# Patient Record
Sex: FEMALE | Race: WHITE | Employment: OTHER | URBAN - METROPOLITAN AREA
[De-identification: names, ages, dates, MRNs, and addresses within clinical notes are randomized per-mention and may not be internally consistent; named-entity substitution may affect disease eponyms.]

---

## 2019-03-12 PROBLEM — I35.8 AORTIC VALVE SCLEROSIS: Status: ACTIVE | Noted: 2019-03-12

## 2019-06-25 PROBLEM — I25.10 CAD (CORONARY ARTERY DISEASE): Status: ACTIVE | Noted: 2019-06-25

## 2019-06-25 PROBLEM — I10 HYPERTENSION: Status: ACTIVE | Noted: 2019-06-25

## 2020-01-23 ENCOUNTER — HOSPITAL ENCOUNTER (OUTPATIENT)
Dept: LAB | Age: 85
Discharge: HOME | End: 2020-01-23
Payer: MEDICARE

## 2020-01-23 DIAGNOSIS — I10 ESSENTIAL (PRIMARY) HYPERTENSION: ICD-10-CM

## 2020-01-23 DIAGNOSIS — D64.9 ANEMIA, UNSPECIFIED TYPE: ICD-10-CM

## 2020-01-23 DIAGNOSIS — E55.9 VITAMIN D DEFICIENCY, UNSPECIFIED: ICD-10-CM

## 2020-01-23 DIAGNOSIS — R68.89 OTHER GENERAL SYMPTOMS AND SIGNS: ICD-10-CM

## 2020-01-23 PROBLEM — D63.1 ANEMIA DUE TO CHRONIC KIDNEY DISEASE TREATED WITH ERYTHROPOIETIN: Status: ACTIVE | Noted: 2020-01-23

## 2020-01-23 PROBLEM — N18.9 ANEMIA DUE TO CHRONIC KIDNEY DISEASE TREATED WITH ERYTHROPOIETIN: Status: ACTIVE | Noted: 2020-01-23

## 2020-01-23 LAB
ALBUMIN SERPL-MCNC: 3.7 G/DL (ref 3.2–4.6)
ALBUMIN/GLOB SERPL: 0.9 {RATIO} (ref 1.2–3.5)
ALP SERPL-CCNC: 84 U/L (ref 50–136)
ALT SERPL-CCNC: 17 U/L (ref 12–65)
ANION GAP SERPL CALC-SCNC: 8 MMOL/L (ref 7–16)
AST SERPL-CCNC: 16 U/L (ref 15–37)
BASOPHILS # BLD: 0 K/UL (ref 0–0.2)
BASOPHILS NFR BLD: 0 % (ref 0–2)
BILIRUB SERPL-MCNC: 0.3 MG/DL (ref 0.2–1.1)
BUN SERPL-MCNC: 33 MG/DL (ref 8–23)
CALCIUM SERPL-MCNC: 9.1 MG/DL (ref 8.3–10.4)
CHLORIDE SERPL-SCNC: 106 MMOL/L (ref 98–107)
CO2 SERPL-SCNC: 23 MMOL/L (ref 21–32)
CREAT SERPL-MCNC: 1.33 MG/DL (ref 0.6–1)
CRP SERPL HS-MCNC: 1.9 MG/L
DAT POLY-SP REAG RBC QL: NORMAL
DIFFERENTIAL METHOD BLD: ABNORMAL
EOSINOPHIL # BLD: 0.2 K/UL (ref 0–0.8)
EOSINOPHIL NFR BLD: 3 % (ref 0.5–7.8)
ERYTHROCYTE [DISTWIDTH] IN BLOOD BY AUTOMATED COUNT: 12.9 % (ref 11.9–14.6)
ERYTHROCYTE [SEDIMENTATION RATE] IN BLOOD: 46 MM/HR (ref 0–30)
FERRITIN SERPL-MCNC: 241 NG/ML (ref 8–388)
GLOBULIN SER CALC-MCNC: 4 G/DL (ref 2.3–3.5)
GLUCOSE SERPL-MCNC: 144 MG/DL (ref 65–100)
HCT VFR BLD AUTO: 32.9 % (ref 35.8–46.3)
HGB BLD-MCNC: 10.5 G/DL (ref 11.7–15.4)
HGB RETIC QN AUTO: 35 PG (ref 29–35)
IMM GRANULOCYTES # BLD AUTO: 0 K/UL (ref 0–0.5)
IMM GRANULOCYTES NFR BLD AUTO: 0 % (ref 0–5)
IMM RETICS NFR: 6.4 % (ref 3–15.9)
LYMPHOCYTES # BLD: 3 K/UL (ref 0.5–4.6)
LYMPHOCYTES NFR BLD: 38 % (ref 13–44)
MCH RBC QN AUTO: 31.3 PG (ref 26.1–32.9)
MCHC RBC AUTO-ENTMCNC: 31.9 G/DL (ref 31.4–35)
MCV RBC AUTO: 97.9 FL (ref 79.6–97.8)
MONOCYTES # BLD: 0.7 K/UL (ref 0.1–1.3)
MONOCYTES NFR BLD: 8 % (ref 4–12)
NEUTS SEG # BLD: 4 K/UL (ref 1.7–8.2)
NEUTS SEG NFR BLD: 51 % (ref 43–78)
NRBC # BLD: 0 K/UL (ref 0–0.2)
PLATELET # BLD AUTO: 181 K/UL (ref 150–450)
PMV BLD AUTO: 10 FL (ref 9.4–12.3)
POTASSIUM SERPL-SCNC: 4.5 MMOL/L (ref 3.5–5.1)
PROT SERPL-MCNC: 7.7 G/DL (ref 6.3–8.2)
RBC # BLD AUTO: 3.36 M/UL (ref 4.05–5.25)
RETICS # AUTO: 0.04 M/UL (ref 0.03–0.1)
RETICS/RBC NFR AUTO: 1.1 % (ref 0.3–2)
SODIUM SERPL-SCNC: 137 MMOL/L (ref 136–145)
VIT B12 SERPL-MCNC: 669 PG/ML (ref 193–986)
WBC # BLD AUTO: 7.9 K/UL (ref 4.3–11.1)

## 2020-01-23 PROCEDURE — 84165 PROTEIN E-PHORESIS SERUM: CPT

## 2020-01-23 PROCEDURE — 80053 COMPREHEN METABOLIC PANEL: CPT

## 2020-01-23 PROCEDURE — 86141 C-REACTIVE PROTEIN HS: CPT

## 2020-01-23 PROCEDURE — 86334 IMMUNOFIX E-PHORESIS SERUM: CPT

## 2020-01-23 PROCEDURE — 82607 VITAMIN B-12: CPT

## 2020-01-23 PROCEDURE — 82668 ASSAY OF ERYTHROPOIETIN: CPT

## 2020-01-23 PROCEDURE — 85652 RBC SED RATE AUTOMATED: CPT

## 2020-01-23 PROCEDURE — 36415 COLL VENOUS BLD VENIPUNCTURE: CPT

## 2020-01-23 PROCEDURE — 85025 COMPLETE CBC W/AUTO DIFF WBC: CPT

## 2020-01-23 PROCEDURE — 86880 COOMBS TEST DIRECT: CPT

## 2020-01-23 PROCEDURE — 82306 VITAMIN D 25 HYDROXY: CPT

## 2020-01-23 PROCEDURE — 82728 ASSAY OF FERRITIN: CPT

## 2020-01-23 PROCEDURE — 85046 RETICYTE/HGB CONCENTRATE: CPT

## 2020-01-24 LAB
25(OH)D3+25(OH)D2 SERPL-MCNC: 61.6 NG/ML (ref 30–100)
ALBUMIN SERPL ELPH-MCNC: 4.11 G/DL (ref 3.2–5.6)
ALBUMIN/GLOB SERPL: 1.2 {RATIO}
ALPHA1 GLOB SERPL ELPH-MCNC: 0.3 G/DL (ref 0.1–0.4)
ALPHA2 GLOB SERPL ELPH-MCNC: 1.11 G/DL (ref 0.4–1.2)
B-GLOBULIN SERPL QL ELPH: 1.06 G/DL (ref 0.6–1.3)
EPO SERPL-ACNC: 14.1 MIU/ML (ref 2.6–18.5)
GAMMA GLOB MFR SERPL ELPH: 1.12 G/DL (ref 0.5–1.6)
IGA SERPL-MCNC: 240 MG/DL (ref 85–499)
IGG SERPL-MCNC: 981 MG/DL (ref 610–1616)
IGM SERPL-MCNC: 157 MG/DL (ref 35–242)
M PROTEIN SERPL ELPH-MCNC: NORMAL G/DL
PROT PATTERN SERPL ELPH-IMP: NORMAL
PROT PATTERN SPEC IFE-IMP: NORMAL
PROT SERPL-MCNC: 7.7 G/DL (ref 6.3–8.2)

## 2020-02-12 ENCOUNTER — HOSPITAL ENCOUNTER (OUTPATIENT)
Dept: LAB | Age: 85
Discharge: HOME | End: 2020-02-12
Payer: MEDICARE

## 2020-02-12 ENCOUNTER — HOSPITAL ENCOUNTER (OUTPATIENT)
Dept: INFUSION THERAPY | Age: 85
Discharge: HOME | End: 2020-02-12

## 2020-02-12 DIAGNOSIS — D64.9 ANEMIA, UNSPECIFIED TYPE: ICD-10-CM

## 2020-02-12 LAB
ALBUMIN SERPL-MCNC: 3.7 G/DL (ref 3.2–4.6)
ALBUMIN/GLOB SERPL: 0.9 {RATIO} (ref 1.2–3.5)
ALP SERPL-CCNC: 70 U/L (ref 50–136)
ALT SERPL-CCNC: 14 U/L (ref 12–65)
ANION GAP SERPL CALC-SCNC: 5 MMOL/L (ref 7–16)
AST SERPL-CCNC: 15 U/L (ref 15–37)
BASOPHILS # BLD: 0 K/UL (ref 0–0.2)
BASOPHILS NFR BLD: 0 % (ref 0–2)
BILIRUB SERPL-MCNC: 0.3 MG/DL (ref 0.2–1.1)
BUN SERPL-MCNC: 27 MG/DL (ref 8–23)
CALCIUM SERPL-MCNC: 9.2 MG/DL (ref 8.3–10.4)
CHLORIDE SERPL-SCNC: 100 MMOL/L (ref 98–107)
CO2 SERPL-SCNC: 27 MMOL/L (ref 21–32)
CREAT SERPL-MCNC: 1.24 MG/DL (ref 0.6–1)
DIFFERENTIAL METHOD BLD: ABNORMAL
EOSINOPHIL # BLD: 0.1 K/UL (ref 0–0.8)
EOSINOPHIL NFR BLD: 1 % (ref 0.5–7.8)
ERYTHROCYTE [DISTWIDTH] IN BLOOD BY AUTOMATED COUNT: 12.5 % (ref 11.9–14.6)
GLOBULIN SER CALC-MCNC: 3.9 G/DL (ref 2.3–3.5)
GLUCOSE SERPL-MCNC: 111 MG/DL (ref 65–100)
HCT VFR BLD AUTO: 32.8 % (ref 35.8–46.3)
HGB BLD-MCNC: 10.8 G/DL (ref 11.7–15.4)
IMM GRANULOCYTES # BLD AUTO: 0 K/UL (ref 0–0.5)
IMM GRANULOCYTES NFR BLD AUTO: 0 % (ref 0–5)
LYMPHOCYTES # BLD: 2.5 K/UL (ref 0.5–4.6)
LYMPHOCYTES NFR BLD: 33 % (ref 13–44)
MCH RBC QN AUTO: 31.5 PG (ref 26.1–32.9)
MCHC RBC AUTO-ENTMCNC: 32.9 G/DL (ref 31.4–35)
MCV RBC AUTO: 95.6 FL (ref 79.6–97.8)
MONOCYTES # BLD: 0.7 K/UL (ref 0.1–1.3)
MONOCYTES NFR BLD: 9 % (ref 4–12)
NEUTS SEG # BLD: 4.3 K/UL (ref 1.7–8.2)
NEUTS SEG NFR BLD: 56 % (ref 43–78)
NRBC # BLD: 0 K/UL (ref 0–0.2)
PLATELET # BLD AUTO: 168 K/UL (ref 150–450)
PMV BLD AUTO: 9.7 FL (ref 9.4–12.3)
POTASSIUM SERPL-SCNC: 5 MMOL/L (ref 3.5–5.1)
PROT SERPL-MCNC: 7.6 G/DL (ref 6.3–8.2)
RBC # BLD AUTO: 3.43 M/UL (ref 4.05–5.25)
SODIUM SERPL-SCNC: 132 MMOL/L (ref 136–145)
WBC # BLD AUTO: 7.7 K/UL (ref 4.3–11.1)

## 2020-02-12 PROCEDURE — 80053 COMPREHEN METABOLIC PANEL: CPT

## 2020-02-12 PROCEDURE — 36415 COLL VENOUS BLD VENIPUNCTURE: CPT

## 2020-02-12 PROCEDURE — 85025 COMPLETE CBC W/AUTO DIFF WBC: CPT

## 2020-08-12 ENCOUNTER — HOSPITAL ENCOUNTER (OUTPATIENT)
Dept: INFUSION THERAPY | Age: 85
Discharge: HOME | End: 2020-08-12

## 2020-08-12 ENCOUNTER — HOSPITAL ENCOUNTER (OUTPATIENT)
Dept: LAB | Age: 85
Discharge: HOME | End: 2020-08-12
Payer: MEDICARE

## 2020-08-12 DIAGNOSIS — D63.1 ANEMIA DUE TO CHRONIC KIDNEY DISEASE TREATED WITH ERYTHROPOIETIN: ICD-10-CM

## 2020-08-12 DIAGNOSIS — N18.9 ANEMIA DUE TO CHRONIC KIDNEY DISEASE TREATED WITH ERYTHROPOIETIN: ICD-10-CM

## 2020-08-12 LAB
ALBUMIN SERPL-MCNC: 4.1 G/DL (ref 3.2–4.6)
ALBUMIN/GLOB SERPL: 1.1 {RATIO} (ref 1.2–3.5)
ALP SERPL-CCNC: 88 U/L (ref 50–136)
ALT SERPL-CCNC: 20 U/L (ref 12–65)
ANION GAP SERPL CALC-SCNC: 7 MMOL/L (ref 7–16)
AST SERPL-CCNC: 21 U/L (ref 15–37)
BASOPHILS # BLD: 0.1 K/UL (ref 0–0.2)
BASOPHILS NFR BLD: 1 % (ref 0–2)
BILIRUB SERPL-MCNC: 0.3 MG/DL (ref 0.2–1.1)
BUN SERPL-MCNC: 21 MG/DL (ref 8–23)
CALCIUM SERPL-MCNC: 9.1 MG/DL (ref 8.3–10.4)
CHLORIDE SERPL-SCNC: 105 MMOL/L (ref 98–107)
CO2 SERPL-SCNC: 26 MMOL/L (ref 21–32)
CREAT SERPL-MCNC: 1.2 MG/DL (ref 0.6–1)
DIFFERENTIAL METHOD BLD: ABNORMAL
EOSINOPHIL # BLD: 0.1 K/UL (ref 0–0.8)
EOSINOPHIL NFR BLD: 1 % (ref 0.5–7.8)
ERYTHROCYTE [DISTWIDTH] IN BLOOD BY AUTOMATED COUNT: 12.9 % (ref 11.9–14.6)
GLOBULIN SER CALC-MCNC: 3.8 G/DL (ref 2.3–3.5)
GLUCOSE SERPL-MCNC: 100 MG/DL (ref 65–100)
HCT VFR BLD AUTO: 35.2 % (ref 35.8–46.3)
HGB BLD-MCNC: 11.6 G/DL (ref 11.7–15.4)
IMM GRANULOCYTES # BLD AUTO: 0 K/UL (ref 0–0.5)
IMM GRANULOCYTES NFR BLD AUTO: 0 % (ref 0–5)
LYMPHOCYTES # BLD: 3.5 K/UL (ref 0.5–4.6)
LYMPHOCYTES NFR BLD: 36 % (ref 13–44)
MCH RBC QN AUTO: 30.7 PG (ref 26.1–32.9)
MCHC RBC AUTO-ENTMCNC: 33 G/DL (ref 31.4–35)
MCV RBC AUTO: 93.1 FL (ref 79.6–97.8)
MONOCYTES # BLD: 0.8 K/UL (ref 0.1–1.3)
MONOCYTES NFR BLD: 9 % (ref 4–12)
NEUTS SEG # BLD: 5.1 K/UL (ref 1.7–8.2)
NEUTS SEG NFR BLD: 53 % (ref 43–78)
NRBC # BLD: 0 K/UL (ref 0–0.2)
PLATELET # BLD AUTO: 155 K/UL (ref 150–450)
PMV BLD AUTO: 9.6 FL (ref 9.4–12.3)
POTASSIUM SERPL-SCNC: 4.3 MMOL/L (ref 3.5–5.1)
PROT SERPL-MCNC: 7.9 G/DL (ref 6.3–8.2)
RBC # BLD AUTO: 3.78 M/UL (ref 4.05–5.25)
SODIUM SERPL-SCNC: 138 MMOL/L (ref 136–145)
WBC # BLD AUTO: 9.6 K/UL (ref 4.3–11.1)

## 2020-08-12 PROCEDURE — 85025 COMPLETE CBC W/AUTO DIFF WBC: CPT

## 2020-08-12 PROCEDURE — 36415 COLL VENOUS BLD VENIPUNCTURE: CPT

## 2020-08-12 PROCEDURE — 80053 COMPREHEN METABOLIC PANEL: CPT

## 2021-03-10 ENCOUNTER — HOSPITAL ENCOUNTER (OUTPATIENT)
Dept: INFUSION THERAPY | Age: 86
Discharge: HOME | End: 2021-03-10

## 2021-03-10 ENCOUNTER — HOSPITAL ENCOUNTER (OUTPATIENT)
Dept: LAB | Age: 86
Discharge: HOME | End: 2021-03-10
Payer: MEDICARE

## 2021-03-10 DIAGNOSIS — D63.1 ANEMIA DUE TO CHRONIC KIDNEY DISEASE TREATED WITH ERYTHROPOIETIN: ICD-10-CM

## 2021-03-10 DIAGNOSIS — N18.9 ANEMIA DUE TO CHRONIC KIDNEY DISEASE TREATED WITH ERYTHROPOIETIN: ICD-10-CM

## 2021-03-10 LAB
ALBUMIN SERPL-MCNC: 3.8 G/DL (ref 3.2–4.6)
ALBUMIN/GLOB SERPL: 1 {RATIO} (ref 1.2–3.5)
ALP SERPL-CCNC: 114 U/L (ref 50–136)
ALT SERPL-CCNC: 21 U/L (ref 12–65)
ANION GAP SERPL CALC-SCNC: 2 MMOL/L (ref 7–16)
AST SERPL-CCNC: 19 U/L (ref 15–37)
BASOPHILS # BLD: 0 K/UL (ref 0–0.2)
BASOPHILS NFR BLD: 0 % (ref 0–2)
BILIRUB SERPL-MCNC: 0.3 MG/DL (ref 0.2–1.1)
BUN SERPL-MCNC: 23 MG/DL (ref 8–23)
CALCIUM SERPL-MCNC: 9.1 MG/DL (ref 8.3–10.4)
CHLORIDE SERPL-SCNC: 102 MMOL/L (ref 98–107)
CO2 SERPL-SCNC: 32 MMOL/L (ref 21–32)
CREAT SERPL-MCNC: 1.1 MG/DL (ref 0.6–1)
DIFFERENTIAL METHOD BLD: ABNORMAL
EOSINOPHIL # BLD: 0.1 K/UL (ref 0–0.8)
EOSINOPHIL NFR BLD: 2 % (ref 0.5–7.8)
ERYTHROCYTE [DISTWIDTH] IN BLOOD BY AUTOMATED COUNT: 13.5 % (ref 11.9–14.6)
GLOBULIN SER CALC-MCNC: 4 G/DL (ref 2.3–3.5)
GLUCOSE SERPL-MCNC: 91 MG/DL (ref 65–100)
HCT VFR BLD AUTO: 33.9 % (ref 35.8–46.3)
HGB BLD-MCNC: 10.7 G/DL (ref 11.7–15.4)
IMM GRANULOCYTES # BLD AUTO: 0 K/UL (ref 0–0.5)
IMM GRANULOCYTES NFR BLD AUTO: 0 % (ref 0–5)
LYMPHOCYTES # BLD: 2.4 K/UL (ref 0.5–4.6)
LYMPHOCYTES NFR BLD: 33 % (ref 13–44)
MCH RBC QN AUTO: 30.3 PG (ref 26.1–32.9)
MCHC RBC AUTO-ENTMCNC: 31.6 G/DL (ref 31.4–35)
MCV RBC AUTO: 96 FL (ref 79.6–97.8)
MONOCYTES # BLD: 0.9 K/UL (ref 0.1–1.3)
MONOCYTES NFR BLD: 12 % (ref 4–12)
NEUTS SEG # BLD: 3.7 K/UL (ref 1.7–8.2)
NEUTS SEG NFR BLD: 53 % (ref 43–78)
NRBC # BLD: 0 K/UL (ref 0–0.2)
PLATELET # BLD AUTO: 152 K/UL (ref 150–450)
PMV BLD AUTO: 9.5 FL (ref 9.4–12.3)
POTASSIUM SERPL-SCNC: 4.5 MMOL/L (ref 3.5–5.1)
PROT SERPL-MCNC: 7.8 G/DL (ref 6.3–8.2)
RBC # BLD AUTO: 3.53 M/UL (ref 4.05–5.25)
SODIUM SERPL-SCNC: 136 MMOL/L (ref 136–145)
WBC # BLD AUTO: 7.1 K/UL (ref 4.3–11.1)

## 2021-03-10 PROCEDURE — 85025 COMPLETE CBC W/AUTO DIFF WBC: CPT

## 2021-03-10 PROCEDURE — 36415 COLL VENOUS BLD VENIPUNCTURE: CPT

## 2021-03-10 PROCEDURE — 80053 COMPREHEN METABOLIC PANEL: CPT

## 2022-03-18 PROBLEM — I35.8 AORTIC VALVE SCLEROSIS: Status: ACTIVE | Noted: 2019-03-12

## 2022-03-19 PROBLEM — N18.9 ANEMIA DUE TO CHRONIC KIDNEY DISEASE TREATED WITH ERYTHROPOIETIN: Status: ACTIVE | Noted: 2020-01-23

## 2022-03-19 PROBLEM — I10 HYPERTENSION: Status: ACTIVE | Noted: 2019-06-25

## 2022-03-19 PROBLEM — D63.1 ANEMIA DUE TO CHRONIC KIDNEY DISEASE TREATED WITH ERYTHROPOIETIN: Status: ACTIVE | Noted: 2020-01-23

## 2022-03-19 PROBLEM — I25.10 CAD (CORONARY ARTERY DISEASE): Status: ACTIVE | Noted: 2019-06-25

## 2022-04-14 ENCOUNTER — HOSPITAL ENCOUNTER (OUTPATIENT)
Dept: PHYSICAL THERAPY | Age: 87
Discharge: HOME | End: 2022-04-14
Payer: MEDICARE

## 2022-04-14 NOTE — PROGRESS NOTES
Wily Walker  : 1933  [place]  Secondary: Orlando Jolley 75 at [place] [id number] [facility], [address] [place] [address],[address], [facility] U. 91.  [phone]   [phone]          OUTPATIENT DAILY NOTE    NAME/AGE/GENDER: Wily Walker is a 80 y.o. female. DATE: 2022    Patient cancelled (less than 24 hours ago) her initial evaluation appointment today due to illness. Will plan to follow up on next scheduled visit.     Lito Jeffrey, PT    Future Appointments   Date Time Provider Maki Topete   2022  9:30 AM Cheryl Hurt PT [facility]   4/15/2022 12:30 PM [address] [place] [address] OUTREACH INSURANCE [address]   4/15/2022  1:00 PM Krystin Eugene MD [facility]   2022  1:30 PM Fahad Webber MD [address] [place] [address]

## 2022-04-15 ENCOUNTER — HOSPITAL ENCOUNTER (OUTPATIENT)
Dept: LAB | Age: 87
Discharge: HOME | End: 2022-04-15
Payer: MEDICARE

## 2022-04-15 DIAGNOSIS — N18.9 ANEMIA DUE TO CHRONIC KIDNEY DISEASE, UNSPECIFIED CKD STAGE: ICD-10-CM

## 2022-04-15 DIAGNOSIS — D63.1 ANEMIA DUE TO CHRONIC KIDNEY DISEASE, UNSPECIFIED CKD STAGE: ICD-10-CM

## 2022-04-15 LAB
ALBUMIN SERPL-MCNC: 3.8 G/DL (ref 3.2–4.6)
ALBUMIN/GLOB SERPL: 1 {RATIO} (ref 1.2–3.5)
ALP SERPL-CCNC: 81 U/L (ref 50–136)
ALT SERPL-CCNC: 17 U/L (ref 12–65)
ANION GAP SERPL CALC-SCNC: 4 MMOL/L (ref 7–16)
AST SERPL-CCNC: 18 U/L (ref 15–37)
BASOPHILS # BLD: 0 K/UL (ref 0–0.2)
BASOPHILS NFR BLD: 1 % (ref 0–2)
BILIRUB SERPL-MCNC: 0.3 MG/DL (ref 0.2–1.1)
BUN SERPL-MCNC: 22 MG/DL (ref 8–23)
CALCIUM SERPL-MCNC: 9.1 MG/DL (ref 8.3–10.4)
CHLORIDE SERPL-SCNC: 104 MMOL/L (ref 98–107)
CO2 SERPL-SCNC: 30 MMOL/L (ref 21–32)
CREAT SERPL-MCNC: 1.1 MG/DL (ref 0.6–1)
DIFFERENTIAL METHOD BLD: ABNORMAL
EOSINOPHIL # BLD: 0.1 K/UL (ref 0–0.8)
EOSINOPHIL NFR BLD: 2 % (ref 0.5–7.8)
ERYTHROCYTE [DISTWIDTH] IN BLOOD BY AUTOMATED COUNT: 13.1 % (ref 11.9–14.6)
GLOBULIN SER CALC-MCNC: 3.7 G/DL (ref 2.3–3.5)
GLUCOSE SERPL-MCNC: 120 MG/DL (ref 65–100)
HCT VFR BLD AUTO: 35.9 % (ref 35.8–46.3)
HGB BLD-MCNC: 11.3 G/DL (ref 11.7–15.4)
IMM GRANULOCYTES # BLD AUTO: 0 K/UL (ref 0–0.5)
IMM GRANULOCYTES NFR BLD AUTO: 0 % (ref 0–5)
LYMPHOCYTES # BLD: 2.8 K/UL (ref 0.5–4.6)
LYMPHOCYTES NFR BLD: 34 % (ref 13–44)
MCH RBC QN AUTO: 29.8 PG (ref 26.1–32.9)
MCHC RBC AUTO-ENTMCNC: 31.5 G/DL (ref 31.4–35)
MCV RBC AUTO: 94.7 FL (ref 79.6–97.8)
MONOCYTES # BLD: 0.8 K/UL (ref 0.1–1.3)
MONOCYTES NFR BLD: 10 % (ref 4–12)
NEUTS SEG # BLD: 4.4 K/UL (ref 1.7–8.2)
NEUTS SEG NFR BLD: 54 % (ref 43–78)
NRBC # BLD: 0 K/UL (ref 0–0.2)
PLATELET # BLD AUTO: 160 K/UL (ref 150–450)
PMV BLD AUTO: 9.7 FL (ref 9.4–12.3)
POTASSIUM SERPL-SCNC: 3.8 MMOL/L (ref 3.5–5.1)
PROT SERPL-MCNC: 7.5 G/DL (ref 6.3–8.2)
RBC # BLD AUTO: 3.79 M/UL (ref 4.05–5.2)
SODIUM SERPL-SCNC: 138 MMOL/L (ref 136–145)
WBC # BLD AUTO: 8.1 K/UL (ref 4.3–11.1)

## 2022-04-15 PROCEDURE — 36415 COLL VENOUS BLD VENIPUNCTURE: CPT

## 2022-04-15 PROCEDURE — 85025 COMPLETE CBC W/AUTO DIFF WBC: CPT

## 2022-04-15 PROCEDURE — 80053 COMPREHEN METABOLIC PANEL: CPT

## 2022-04-25 ENCOUNTER — HOSPITAL ENCOUNTER (OUTPATIENT)
Dept: PHYSICAL THERAPY | Age: 87
Discharge: HOME | End: 2022-04-25
Payer: MEDICARE

## 2022-04-25 PROCEDURE — 97110 THERAPEUTIC EXERCISES: CPT

## 2022-04-25 PROCEDURE — 97162 PT EVAL MOD COMPLEX 30 MIN: CPT

## 2022-04-25 NOTE — PROGRESS NOTES
Ellie Martines  : 1933  [place]  Secondary: Orlando Lazo at [facility]  [facility] [facility], [address] [place] [address],[address], [facility] U. 91.  [phone]   [phone]     OUTPATIENT PHYSICAL THERAPY: Daily Treatment Note 2022  Visit Count:  1    ICD-10: Treatment Diagnosis: Difficulty in walking, not elsewhere classified (R26.2)  Precautions/Allergies:   Pcn [penicillins]   TREATMENT PLAN:  Effective Dates: 2022 TO 2022 (90 days). Frequency/Duration: 2 times a week for 90 Day(s)    Pre-treatment Symptoms/Complaints:  Imbalance, weakness, and difficulty walking  Pain: Initial: Pain Intensity 1: 0  Post Session:  0/10   Medications Last Reviewed:  2022  Updated Objective Findings:  See evaluation note from today  TREATMENT:     THERAPEUTIC EXERCISE: (25 minutes):  Exercises per grid below to improve mobility and strength. Required minimal verbal cues to promote proper body alignment. Progressed repetitions as indicated. Date:  2022 Date:   Date:     Activity/Exercise Parameters Parameters Parameters   Nustep  Level 2 x 10 minutes     Standing hip flexion 10 reps bilateral     Standing hip abduction 10 reps bilateral     Standing hamstring curls 10 reps bilateral     Seated LAQS 10 reps bilateral                     [employer] Portal  Treatment/Session Summary:    · Response to Treatment:  tolerated well. · Communication/Consultation:  None today  · Equipment provided today:  None today  · Recommendations/Intent for next treatment session: Next visit will focus on balance, strengthening, and gait.     Total Treatment Billable Duration:  25 minutes+ evaluation  PT Patient Time In/Time Out  Time In: 1515  Time Out: [address], PT    Future Appointments   Date Time Provider Maki Topete   2022  1:30 PM [facility]   2023 12:30 PM [facility] OUTREACH INSURANCE [facility]IG [facility]   4/14/2023  1:00 PM Jameson Dsouza MD [facility]     Visit # Therapist initials Date Arrived NS/ Cx < 24 hr >24 hr Cx Visit Comments   1 PV 4/25/2022 X   Initial Assessment and Treatment                                                                                                                                                              Abbreviations: NS = No Show; CX = cancelled

## 2022-04-25 NOTE — THERAPY EVALUATION
Janett Montoya  : 1933  [place]  [facility]: [facility], [address] [place] [address],[address], [address] [facility]  [phone]   [phone]        OUTPATIENT PHYSICAL THERAPY:Initial Assessment 2022   ICD-10: Treatment Diagnosis: Difficulty in walking, not elsewhere classified (R26.2)  Precautions/Allergies:   Pcn [penicillins]   TREATMENT PLAN:  Effective Dates: 2022 TO 2022 (90 days). Frequency/Duration: 2 times a week for 90 Day(s) MEDICAL/REFERRING DIAGNOSIS:  Weakness [R53.1]   DATE OF ONSET: Over the past year   REFERRING PHYSICIAN: Shaheen Grace MD Orders: Evaluate and treat   Return MD Appointment: unknown      INITIAL ASSESSMENT:  Ms. Lorne Sterling presents with increased weakness, imbalance, and difficulty walking. Patient is at higher fall risk based on history of falls and score received on Casey Balance Scale. Patient would benefit from skilled physical therapy to address problems and goals. Thank you for this referral.      PROBLEM LIST (Impacting functional limitations):  1. Decreased Strength  2. Decreased Ambulation Ability/Technique  3. Decreased Balance  4. Decreased [place] with Home Exercise Program INTERVENTIONS PLANNED: (Treatment may consist of any combination of the following)  1. Balance Exercise  2. Gait Training  3. Home Exercise Program (HEP)  4. Therapeutic Exercise/Strengthening     GOALS: (Goals have been discussed and agreed upon with patient.)  Short-Term Functional Goals: Time Frame: 30 days   1. Patient will be independent with home exercise to improve balance and strength. 2. Patient will score less than or equal to 15 seconds on Timed Up and Go Test indicating improved gait speed. Discharge Goals: Time Frame: 90 days   1. Patient will score less than or equal to 12 seconds on Timed Up and Go Test indicating improved gait speed.    2. Patient will score greater than or equal to 46/56 on Casey Balance Scale indicating improved balance and decreased risk of falls with daily activities. 3. Patient will report improved ease getting up into her daughter's truck. OUTCOME MEASURE:   Tool Used: Casey Balance Scale  Score:  Initial: 37/56 Most Recent: X/56 (Date: -- )   Interpretation of Score: Each section is scored on a 0-4 scale, 0 representing the patients inability to perform the task and 4 representing independence. The scores of each section are added together for a total score of 56. The higher the patients score, the more independent the patient is. Any score below 45 indicates increased risk for falls. Tool Used: Timed Up and Go (TUG)  Score:  Initial: 18.5 seconds Most Recent: X seconds (Date: -- )   Interpretation of Score: The test measures, in seconds, the time taken by an individual to stand up from a standard arm chair (seat height 46 cm [18 in], arm height 65 cm [25.6 in]), walk a distance of 3 meters (118 in, approx 10 ft), turn, walk back to the chair and sit down. If the individual takes longer than 14 seconds to complete TUG, this indicates risk for falls. MEDICAL NECESSITY:   · Patient is expected to demonstrate progress in strength and balance to improve safety during activites of daily living. REASON FOR SERVICES/OTHER COMMENTS:  · Patient continues to require skilled intervention due to higher fall risk with daily activities. Total Duration:  PT Patient Time In/Time Out  Time In: 1515  Time Out: 1600    Rehabilitation Potential For Stated Goals: Excellent  Regarding Blaze Sanz Theodore's therapy, I certify that the treatment plan above will be carried out by a therapist or under their direction.   Thank you for this referral,  Rylee Li, PT     Referring Physician Signature: Tara Cody PA-C _______________________________ Date _____________      PAIN/SUBJECTIVE:   Initial: Pain Intensity 1: 0  Post Session: 0/10   HISTORY:   History of Injury/Illness (Reason for Referral):  Patient reports getting progressively weaker over the past year. Patient has had one fall over the past couple of months. Patient reports tripping and falling over a rug. Patient ambulates with cane. Patient rates current pain level as 0/10 and current dizziness as 0/10. Patient has trouble lifting her leg up to get into her daughter's truck. Patient would like to get her legs stronger. Past Medical History/Comorbidities:   Ms. Raquel Dickens  has a past medical history of Breast cancer ([address] CAD (coronary artery disease), GERD (gastroesophageal reflux disease), Hiatal hernia, High triglycerides, History of heart artery stent, History of kidney problems, and Hypertension. Ms. Raquel Dickens  has a past surgical history that includes hx coronary stent placement (2005); hx hysterectomy; hx vein stripping; hx mastectomy (Left, 1990); hx coronary artery bypass graft; hx rotator cuff repair (Right); and hx hysterectomy. Social History/Living Environment:     Lives with daughter in one story home. Patient has five steps with a railing to enter. Prior Level of Function/Work/Activity:  Independent. Retired. Dominant Side:         RIGHT  Personal Factors:          Sex:  female        Age:  80 y.o. Ambulatory/Rehab Services H2 Model Falls Risk Assessment   Risk Factors:       (2)  Any administered antiepileptics/anticonvulsants       (5)  History of Recent Falls [w/in 3 months] Ability to Rise from Chair:       (1)  Pushes up, successful in one attempt   Falls Prevention Plan:       Exercise/Equipment Adaptation (specify):  Fortunato will be supervised in the clinic at all times due to higher fall risk   Total: (5 or greater = High Risk): 8   ©2010 [facility] . [facility] Patent #[id number].  Federal Law prohibits the replication, distribution or use without written permission from [facility] [employer]   Current Medications:       Current Outpatient Medications:     amLODIPine (NORVASC) 10 mg tablet, Take  by mouth daily. , Disp: , Rfl:     CALCIUM PO, Take  by mouth., Disp: , Rfl:     meclizine (ANTIVERT) 25 mg tablet, Take  by mouth three (3) times daily as needed for Dizziness. , Disp: , Rfl:     OTHER, Prevagen, Disp: , Rfl:     nitroglycerin (Nitrostat) 0.4 mg SL tablet, 1 Tablet by SubLINGual route every five (5) minutes as needed for Chest Pain. Up to 3 doses, THEN PROCEED TO ER., Disp: 25 Tablet, Rfl: 5    isosorbide mononitrate ER (IMDUR) 60 mg CR tablet, Take 1 Tablet by mouth every morning., Disp: 90 Tablet, Rfl: 3    esomeprazole (NEXIUM) 40 mg capsule, TAKE 1 CAPSULE BY MOUTH DAILY, Disp: 90 Cap, Rfl: 3    simvastatin (ZOCOR) 40 mg tablet, Take 1 Tab by mouth nightly. (Patient taking differently: Take 20 mg by mouth nightly.), Disp: 90 Tab, Rfl: 3    metoprolol succinate (TOPROL-XL) 25 mg XL tablet, Take 1 Tab by mouth daily. , Disp: 90 Tab, Rfl: 3    multivitamin (ONE A DAY) tablet, Take 1 Tab by mouth daily. , Disp: , Rfl:     fish oil-omega-3 fatty acids 340-1,000 mg capsule, Take 1 Cap by mouth daily. , Disp: , Rfl:     clonazePAM (KLONOPIN) 1 mg tablet, Take 0.5 mg by mouth. 1 tablet qhs, Disp: , Rfl:     cholecalciferol (VITAMIN D3) 1,000 unit cap, Take  by mouth daily. , Disp: , Rfl:     b complex vitamins tablet, Take 1 Tab by mouth daily. , Disp: , Rfl:     aspirin delayed-release 81 mg tablet, Take  by mouth daily. , Disp: , Rfl:     ascorbic acid, vitamin C, (VITAMIN C) 500 mg tablet, Take  by mouth., Disp: , Rfl:    Date Last Reviewed:  4/25/2022   Number of Personal Factors/Comorbidities that affect the Plan of Care: 1-2: MODERATE COMPLEXITY   EXAMINATION:   Observation/Orthostatic Postural Assessment: Forward head/rounded shoulders posture. Functional Mobility:         Gait/Ambulation:  Patient ambulates with a single point cane demonstrating decreased gait speed.     Strength: Hip flexion 4-/5, hip abduction 4/5, hip adduction 4/5, quadriceps 4+/5, hamstrings 4-/5, ankle dorsiflexion 4-/5, ankle plantarflexion 4/5. Sensation:         Within normal limits. Postural Control & Balance:  · Casey Balance Scale:  37/56.   (A score less than 45/56 indicates high risk of falls)        Body Structures Involved:  1. Nerves  2. Muscles Body Functions Affected:  1. Neuromusculoskeletal Activities and Participation Affected:  1. General Tasks and Demands  2. Mobility  3.  Community, Social and [place]   Number of elements (examined above) that affect the Plan of Care: 4+: HIGH COMPLEXITY   CLINICAL PRESENTATION:   Presentation: Evolving clinical presentation with changing clinical characteristics: MODERATE COMPLEXITY   CLINICAL DECISION MAKING:   Use of outcome tool(s) and clinical judgement create a POC that gives a: Questionable prediction of patient's progress: MODERATE COMPLEXITY

## 2022-05-05 ENCOUNTER — HOSPITAL ENCOUNTER (OUTPATIENT)
Dept: PHYSICAL THERAPY | Age: 87
Discharge: HOME | End: 2022-05-05
Payer: MEDICARE

## 2022-05-05 PROCEDURE — 97110 THERAPEUTIC EXERCISES: CPT

## 2022-05-05 NOTE — PROGRESS NOTES
Mahad Barlow  : 1933  [place]  Secondary: Orlando Jolley 75 at [place] [address] [facility], [address] [place] [address],[address], [facility] U. 91.  [phone]   [phone]     OUTPATIENT PHYSICAL THERAPY: Daily Treatment Note 2022  Visit Count:  2    ICD-10: Treatment Diagnosis: Difficulty in walking, not elsewhere classified (R26.2)  Precautions/Allergies:   Pcn [penicillins]   TREATMENT PLAN:  Effective Dates: 2022 TO 2022 (90 days). Frequency/Duration: 2 times a week for 90 Day(s)    Pre-treatment Symptoms/Complaints:  Imbalance, weakness, and difficulty walking. I had one fall since I saw you last.  \"I fell trying to get to the bathroom in the middle of the night. It is better, but I am still a little sore\". Pain: Initial: Pain Intensity 1: 4  Pain Location 1: Hip  Pain Orientation 1: Right  Post Session:  4/10   Medications Last Reviewed:  2022  Updated Objective Findings:  None Today  TREATMENT:     THERAPEUTIC EXERCISE: (45 minutes):  Exercises per grid below to improve mobility and strength. Required minimal verbal cues to promote proper body alignment. Progressed repetitions as indicated. Date:  2022 Date:  2022 Date:     Activity/Exercise Parameters Parameters Parameters   Nustep  Level 2 x 10 minutes Level 3 x 10 minutes    Standing hip flexion 10 reps bilateral 15 reps bilateral    Standing hip abduction 10 reps bilateral 15 reps bilateral    Standing hamstring curls 10 reps bilateral 15 reps bilateral    Seated LAQS 10 reps bilateral 15 reps bilateral    Step ups  6 inch  2x10 reps    Stepping over half foam  Forward 10 reps each  Lateral 10 reps each    Blue foam  Eyes open and eyes closed                    [employer] Portal  Treatment/Session Summary:    · Response to Treatment:  Patient tolerated additional exercises and additional repetitons without complaints.   · Communication/Consultation: None today  · Equipment provided today:  None today  · Recommendations/Intent for next treatment session: Next visit will focus on balance, strengthening, and gait.     Total Treatment Billable Duration:  45 minutes  PT Patient Time In/Time Out  Time In: 1300  Time Out: [facility], PT    Future Appointments   Date Time Provider Maki Topete   5/10/2022  1:00 PM Jesus Alberto Ulrich PT [facility] SFE   5/12/2022  1:00 PM Iggy Noel, [facility] SFE   5/17/2022  1:00 PM Iggy Noel, PT [facility] SFE   5/19/2022  1:45 PM Iggy Noel, PT S[facility]ORPT SFE   8/26/2022  1:30 PM Meek Shaw MD [facility]   4/14/2023 12:30 PM [address] [place] [address]   4/14/2023  1:00 PM Dimitrios Jay MD [facility]     Visit # Therapist initials Date Arrived NS/ Cx < 24 hr >24 hr Cx Visit Comments   1 PV 4/25/2022 X   Initial Assessment and Treatment   2 PV 5/5/2022 X                                                                                                                                                        Abbreviations: NS = No Show; CX = cancelled

## 2022-05-10 ENCOUNTER — HOSPITAL ENCOUNTER (OUTPATIENT)
Dept: PHYSICAL THERAPY | Age: 87
Discharge: HOME | End: 2022-05-10
Payer: MEDICARE

## 2022-05-10 PROCEDURE — 97110 THERAPEUTIC EXERCISES: CPT

## 2022-05-10 NOTE — PROGRESS NOTES
Yulissa Chamberlain  : 1933  [place]  Secondary: Orlando Jolley 75 at [address] [address] [facility], [address] [place] [address],[address], [facility]  [phone]   [phone]     OUTPATIENT PHYSICAL THERAPY: Daily Treatment Note 5/10/2022  Visit Count:  3    ICD-10: Treatment Diagnosis: Difficulty in walking, not elsewhere classified (R26.2)  Precautions/Allergies:   Pcn [penicillins]   TREATMENT PLAN:  Effective Dates: 2022 TO 2022 (90 days). Frequency/Duration: 2 times a week for 90 Day(s)    Pre-treatment Symptoms/Complaints:  Patient reports she was sore after last treatment, but she feels good today. Pain: Initial: Pain Intensity 1: 3  Pain Location 1: Hip  Pain Orientation 1: Right  Post Session:  3/10   Medications Last Reviewed:  5/10/2022  Updated Objective Findings:  None Today  TREATMENT:     THERAPEUTIC EXERCISE: (45 minutes):  Exercises per grid below to improve mobility and strength. Required minimal verbal cues to promote proper body alignment. Progressed repetitions as indicated.    Date:  2022 Date:  2022 Date:  5/10/2022   Activity/Exercise Parameters Parameters Parameters   Nustep  Level 2 x 10 minutes Level 3 x 10 minutes Level 3 x 10 minutes   Standing hip flexion 10 reps bilateral 15 reps bilateral 15 reps bilateral   Standing hip abduction 10 reps bilateral 15 reps bilateral 15 reps bilateral   Standing hamstring curls 10 reps bilateral 15 reps bilateral 15 reps bilateral   Seated LAQS 10 reps bilateral 15 reps bilateral 15 reps bilateral   Step ups 6 inch  2x10 reps 6 inch  2x10 reps 6 inch  2x10 reps   Stepping over half foam Forward 10 reps each  Lateral 10 reps each Forward 10 reps each  Lateral 10 reps each Forward 10 reps each  Lateral 10 reps each   Blue foam  Eyes open and eyes closed Eyes open and eyes closed   Standing hip extension   15 reps bilateral             MedBridge Portal  Treatment/Session Summary:    · Response to Treatment:  Patient tolerated treatment well. · Communication/Consultation:  None today  · Equipment provided today:  None today  · Recommendations/Intent for next treatment session: Next visit will focus on balance, strengthening, and gait.     Total Treatment Billable Duration:  45 minutes  PT Patient Time In/Time Out  Time In: 1072  Time Out: [address], PT    Future Appointments   Date Time Provider Maki Topete   5/10/2022  1:00 PM Jesus Alberto Ulrich PT [facility]   5/12/2022  1:00 PM Iggy Noel, PT [facility] SFE   5/17/2022  1:00 PM Iggy Noel, PT [facility] SFE   5/19/2022  1:45 PM Iggy Noel, PT [facility] SFE   4/14/2023 12:30 PM [address] [place] [address] [facility] [address]     Visit # Therapist initials Date Arrived NS/ Cx < 24 hr >24 hr Cx Visit Comments   1 PV 4/25/2022 X   Initial Assessment and Treatment   2 PV 5/5/2022 X      3 PV 5/10/2022 X                                                                                                                                               Abbreviations: NS = No Show; CX = cancelled

## 2022-05-12 ENCOUNTER — HOSPITAL ENCOUNTER (OUTPATIENT)
Dept: PHYSICAL THERAPY | Age: 87
Discharge: HOME | End: 2022-05-12
Payer: MEDICARE

## 2022-05-12 NOTE — PROGRESS NOTES
Pierre Dailey  : 1933  [place]  Secondary: Orlando Jolley 75 at [address], [address] [place] [address],[address], [facility] [facility]  [phone]   [phone]          OUTPATIENT DAILY NOTE    NAME/AGE/GENDER: Pierre Dailey is a 80 y.o. female. DATE: 2022    Patient cancelled (less than 24 hours ago) her appointment for today due to unknown reasons. Will plan to follow up on next scheduled visit.     Estephania Clemons, PT    Future Appointments   Date Time Provider Maki Topete   2022  1:00 PM Alonso Myrick, PT [facility] SFE   2022  1:45 PM Ricci Noel, PT [facility] SFE   2023 12:30 PM [facility] OUTREACH INSURANCE [facility] [address] [facility]

## 2022-05-17 ENCOUNTER — HOSPITAL ENCOUNTER (OUTPATIENT)
Dept: PHYSICAL THERAPY | Age: 87
Discharge: HOME | End: 2022-05-17
Payer: MEDICARE

## 2022-05-17 PROCEDURE — 97110 THERAPEUTIC EXERCISES: CPT

## 2022-05-17 NOTE — PROGRESS NOTES
Rhett Hicks  : 1933  [place]  Secondary: Orlando Lazo at [place] [address] [facility], [address] [place] [address],[address], [place] [address].  [phone]   [phone]     OUTPATIENT PHYSICAL THERAPY: Daily Treatment Note 2022  Visit Count:  4    ICD-10: Treatment Diagnosis: Difficulty in walking, not elsewhere classified (R26.2)  Precautions/Allergies:   Pcn [penicillins]   TREATMENT PLAN:  Effective Dates: 2022 TO 2022 (90 days). Frequency/Duration: 2 times a week for 90 Day(s)    Pre-treatment Symptoms/Complaints:  Patient reports she feels more off balance today. Pain: Initial: Pain Intensity 1: 1  Pain Location 1: Hip  Pain Orientation 1: Right  Post Session:  1/10   Medications Last Reviewed:  2022  Updated Objective Findings:  None Today  TREATMENT:     THERAPEUTIC EXERCISE: (45 minutes):  Exercises per grid below to improve mobility and strength. Required minimal verbal cues to promote proper body alignment. Progressed repetitions as indicated.    Date:  2022 Date:  2022 Date:  5/10/2022   Activity/Exercise Parameters Parameters Parameters   Nustep  Level 3 x 11 minutes Level 3 x 10 minutes Level 3 x 10 minutes   Standing hip flexion 15 reps bilateral 15 reps bilateral 15 reps bilateral   Standing hip abduction 15 reps bilateral 15 reps bilateral 15 reps bilateral   Standing hamstring curls 15 reps bilateral 15 reps bilateral 15 reps bilateral   Seated LAQS 15 reps bilateral 15 reps bilateral 15 reps bilateral   Step ups 6 inch  2x10 reps 6 inch  2x10 reps 6 inch  2x10 reps   Stepping over half foam Forward 10 reps each  Lateral 10 reps each Forward 10 reps each  Lateral 10 reps each Forward 10 reps each  Lateral 10 reps each   Blue foam Eyes open with head turns and eyes closed Eyes open and eyes closed Eyes open and eyes closed   Standing hip extension 15 reps bilateral  15 reps bilateral [employer] Portal  Treatment/Session Summary:    · Response to Treatment:  Patient tolerated additional time on Nustep without complaints. Patient progressing well with therapy. · Communication/Consultation:  None today  · Equipment provided today:  None today  · Recommendations/Intent for next treatment session: Next visit will focus on balance, strengthening, and gait.     Total Treatment Billable Duration:  45 minutes  PT Patient Time In/Time Out  Time In: 1300  Time Out: [facility], PT    Future Appointments   Date Time Provider Maki Topete   5/19/2022  1:45 PM Nasima Whitfield PT [facility]   4/14/2023 12:30 PM [facility]     Visit # Therapist initials Date Arrived NS/ Cx < 24 hr >24 hr Cx Visit Comments   1 PV 4/25/2022 X   Initial Assessment and Treatment   2 PV 5/5/2022 X      3 PV 5/10/2022 X      4 PV 5/17/2022 X                                                                                                                                      Abbreviations: NS = No Show; CX = cancelled

## 2022-05-19 ENCOUNTER — HOSPITAL ENCOUNTER (OUTPATIENT)
Dept: PHYSICAL THERAPY | Age: 87
Discharge: HOME | End: 2022-05-19
Payer: MEDICARE

## 2022-05-19 NOTE — PROGRESS NOTES
Wily Walker  : 1933  [place]  Secondary: Orlando Jolley 75 at [place] [address],[address], [facility] U 91.  [phone]   [phone]          OUTPATIENT DAILY NOTE    NAME/AGE/GENDER: Wily Walker is a 80 y.o. female. DATE: 2022    Patient cancelled (less than 24 hours ago) her appointment for today due to illness. Will plan to follow up on next scheduled visit.     Alie Krause, PT    Future Appointments   Date Time Provider Maki Topete   2023 12:30 PM [facility] OUTREACH INSURANCE [facility]IG [address] [facility]

## 2022-06-06 ENCOUNTER — HOSPITAL ENCOUNTER (OUTPATIENT)
Dept: PHYSICAL THERAPY | Age: 87
Setting detail: RECURRING SERIES
End: 2022-06-06
Payer: MEDICARE

## 2022-06-08 ENCOUNTER — HOSPITAL ENCOUNTER (OUTPATIENT)
Dept: PHYSICAL THERAPY | Age: 87
Setting detail: RECURRING SERIES
End: 2022-06-08
Payer: MEDICARE

## 2022-06-08 NOTE — PROGRESS NOTES
Antonina Felder  : 1933  Primary: Medicare Part A And B  Secondary: [address] at [facility]  [address] [place] [address], Sandro Boyer [address], [facility] [address]  [phone]   [phone]        Antonina Felder cancelled her appointment for today due to lack of transportation. Will follow-up next visit.   Thank you,  Rod Haque, PT    Future Appointments   Date Time Provider Caleb Mcneal   2022  3:15 PM Chelsi Bauer, PT [facility] SFE   2022  1:45 PM Trell Barksdale, PT [facility]   2022  1:00 PM Yoon Lopez, PT [facility] SFE   2022  1:45 PM Trell Shamir, PT [facility]   2022  3:15 PM Trell Barksdale, PT [facility]   2022  1:00 PM Trell Josebert, PT [facility]   2022  1:00 PM Yoon Mini, PT [facility] SFE   2022  1:30 PM Sirisha Greenfield MD [facility] AMB   2023 12:30 PM Elias 88 [address] [facility]   2023  1:00 PM Jonnie Mendoza MD [facility] AMB

## 2022-06-14 ENCOUNTER — HOSPITAL ENCOUNTER (OUTPATIENT)
Dept: PHYSICAL THERAPY | Age: 87
Setting detail: RECURRING SERIES
Discharge: HOME | End: 2022-06-17
Payer: MEDICARE

## 2022-06-14 PROCEDURE — 97110 THERAPEUTIC EXERCISES: CPT

## 2022-06-14 ASSESSMENT — PAIN DESCRIPTION - ORIENTATION: ORIENTATION: RIGHT;LEFT;UPPER

## 2022-06-14 ASSESSMENT — PAIN SCALES - GENERAL: PAINLEVEL_OUTOF10: 5

## 2022-06-14 ASSESSMENT — PAIN DESCRIPTION - LOCATION: LOCATION: NECK;SHOULDER

## 2022-06-14 NOTE — PROGRESS NOTES
Brenton Barreto  : 1933  Primary: Medicare Part A And B  Secondary: [address] @ [address] [address]  Phone: [phone]  Fax: [phone] Plan Frequency: 1-2 times a week for 90 days    Plan of Care/Certification Expiration Date: 22      PT Visit Info: Total # of Visits to Date: 5      OUTPATIENT PHYSICAL THERAPY:OP NOTE TYPE: Progress Report 2022               Episode  Appt Desk         Treatment Diagnosis: Treatment Diagnosis: Difficulty in walking, not elsewhere classified (R26.2)  * No diagnoses found *  Medical/Referring Diagnosis:  No admission diagnoses are documented for this encounter. Referring Physician:  Angelica Erickson MD Orders:  PT Eval and Treat   Return MD Appt:    Date of Onset:  Chronic  Allergies:  Penicillins  Restrictions/Precautions:    No data recordedNo data recorded   Medications Last Reviewed:  2022     SUBJECTIVE   History of Injury/Illness (Reason for Referral):  Patient reports getting progressively weaker over the past year. Patient has had one fall over the past couple of months. Patient reports tripping and falling over a rug. Patient ambulates with cane. Patient rates current pain level as 0/10 and current dizziness as 0/10. Patient has trouble lifting her leg up to get into her daughter's truck. Patient would like to get her legs stronger. Initial: Right,Left,Upper Neck,Shoulder 5/10 Post Session: Christo De Paz 5/10  Past Medical History/Comorbidities:   Ms. Zhane Aguirre  has a past medical history of Breast cancer ([facility] [address] CAD (coronary artery disease), GERD (gastroesophageal reflux disease), Hiatal hernia, High triglycerides, History of heart artery stent, History of kidney problems, and Hypertension. Ms. Zhane Aguirre  has a past surgical history that includes Coronary angioplasty with stent (); Vein Surgery;  Hysterectomy; Coronary artery bypass graft; Rotator cuff repair (Right); Hysterectomy; and Mastectomy (Left, 1990). Social History/Living Environment:   Lives With: Daughter  Type of Home: House  Home Layout: One level     Prior Level of Function/Work/Activity:   No data [address] recorded   Learning:   Does the patient/guardian have any barriers to learning?: No barriers  Will there be a co-learner?: No  What is the preferred language of the patient/guardian?: English  Is an  required?: No  How does the patient/guardian prefer to learn new concepts?: Listening; Reading; Demonstration     Fall Risk Scale: Total Score: 55  Call Fall Risk: High (45 and higher)     Dominant Side:  right handed    Personal Factors:        Sex:  female        Age:  80 y.o. OBJECTIVE   Observation/Orthostatic Postural Assessment: Forward head/rounded shoulders posture. Functional Mobility:         Gait/Ambulation:  Patient ambulates with a single point cane demonstrating decreased gait speed. Strength:          Hip flexion 4-/5, hip abduction 4/5, hip adduction 4/5, quadriceps 4+/5, hamstrings 4-/5, ankle dorsiflexion 4-/5, ankle plantarflexion 4/5. Sensation:         Within normal limits. Postural Control & Balance:  · Conde Balance Scale:  37/56.   (A score less than 45/56 indicates high risk of falls)    ASSESSMENT   Initial Assessment:  Ms. Dannie Cleveland presents with increased weakness, imbalance, and difficulty walking. Patient is at higher fall risk based on history of falls and score received on Conde Balance Scale. Patient would benefit from skilled physical therapy to address problems and goals. Thank you for this referral.      Progress note:  Patient has attended five scheduled physical therapy appointments from 4/25/2022 to 6/14/2022. Patient had to cancel several appointments due to lack of transportation.   Patient would benefit from continuing skilled physical therapy to address problems and goals. Thank you for this referral.      Problem List: (Impacting functional limitations):    1. Decreased Strength  2. Decreased Ambulation Ability/Technique  3. Decreased Balance  4. Decreased [place] with Home Exercise Program  Therapy Prognosis:   Therapy Prognosis: Excellent     Assessment Complexity:   Medium Complexity  PLAN   Effective Dates:  4/25/2022 TO Plan of Care/Certification Expiration Date: 07/24/22     Frequency/Duration: Plan Frequency: 1-2 times a week for 90 days     Interventions Planned (Treatment may consist of any combination of the following):    1. Balance Exercise  2. Gait Training  3. Home Exercise Program (HEP)  4. Therapeutic Exercise/Strengthening  Goals: (Goals have been discussed and agreed upon with patient.)  Short-Term Functional Goals: Time Frame: 30 days  1. Patient will be independent with home exercise to improve balance and strength. Goal met. 2.  Patient will score less than or equal to 15 seconds on Timed Up and Go Test indicating improved gait speed. Goal met. Discharge Goals: Time Frame: 90 days  1. Patient will score less than or equal to 12 seconds on Timed Up and Go Test indicating improved gait speed. Goal ongoing. 2.  Patient will score greater than or equal to 46/56 on Conde Balance Scale indicating improved balance and decreased risk of falls with daily activities. Goal ongoing  3. Patient will report improved ease getting up into her daughter's truck. Goal ongoing. Outcome Measure: Tool Used: Conde Balance Scale  Score:  Initial: 37/56 Most Recent: X/56 (Date: -- )   Interpretation of Score: Each section is scored on a 0-4 scale, 0 representing the patients inability to perform the task and 4 representing independence. The scores of each section are added together for a total score of 56. The higher the patients score, the more independent the patient is. Any score below 45 indicates increased risk for falls.     Tool Used: Timed Up and Go (TUG)  Score:  Initial: 18.5 seconds Most Recent: 14.4 seconds (Date: 6- )   Interpretation of Score: The test measures, in seconds, the time taken by an individual to stand up from a standard arm chair (seat height 46 cm [18 in], arm height 65 cm [25.6 in]), walk a distance of 3 meters (118 in, approx 10 ft), turn, walk back to the chair and sit down. If the individual takes longer than 14 seconds to complete TUG, this indicates risk for falls. MEDICAL NECESSITY:   · Patient is expected to demonstrate progress in strength and balance to improve safety during activites of daily living. REASON FOR SERVICES/OTHER COMMENTS:  · Patient continues to require skilled intervention due to higher fall risk with daily activities. Total Duration:  Time In: 1345  Time Out: 1430    Regarding Kip Anupamajonathon Roma's therapy, I certify that the treatment plan above will be carried out by a therapist or under their direction. Thank you for this referral,  Meghana Turner, PT     Referring Physician Signature: Jennifer Jean PA-C No Signature is Required for this note.         Post Session Pain  Charge Capture  PT Visit Info  POC Link  Treatment Note Link  MD Guidelines  MyChart
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
## 2022-06-15 ENCOUNTER — ANESTHESIA EVENT (OUTPATIENT)
Dept: SURGERY | Age: 87
DRG: 522 | End: 2022-06-15
Payer: MEDICARE

## 2022-06-15 ENCOUNTER — APPOINTMENT (OUTPATIENT)
Dept: GENERAL RADIOLOGY | Age: 87
DRG: 522 | End: 2022-06-15
Payer: MEDICARE

## 2022-06-15 ENCOUNTER — HOSPITAL ENCOUNTER (INPATIENT)
Age: 87
LOS: 6 days | Discharge: NURSING, REHAB OR LONG-TERM CARE | DRG: 522 | End: 2022-06-21
Attending: EMERGENCY MEDICINE
Payer: MEDICARE

## 2022-06-15 DIAGNOSIS — S72.002A CLOSED FRACTURE OF NECK OF LEFT FEMUR, INITIAL ENCOUNTER (HCC): Primary | ICD-10-CM

## 2022-06-15 DIAGNOSIS — F41.9 ANXIETY: ICD-10-CM

## 2022-06-15 DIAGNOSIS — S72.002A CLOSED DISPLACED FRACTURE OF LEFT FEMORAL NECK (HCC): ICD-10-CM

## 2022-06-15 PROBLEM — I25.10 CAD (CORONARY ARTERY DISEASE): Status: ACTIVE | Noted: 2019-06-25

## 2022-06-15 PROBLEM — I10 HYPERTENSION: Status: ACTIVE | Noted: 2019-06-25

## 2022-06-15 LAB
ABO + RH BLD: NORMAL
ALBUMIN SERPL-MCNC: 3.8 G/DL (ref 3.2–4.6)
ALBUMIN/GLOB SERPL: 1 {RATIO} (ref 1.2–3.5)
ALP SERPL-CCNC: 83 U/L (ref 50–136)
ALT SERPL-CCNC: 18 U/L (ref 12–65)
ANION GAP SERPL CALC-SCNC: 9 MMOL/L (ref 7–16)
AST SERPL-CCNC: 20 U/L (ref 15–37)
BASOPHILS # BLD: 0 K/UL (ref 0–0.2)
BASOPHILS NFR BLD: 0 % (ref 0–2)
BILIRUB SERPL-MCNC: 0.3 MG/DL (ref 0.2–1.1)
BLOOD GROUP ANTIBODIES SERPL: NORMAL
BUN SERPL-MCNC: 19 MG/DL (ref 8–23)
CALCIUM SERPL-MCNC: 9.6 MG/DL (ref 8.3–10.4)
CHLORIDE SERPL-SCNC: 104 MMOL/L (ref 98–107)
CO2 SERPL-SCNC: 26 MMOL/L (ref 21–32)
CREAT SERPL-MCNC: 1 MG/DL (ref 0.6–1)
DIFFERENTIAL METHOD BLD: ABNORMAL
EKG ATRIAL RATE: 85 BPM
EKG DIAGNOSIS: NORMAL
EKG P AXIS: -26 DEGREES
EKG P-R INTERVAL: 205 MS
EKG Q-T INTERVAL: 392 MS
EKG QRS DURATION: 100 MS
EKG QTC CALCULATION (BAZETT): 467 MS
EKG R AXIS: -25 DEGREES
EKG T AXIS: 59 DEGREES
EKG VENTRICULAR RATE: 85 BPM
EOSINOPHIL # BLD: 0 K/UL (ref 0–0.8)
EOSINOPHIL NFR BLD: 0 % (ref 0.5–7.8)
ERYTHROCYTE [DISTWIDTH] IN BLOOD BY AUTOMATED COUNT: 12.8 % (ref 11.9–14.6)
GLOBULIN SER CALC-MCNC: 3.9 G/DL (ref 2.3–3.5)
GLUCOSE SERPL-MCNC: 119 MG/DL (ref 65–100)
HCT VFR BLD AUTO: 34.9 % (ref 35.8–46.3)
HGB BLD-MCNC: 11.5 G/DL (ref 11.7–15.4)
IMM GRANULOCYTES # BLD AUTO: 0.1 K/UL (ref 0–0.5)
IMM GRANULOCYTES NFR BLD AUTO: 1 % (ref 0–5)
INR PPP: 1
LYMPHOCYTES # BLD: 1.6 K/UL (ref 0.5–4.6)
LYMPHOCYTES NFR BLD: 16 % (ref 13–44)
MAGNESIUM SERPL-MCNC: 2.1 MG/DL (ref 1.8–2.4)
MCH RBC QN AUTO: 30.4 PG (ref 26.1–32.9)
MCHC RBC AUTO-ENTMCNC: 33 G/DL (ref 31.4–35)
MCV RBC AUTO: 92.3 FL (ref 79.6–97.8)
MONOCYTES # BLD: 0.6 K/UL (ref 0.1–1.3)
MONOCYTES NFR BLD: 6 % (ref 4–12)
NEUTS SEG # BLD: 7.8 K/UL (ref 1.7–8.2)
NEUTS SEG NFR BLD: 77 % (ref 43–78)
NRBC # BLD: 0 K/UL (ref 0–0.2)
PLATELET # BLD AUTO: 160 K/UL (ref 150–450)
PMV BLD AUTO: 10.1 FL (ref 9.4–12.3)
POTASSIUM SERPL-SCNC: 4.2 MMOL/L (ref 3.5–5.1)
PROT SERPL-MCNC: 7.7 G/DL (ref 6.3–8.2)
PROTHROMBIN TIME: 13.1 SEC (ref 12.6–14.5)
RBC # BLD AUTO: 3.78 M/UL (ref 4.05–5.2)
SODIUM SERPL-SCNC: 139 MMOL/L (ref 136–145)
SPECIMEN EXP DATE BLD: NORMAL
WBC # BLD AUTO: 10.1 K/UL (ref 4.3–11.1)

## 2022-06-15 PROCEDURE — 85025 COMPLETE CBC W/AUTO DIFF WBC: CPT

## 2022-06-15 PROCEDURE — 51798 US URINE CAPACITY MEASURE: CPT

## 2022-06-15 PROCEDURE — 96376 TX/PRO/DX INJ SAME DRUG ADON: CPT

## 2022-06-15 PROCEDURE — 2580000003 HC RX 258: Performed by: FAMILY MEDICINE

## 2022-06-15 PROCEDURE — 83735 ASSAY OF MAGNESIUM: CPT

## 2022-06-15 PROCEDURE — 71045 X-RAY EXAM CHEST 1 VIEW: CPT

## 2022-06-15 PROCEDURE — 86901 BLOOD TYPING SEROLOGIC RH(D): CPT

## 2022-06-15 PROCEDURE — 73552 X-RAY EXAM OF FEMUR 2/>: CPT

## 2022-06-15 PROCEDURE — 6370000000 HC RX 637 (ALT 250 FOR IP): Performed by: FAMILY MEDICINE

## 2022-06-15 PROCEDURE — 51702 INSERT TEMP BLADDER CATH: CPT

## 2022-06-15 PROCEDURE — 6360000002 HC RX W HCPCS: Performed by: FAMILY MEDICINE

## 2022-06-15 PROCEDURE — 96374 THER/PROPH/DIAG INJ IV PUSH: CPT

## 2022-06-15 PROCEDURE — 6360000002 HC RX W HCPCS: Performed by: EMERGENCY MEDICINE

## 2022-06-15 PROCEDURE — 93005 ELECTROCARDIOGRAM TRACING: CPT | Performed by: EMERGENCY MEDICINE

## 2022-06-15 PROCEDURE — 2500000003 HC RX 250 WO HCPCS: Performed by: FAMILY MEDICINE

## 2022-06-15 PROCEDURE — 73502 X-RAY EXAM HIP UNI 2-3 VIEWS: CPT

## 2022-06-15 PROCEDURE — 85610 PROTHROMBIN TIME: CPT

## 2022-06-15 PROCEDURE — 99285 EMERGENCY DEPT VISIT HI MDM: CPT

## 2022-06-15 PROCEDURE — 80053 COMPREHEN METABOLIC PANEL: CPT

## 2022-06-15 PROCEDURE — 1100000000 HC RM PRIVATE

## 2022-06-15 RX ORDER — ACETAMINOPHEN 325 MG/1
650 TABLET ORAL EVERY 6 HOURS PRN
Status: DISCONTINUED | OUTPATIENT
Start: 2022-06-15 | End: 2022-06-21 | Stop reason: HOSPADM

## 2022-06-15 RX ORDER — CLONAZEPAM 0.5 MG/1
0.5 TABLET ORAL NIGHTLY
Status: DISCONTINUED | OUTPATIENT
Start: 2022-06-15 | End: 2022-06-21 | Stop reason: HOSPADM

## 2022-06-15 RX ORDER — POLYETHYLENE GLYCOL 3350 17 G/17G
17 POWDER, FOR SOLUTION ORAL DAILY PRN
Status: DISCONTINUED | OUTPATIENT
Start: 2022-06-15 | End: 2022-06-21 | Stop reason: HOSPADM

## 2022-06-15 RX ORDER — PANTOPRAZOLE SODIUM 40 MG/1
40 TABLET, DELAYED RELEASE ORAL
Status: DISCONTINUED | OUTPATIENT
Start: 2022-06-16 | End: 2022-06-21 | Stop reason: HOSPADM

## 2022-06-15 RX ORDER — SODIUM CHLORIDE 9 MG/ML
INJECTION, SOLUTION INTRAVENOUS PRN
Status: DISCONTINUED | OUTPATIENT
Start: 2022-06-15 | End: 2022-06-16 | Stop reason: SDUPTHER

## 2022-06-15 RX ORDER — OXYCODONE HYDROCHLORIDE 5 MG/1
5 TABLET ORAL EVERY 4 HOURS PRN
Status: DISCONTINUED | OUTPATIENT
Start: 2022-06-15 | End: 2022-06-21 | Stop reason: HOSPADM

## 2022-06-15 RX ORDER — ASCORBIC ACID 500 MG
500 TABLET ORAL DAILY
Status: DISCONTINUED | OUTPATIENT
Start: 2022-06-15 | End: 2022-06-21 | Stop reason: HOSPADM

## 2022-06-15 RX ORDER — METOPROLOL SUCCINATE 50 MG/1
25 TABLET, EXTENDED RELEASE ORAL DAILY
Status: DISCONTINUED | OUTPATIENT
Start: 2022-06-15 | End: 2022-06-21 | Stop reason: HOSPADM

## 2022-06-15 RX ORDER — ISOSORBIDE MONONITRATE 60 MG/1
60 TABLET, EXTENDED RELEASE ORAL DAILY
Status: DISCONTINUED | OUTPATIENT
Start: 2022-06-15 | End: 2022-06-21 | Stop reason: HOSPADM

## 2022-06-15 RX ORDER — ONDANSETRON 4 MG/1
4 TABLET, ORALLY DISINTEGRATING ORAL EVERY 8 HOURS PRN
Status: DISCONTINUED | OUTPATIENT
Start: 2022-06-15 | End: 2022-06-16 | Stop reason: SDUPTHER

## 2022-06-15 RX ORDER — ATORVASTATIN CALCIUM 10 MG/1
10 TABLET, FILM COATED ORAL DAILY
Status: DISCONTINUED | OUTPATIENT
Start: 2022-06-15 | End: 2022-06-16

## 2022-06-15 RX ORDER — HYDROMORPHONE HYDROCHLORIDE 1 MG/ML
1 INJECTION, SOLUTION INTRAMUSCULAR; INTRAVENOUS; SUBCUTANEOUS EVERY 4 HOURS PRN
Status: DISCONTINUED | OUTPATIENT
Start: 2022-06-15 | End: 2022-06-17

## 2022-06-15 RX ORDER — SODIUM CHLORIDE 0.9 % (FLUSH) 0.9 %
5-40 SYRINGE (ML) INJECTION PRN
Status: DISCONTINUED | OUTPATIENT
Start: 2022-06-15 | End: 2022-06-16 | Stop reason: SDUPTHER

## 2022-06-15 RX ORDER — AMLODIPINE BESYLATE 10 MG/1
10 TABLET ORAL DAILY
Status: DISCONTINUED | OUTPATIENT
Start: 2022-06-15 | End: 2022-06-21 | Stop reason: HOSPADM

## 2022-06-15 RX ORDER — SODIUM CHLORIDE 0.9 % (FLUSH) 0.9 %
5-40 SYRINGE (ML) INJECTION EVERY 12 HOURS SCHEDULED
Status: DISCONTINUED | OUTPATIENT
Start: 2022-06-15 | End: 2022-06-16 | Stop reason: SDUPTHER

## 2022-06-15 RX ORDER — ACETAMINOPHEN 650 MG/1
650 SUPPOSITORY RECTAL EVERY 6 HOURS PRN
Status: DISCONTINUED | OUTPATIENT
Start: 2022-06-15 | End: 2022-06-21 | Stop reason: HOSPADM

## 2022-06-15 RX ORDER — ONDANSETRON 2 MG/ML
4 INJECTION INTRAMUSCULAR; INTRAVENOUS EVERY 6 HOURS PRN
Status: DISCONTINUED | OUTPATIENT
Start: 2022-06-15 | End: 2022-06-16 | Stop reason: SDUPTHER

## 2022-06-15 RX ADMIN — ACETAMINOPHEN 650 MG: 325 TABLET ORAL at 18:17

## 2022-06-15 RX ADMIN — FENTANYL CITRATE 25 MCG: 50 INJECTION, SOLUTION INTRAMUSCULAR; INTRAVENOUS at 11:49

## 2022-06-15 RX ADMIN — Medication 5 UNITS: at 15:29

## 2022-06-15 RX ADMIN — OXYCODONE HYDROCHLORIDE AND ACETAMINOPHEN 500 MG: 500 TABLET ORAL at 15:28

## 2022-06-15 RX ADMIN — HYDROMORPHONE HYDROCHLORIDE 1 MG: 1 INJECTION, SOLUTION INTRAMUSCULAR; INTRAVENOUS; SUBCUTANEOUS at 12:57

## 2022-06-15 RX ADMIN — FENTANYL CITRATE 50 MCG: 50 INJECTION, SOLUTION INTRAMUSCULAR; INTRAVENOUS at 12:27

## 2022-06-15 RX ADMIN — SODIUM CHLORIDE, PRESERVATIVE FREE 10 ML: 5 INJECTION INTRAVENOUS at 21:59

## 2022-06-15 RX ADMIN — OXYCODONE 5 MG: 5 TABLET ORAL at 15:31

## 2022-06-15 RX ADMIN — ATORVASTATIN CALCIUM 10 MG: 10 TABLET, FILM COATED ORAL at 15:28

## 2022-06-15 RX ADMIN — CLONAZEPAM 0.5 MG: 0.5 TABLET ORAL at 21:58

## 2022-06-15 ASSESSMENT — ENCOUNTER SYMPTOMS: RESPIRATORY NEGATIVE: 1

## 2022-06-15 ASSESSMENT — PAIN DESCRIPTION - ORIENTATION
ORIENTATION: LEFT

## 2022-06-15 ASSESSMENT — PAIN DESCRIPTION - LOCATION
LOCATION: HIP
LOCATION: LEG
LOCATION: HIP
LOCATION: HIP;LEG
LOCATION: HIP;LEG
LOCATION: HIP
LOCATION: LEG

## 2022-06-15 ASSESSMENT — PAIN SCALES - GENERAL
PAINLEVEL_OUTOF10: 4
PAINLEVEL_OUTOF10: 8
PAINLEVEL_OUTOF10: 5
PAINLEVEL_OUTOF10: 2
PAINLEVEL_OUTOF10: 5
PAINLEVEL_OUTOF10: 0
PAINLEVEL_OUTOF10: 6
PAINLEVEL_OUTOF10: 6

## 2022-06-15 ASSESSMENT — PAIN DESCRIPTION - PAIN TYPE
TYPE: ACUTE PAIN
TYPE: ACUTE PAIN

## 2022-06-15 ASSESSMENT — PAIN DESCRIPTION - FREQUENCY
FREQUENCY: INTERMITTENT
FREQUENCY: INTERMITTENT

## 2022-06-15 ASSESSMENT — PAIN DESCRIPTION - ONSET
ONSET: GRADUAL
ONSET: GRADUAL

## 2022-06-15 ASSESSMENT — PAIN DESCRIPTION - DESCRIPTORS
DESCRIPTORS: STABBING;THROBBING
DESCRIPTORS: STABBING;THROBBING
DESCRIPTORS: THROBBING
DESCRIPTORS: ACHING
DESCRIPTORS: THROBBING
DESCRIPTORS: ACHING
DESCRIPTORS: THROBBING

## 2022-06-15 ASSESSMENT — PAIN - FUNCTIONAL ASSESSMENT
PAIN_FUNCTIONAL_ASSESSMENT: PREVENTS OR INTERFERES SOME ACTIVE ACTIVITIES AND ADLS
PAIN_FUNCTIONAL_ASSESSMENT: 0-10
PAIN_FUNCTIONAL_ASSESSMENT: ACTIVITIES ARE NOT PREVENTED

## 2022-06-15 NOTE — PROGRESS NOTES
[facility]        Consult Received:       Closed left hip femoral neck fracture. Patient discussed with Dr. Sandra Abraham. Will tentatively plan for surgery for tomorrow. Hospitalist to admit. NPO after midnight. Hold blood thinners.

## 2022-06-15 NOTE — PROGRESS NOTES
received request that PT and Family requested to see a [address] [place] [address]. PT was in bed with youngest Daughter and Grandchild at bedside. PT was tearful. PT was holding prayer book and a rosary. PT is [employer]. PT updated [address] [place] [address] on PT's fall, hospitalization, and upcoming surgery. PT  expressed her desire to take the Eucharist prior to her surgery. [place] is from [place] and moved to [place] five years ago to care for Daughter during Daughter's illness. PT watches mass as often as PT can, but PT has not attended mass and not received the Eucharist for several years. PT expressed that PT did not want to undergo surgery if possible without first receiving Eucharist.  made arrangements with [employer] to serve PT the Eucharist when possible noting that PT may be NPO due to surgery. [address] [place] [address] returned to PT and explained this. PT and Family understood this and thanked [address] [place] [address]. [address] [place] [address] offered comforting spiritual presence, active listening, and therapeutic communication. PT expressed importance of Fanny and Family. PT became Roman Catholic when PT  her . PT had five children.  offered prayer using traditional Roman Catholic prayers.  offered additional support. Rev. Julissa Vazquez M.Div.

## 2022-06-15 NOTE — ACP (ADVANCE CARE PLANNING)
Advance Care Planning   Healthcare Decision Maker:    Primary decision maker james Ernst [place] [phone]        Click here to complete Healthcare Decision Makers including selection of the Healthcare Decision Maker Relationship (ie \"Primary\"). Today we documented Decision Maker(s) consistent with Legal Next of Kin hierarchy.

## 2022-06-15 NOTE — ED TRIAGE NOTES
Pt arrives via EMS from doctors office. Pt had a fall to left hip. Denies hitting head. no LOC. Pt a/o x 4. EMS VS: hr 104 96% o2 bp 162/79  . Shortening to left leg.

## 2022-06-15 NOTE — ACP (ADVANCE CARE PLANNING)
[facility] Hospitalist Service  At the heart of better care     Advance Care Planning   Admit Date:  6/15/2022 11:00 AM   Name:  Rebecca Mendoza   Age:  80 y.o. Sex:  female  :  1933   MRN:  [id number]   Room:  [place] [address] is able to make her own decisions:   Yes    Other members present in the meeting:   Daughter    Patient / surrogate decision-maker directed:  FULL    Patient or surrogate consented to discussion of the current conditions, workup, management plans, prognosis, and understand the risk for further deterioration. Time spent: 17 minutes in direct discussion (face to face and/or over phone).       Signed:  Guerita Stephen DO

## 2022-06-15 NOTE — PROGRESS NOTES
TRANSFER - OUT REPORT:    Verbal report given to [facility] RN on Johana Newby  being transferred to room 704 for routine progression of patient care       Report consisted of patient's Situation, Background, Assessment and   Recommendations(SBAR). Information from the following report(s) ED SBAR was reviewed with the receiving nurse. Lines:   Peripheral IV 06/15/22 Left Antecubital (Active)        Opportunity for questions and clarification was provided.       Patient transported with:  [employer]

## 2022-06-15 NOTE — ED PROVIDER NOTES
[employer] Emergency Department Provider Note                   PCP:                King Campbell MD               Age: 80 y.o. Sex: female     No diagnosis found. DISPOSITION         New Prescriptions    No medications on file       Orders Placed This Encounter   Procedures    XR HIP 2-3 VW Michael Josey LEFT    EKG 12 Lead        Joan Connolly MD, MD 11:32 AM      MDM  Number of Diagnoses or Management Options  Closed fracture of neck of left femur, initial encounter [facility])  Diagnosis management comments: Both orthopedics and hospitalist are consulted for patient with femoral neck fracture on the left. Patient will be given pain control. Patient is maintained n.p.o. in case patient is available for surgical intervention today. Amount and/or Complexity of Data Reviewed  Clinical lab tests: ordered and reviewed  Tests in the radiology section of CPT®: reviewed and ordered  Decide to obtain previous medical records or to obtain history from someone other than the patient: yes  Discuss the patient with other providers: yes (Hospitalist and orthopedics)  Independent visualization of images, tracings, or specimens: yes    Risk of Complications, Morbidity, and/or Mortality  Presenting problems: high  Diagnostic procedures: high  Management options: high Tray Alarcon is a 80 y.o. female who presents to the Emergency Department with chief complaint of    Chief Complaint   Patient presents with    Fall      At her doctor's office for what she describes as a routine visit when she had a nonsyncopal fall not quite certain what provoked it landing onto her left hip and since that time has had significant left hip pain and with that shortening and rotation. Did not strike her head or lose consciousness.   Denies any other injury to her awareness and continues with denial of other injury with skeletal review at the time of exam.  Significant health issues include coronary artery bypass grafting and cardiac stents. The history is provided by the patient. Fall  The accident occurred 1 to 2 hours ago. The fall occurred while standing. She landed on a hard floor. The point of impact was the left hip. The pain is present in the left hip. Pertinent negatives include no fever. All other systems reviewed and are negative. Review of Systems   Constitutional: Negative for chills, diaphoresis and fever. HENT: Negative. Respiratory: Negative. Cardiovascular: Negative. Musculoskeletal: Positive for arthralgias. Left hip with shortening and external rotation   Psychiatric/Behavioral: Negative for confusion and decreased concentration. All other systems reviewed and are negative. Past Medical History:   Diagnosis Date    Breast cancer ([facility] [address]     left s/p chemo and mastectomy 1990    CAD (coronary artery disease)     GERD (gastroesophageal reflux disease)     Hiatal hernia     High triglycerides     History of heart artery stent     x 2    History of kidney problems     Hypertension         Past Surgical History:   Procedure Laterality Date    CORONARY ANGIOPLASTY WITH STENT PLACEMENT  2005    x 2    CORONARY ARTERY BYPASS GRAFT      HYSTERECTOMY      HYSTERECTOMY      MASTECTOMY Left 1990    ROTATOR CUFF REPAIR Right     VEIN SURGERY          Family History   Problem Relation Age of Onset    Hypertension Mother     Stroke Mother     Coronary Art Dis Father            Social Connections:     Frequency of Communication with Friends and Family: Not on file    Frequency of Social Gatherings with Friends and Family: Not on file    Attends Muslim Services: Not on file    Active Member of Clubs or Organizations: Not on file    Attends Club or Organization Meetings: Not on file    Marital Status: Not on file        Allergies   Allergen Reactions    Penicillins Hives        Vitals signs and nursing note reviewed.    Patient Vitals for the past 4 hrs:   Temp Pulse Resp BP SpO2   06/15/22 1108 98.2 °F (36.8 °C) 88 16 (!) 146/66 96 %          Physical Exam  Vitals and nursing note reviewed. Constitutional:       Comments: Pleasant cooperative alert/capable   HENT:      Head: Atraumatic. Right Ear: External ear normal.      Left Ear: External ear normal.      Nose: Nose normal.   Eyes:      General: No scleral icterus. Cardiovascular:      Rate and Rhythm: Normal rate. Pulmonary:      Effort: Pulmonary effort is normal.   Abdominal:      General: Abdomen is flat. Palpations: Abdomen is soft. Musculoskeletal:         General: Tenderness and signs of injury present. Cervical back: Normal range of motion and neck supple. Comments: Skeletal review with shortening and rotation to the left lower extremity consistent with probable hip fracture  Remainder of skeletal review has no acute changes. No changes to palpation of the cervical thoracic lumbar spine. Pelvis is stable. Upper extremities without injury. Right lower extremity is without injury. Does have shortening and rotation of the left lower extremity as mentioned   Skin:     General: Skin is warm. Neurological:      Mental Status: She is alert. Mental status is at baseline. Sensory: No sensory deficit. Motor: No weakness. Psychiatric:         Mood and Affect: Mood normal.         Behavior: Behavior normal.          Procedures    ED EKG Interpretation  EKG was interpreted in the absence of a cardiologist.    Rate: Rate: Normal  EKG Interpretation: EKG Interpretation: sinus rhythm  ST Segments: Normal ST segments - NO STEMI    Labs Reviewed - No data to display     XR HIP 2-3 VW W PELVIS LEFT    (Results Pending)                            Voice dictation software was used during the making of this note. This software is not perfect and grammatical and other typographical errors may be present. This note has not been completely proofread for errors.      Malik Cummings Ashley Pierce MD  06/15/22 5800

## 2022-06-16 ENCOUNTER — APPOINTMENT (OUTPATIENT)
Dept: GENERAL RADIOLOGY | Age: 87
DRG: 522 | End: 2022-06-16
Payer: MEDICARE

## 2022-06-16 ENCOUNTER — ANESTHESIA (OUTPATIENT)
Dept: SURGERY | Age: 87
DRG: 522 | End: 2022-06-16
Payer: MEDICARE

## 2022-06-16 ENCOUNTER — APPOINTMENT (OUTPATIENT)
Dept: PHYSICAL THERAPY | Age: 87
End: 2022-06-16
Payer: MEDICARE

## 2022-06-16 PROBLEM — I35.8 AORTIC VALVE SCLEROSIS: Status: ACTIVE | Noted: 2019-03-12

## 2022-06-16 PROBLEM — D63.1 ANEMIA DUE TO CHRONIC KIDNEY DISEASE TREATED WITH ERYTHROPOIETIN: Status: ACTIVE | Noted: 2020-01-23

## 2022-06-16 PROBLEM — N18.9 ANEMIA DUE TO CHRONIC KIDNEY DISEASE TREATED WITH ERYTHROPOIETIN: Status: ACTIVE | Noted: 2020-01-23

## 2022-06-16 LAB
ANION GAP SERPL CALC-SCNC: 8 MMOL/L (ref 7–16)
BASOPHILS # BLD: 0 K/UL (ref 0–0.2)
BASOPHILS NFR BLD: 0 % (ref 0–2)
BUN SERPL-MCNC: 26 MG/DL (ref 8–23)
CALCIUM SERPL-MCNC: 9 MG/DL (ref 8.3–10.4)
CHLORIDE SERPL-SCNC: 103 MMOL/L (ref 98–107)
CO2 SERPL-SCNC: 27 MMOL/L (ref 21–32)
CREAT SERPL-MCNC: 0.9 MG/DL (ref 0.6–1)
DIFFERENTIAL METHOD BLD: ABNORMAL
EOSINOPHIL # BLD: 0.1 K/UL (ref 0–0.8)
EOSINOPHIL NFR BLD: 1 % (ref 0.5–7.8)
ERYTHROCYTE [DISTWIDTH] IN BLOOD BY AUTOMATED COUNT: 12.9 % (ref 11.9–14.6)
GLUCOSE SERPL-MCNC: 94 MG/DL (ref 65–100)
HCT VFR BLD AUTO: 32 % (ref 35.8–46.3)
HGB BLD-MCNC: 10.3 G/DL (ref 11.7–15.4)
IMM GRANULOCYTES # BLD AUTO: 0 K/UL (ref 0–0.5)
IMM GRANULOCYTES NFR BLD AUTO: 0 % (ref 0–5)
LYMPHOCYTES # BLD: 2.4 K/UL (ref 0.5–4.6)
LYMPHOCYTES NFR BLD: 24 % (ref 13–44)
MCH RBC QN AUTO: 29.8 PG (ref 26.1–32.9)
MCHC RBC AUTO-ENTMCNC: 32.2 G/DL (ref 31.4–35)
MCV RBC AUTO: 92.5 FL (ref 79.6–97.8)
MM INDURATION, POC: 0 MM (ref 0–5)
MONOCYTES # BLD: 0.9 K/UL (ref 0.1–1.3)
MONOCYTES NFR BLD: 10 % (ref 4–12)
NEUTS SEG # BLD: 6.4 K/UL (ref 1.7–8.2)
NEUTS SEG NFR BLD: 65 % (ref 43–78)
NRBC # BLD: 0 K/UL (ref 0–0.2)
PLATELET # BLD AUTO: 162 K/UL (ref 150–450)
PMV BLD AUTO: 10.3 FL (ref 9.4–12.3)
POTASSIUM SERPL-SCNC: 4 MMOL/L (ref 3.5–5.1)
PPD, POC: NEGATIVE
RBC # BLD AUTO: 3.46 M/UL (ref 4.05–5.2)
SODIUM SERPL-SCNC: 138 MMOL/L (ref 136–145)
WBC # BLD AUTO: 9.9 K/UL (ref 4.3–11.1)

## 2022-06-16 PROCEDURE — 2580000003 HC RX 258: Performed by: ANESTHESIOLOGY

## 2022-06-16 PROCEDURE — 6370000000 HC RX 637 (ALT 250 FOR IP): Performed by: ORTHOPAEDIC SURGERY

## 2022-06-16 PROCEDURE — 0SRS0JA REPLACEMENT OF LEFT HIP JOINT, FEMORAL SURFACE WITH SYNTHETIC SUBSTITUTE, UNCEMENTED, OPEN APPROACH: ICD-10-PCS | Performed by: ORTHOPAEDIC SURGERY

## 2022-06-16 PROCEDURE — 6360000002 HC RX W HCPCS: Performed by: ORTHOPAEDIC SURGERY

## 2022-06-16 PROCEDURE — C1776 JOINT DEVICE (IMPLANTABLE): HCPCS | Performed by: ORTHOPAEDIC SURGERY

## 2022-06-16 PROCEDURE — 6370000000 HC RX 637 (ALT 250 FOR IP): Performed by: FAMILY MEDICINE

## 2022-06-16 PROCEDURE — 2500000003 HC RX 250 WO HCPCS: Performed by: NURSE ANESTHETIST, CERTIFIED REGISTERED

## 2022-06-16 PROCEDURE — 1100000000 HC RM PRIVATE

## 2022-06-16 PROCEDURE — 36415 COLL VENOUS BLD VENIPUNCTURE: CPT

## 2022-06-16 PROCEDURE — 6360000002 HC RX W HCPCS: Performed by: ANESTHESIOLOGY

## 2022-06-16 PROCEDURE — 2500000003 HC RX 250 WO HCPCS: Performed by: ORTHOPAEDIC SURGERY

## 2022-06-16 PROCEDURE — 6370000000 HC RX 637 (ALT 250 FOR IP): Performed by: ANESTHESIOLOGY

## 2022-06-16 PROCEDURE — 99232 SBSQ HOSP IP/OBS MODERATE 35: CPT | Performed by: ORTHOPAEDIC SURGERY

## 2022-06-16 PROCEDURE — 3700000000 HC ANESTHESIA ATTENDED CARE: Performed by: ORTHOPAEDIC SURGERY

## 2022-06-16 PROCEDURE — 85025 COMPLETE CBC W/AUTO DIFF WBC: CPT

## 2022-06-16 PROCEDURE — 2709999900 HC NON-CHARGEABLE SUPPLY: Performed by: ORTHOPAEDIC SURGERY

## 2022-06-16 PROCEDURE — 2580000003 HC RX 258: Performed by: ORTHOPAEDIC SURGERY

## 2022-06-16 PROCEDURE — 2580000003 HC RX 258: Performed by: NURSE ANESTHETIST, CERTIFIED REGISTERED

## 2022-06-16 PROCEDURE — 3600000014 HC SURGERY LEVEL 4 ADDTL 15MIN: Performed by: ORTHOPAEDIC SURGERY

## 2022-06-16 PROCEDURE — 7100000001 HC PACU RECOVERY - ADDTL 15 MIN: Performed by: ORTHOPAEDIC SURGERY

## 2022-06-16 PROCEDURE — 6360000002 HC RX W HCPCS: Performed by: NURSE ANESTHETIST, CERTIFIED REGISTERED

## 2022-06-16 PROCEDURE — 80048 BASIC METABOLIC PNL TOTAL CA: CPT

## 2022-06-16 PROCEDURE — 73502 X-RAY EXAM HIP UNI 2-3 VIEWS: CPT

## 2022-06-16 PROCEDURE — 3600000004 HC SURGERY LEVEL 4 BASE: Performed by: ORTHOPAEDIC SURGERY

## 2022-06-16 PROCEDURE — 3700000001 HC ADD 15 MINUTES (ANESTHESIA): Performed by: ORTHOPAEDIC SURGERY

## 2022-06-16 PROCEDURE — 2580000003 HC RX 258: Performed by: FAMILY MEDICINE

## 2022-06-16 PROCEDURE — 7100000000 HC PACU RECOVERY - FIRST 15 MIN: Performed by: ORTHOPAEDIC SURGERY

## 2022-06-16 DEVICE — BIPOLAR COMPONENT
Type: IMPLANTABLE DEVICE | Site: HIP | Status: FUNCTIONAL
Brand: UHR

## 2022-06-16 DEVICE — HIP STEM
Type: IMPLANTABLE DEVICE | Site: HIP | Status: FUNCTIONAL
Brand: OMNIFIT

## 2022-06-16 DEVICE — LOW FRICTION ION TREATMENT
Type: IMPLANTABLE DEVICE | Site: HIP | Status: FUNCTIONAL
Brand: C-TAPER HEAD

## 2022-06-16 DEVICE — COMPONENT TOT HIP CAPPED BPLR UPLR CEM STEM H4STRYKER] STRYKER CORP]: Type: IMPLANTABLE DEVICE | Site: HIP | Status: FUNCTIONAL

## 2022-06-16 RX ORDER — SODIUM CHLORIDE, SODIUM LACTATE, POTASSIUM CHLORIDE, CALCIUM CHLORIDE 600; 310; 30; 20 MG/100ML; MG/100ML; MG/100ML; MG/100ML
INJECTION, SOLUTION INTRAVENOUS CONTINUOUS PRN
Status: DISCONTINUED | OUTPATIENT
Start: 2022-06-16 | End: 2022-06-16 | Stop reason: SDUPTHER

## 2022-06-16 RX ORDER — LIDOCAINE HYDROCHLORIDE 20 MG/ML
INJECTION, SOLUTION EPIDURAL; INFILTRATION; INTRACAUDAL; PERINEURAL PRN
Status: DISCONTINUED | OUTPATIENT
Start: 2022-06-16 | End: 2022-06-16 | Stop reason: SDUPTHER

## 2022-06-16 RX ORDER — MIDAZOLAM HYDROCHLORIDE 2 MG/2ML
2 INJECTION, SOLUTION INTRAMUSCULAR; INTRAVENOUS
Status: DISCONTINUED | OUTPATIENT
Start: 2022-06-16 | End: 2022-06-16 | Stop reason: HOSPADM

## 2022-06-16 RX ORDER — SODIUM CHLORIDE 9 MG/ML
INJECTION, SOLUTION INTRAVENOUS PRN
Status: DISCONTINUED | OUTPATIENT
Start: 2022-06-16 | End: 2022-06-21 | Stop reason: HOSPADM

## 2022-06-16 RX ORDER — HYDROMORPHONE HYDROCHLORIDE 2 MG/ML
0.25 INJECTION, SOLUTION INTRAMUSCULAR; INTRAVENOUS; SUBCUTANEOUS EVERY 5 MIN PRN
Status: DISCONTINUED | OUTPATIENT
Start: 2022-06-16 | End: 2022-06-16 | Stop reason: HOSPADM

## 2022-06-16 RX ORDER — FENTANYL CITRATE 50 UG/ML
100 INJECTION, SOLUTION INTRAMUSCULAR; INTRAVENOUS
Status: DISCONTINUED | OUTPATIENT
Start: 2022-06-16 | End: 2022-06-16 | Stop reason: HOSPADM

## 2022-06-16 RX ORDER — HYDROMORPHONE HYDROCHLORIDE 2 MG/ML
0.5 INJECTION, SOLUTION INTRAMUSCULAR; INTRAVENOUS; SUBCUTANEOUS EVERY 5 MIN PRN
Status: DISCONTINUED | OUTPATIENT
Start: 2022-06-16 | End: 2022-06-16 | Stop reason: HOSPADM

## 2022-06-16 RX ORDER — VANCOMYCIN HYDROCHLORIDE 1 G/20ML
INJECTION, POWDER, LYOPHILIZED, FOR SOLUTION INTRAVENOUS PRN
Status: DISCONTINUED | OUTPATIENT
Start: 2022-06-16 | End: 2022-06-16 | Stop reason: ALTCHOICE

## 2022-06-16 RX ORDER — SODIUM CHLORIDE 0.9 % (FLUSH) 0.9 %
5-40 SYRINGE (ML) INJECTION PRN
Status: DISCONTINUED | OUTPATIENT
Start: 2022-06-16 | End: 2022-06-16 | Stop reason: HOSPADM

## 2022-06-16 RX ORDER — ONDANSETRON 2 MG/ML
4 INJECTION INTRAMUSCULAR; INTRAVENOUS EVERY 6 HOURS PRN
Status: DISCONTINUED | OUTPATIENT
Start: 2022-06-16 | End: 2022-06-21 | Stop reason: HOSPADM

## 2022-06-16 RX ORDER — ATORVASTATIN CALCIUM 10 MG/1
10 TABLET, FILM COATED ORAL NIGHTLY
Status: DISCONTINUED | OUTPATIENT
Start: 2022-06-16 | End: 2022-06-21 | Stop reason: HOSPADM

## 2022-06-16 RX ORDER — ONDANSETRON 2 MG/ML
4 INJECTION INTRAMUSCULAR; INTRAVENOUS
Status: DISCONTINUED | OUTPATIENT
Start: 2022-06-16 | End: 2022-06-16 | Stop reason: HOSPADM

## 2022-06-16 RX ORDER — DIPHENHYDRAMINE HYDROCHLORIDE 50 MG/ML
12.5 INJECTION INTRAMUSCULAR; INTRAVENOUS
Status: DISCONTINUED | OUTPATIENT
Start: 2022-06-16 | End: 2022-06-16 | Stop reason: HOSPADM

## 2022-06-16 RX ORDER — OXYCODONE HYDROCHLORIDE 5 MG/1
5 TABLET ORAL PRN
Status: COMPLETED | OUTPATIENT
Start: 2022-06-16 | End: 2022-06-16

## 2022-06-16 RX ORDER — SODIUM CHLORIDE 0.9 % (FLUSH) 0.9 %
5-40 SYRINGE (ML) INJECTION EVERY 12 HOURS SCHEDULED
Status: DISCONTINUED | OUTPATIENT
Start: 2022-06-16 | End: 2022-06-21 | Stop reason: HOSPADM

## 2022-06-16 RX ORDER — ONDANSETRON 4 MG/1
4 TABLET, ORALLY DISINTEGRATING ORAL EVERY 8 HOURS PRN
Status: DISCONTINUED | OUTPATIENT
Start: 2022-06-16 | End: 2022-06-21 | Stop reason: HOSPADM

## 2022-06-16 RX ORDER — SODIUM CHLORIDE, SODIUM LACTATE, POTASSIUM CHLORIDE, CALCIUM CHLORIDE 600; 310; 30; 20 MG/100ML; MG/100ML; MG/100ML; MG/100ML
INJECTION, SOLUTION INTRAVENOUS CONTINUOUS
Status: DISCONTINUED | OUTPATIENT
Start: 2022-06-16 | End: 2022-06-16 | Stop reason: HOSPADM

## 2022-06-16 RX ORDER — EPHEDRINE SULFATE/0.9% NACL/PF 50 MG/5 ML
SYRINGE (ML) INTRAVENOUS PRN
Status: DISCONTINUED | OUTPATIENT
Start: 2022-06-16 | End: 2022-06-16 | Stop reason: SDUPTHER

## 2022-06-16 RX ORDER — ACETAMINOPHEN 500 MG
1000 TABLET ORAL ONCE
Status: COMPLETED | OUTPATIENT
Start: 2022-06-16 | End: 2022-06-16

## 2022-06-16 RX ORDER — FENTANYL CITRATE 50 UG/ML
INJECTION, SOLUTION INTRAMUSCULAR; INTRAVENOUS PRN
Status: DISCONTINUED | OUTPATIENT
Start: 2022-06-16 | End: 2022-06-16 | Stop reason: SDUPTHER

## 2022-06-16 RX ORDER — OXYCODONE HYDROCHLORIDE 5 MG/1
10 TABLET ORAL PRN
Status: COMPLETED | OUTPATIENT
Start: 2022-06-16 | End: 2022-06-16

## 2022-06-16 RX ORDER — HYDROMORPHONE HYDROCHLORIDE 2 MG/ML
0.25 INJECTION, SOLUTION INTRAMUSCULAR; INTRAVENOUS; SUBCUTANEOUS EVERY 10 MIN PRN
Status: DISCONTINUED | OUTPATIENT
Start: 2022-06-16 | End: 2022-06-16 | Stop reason: HOSPADM

## 2022-06-16 RX ORDER — ONDANSETRON 2 MG/ML
INJECTION INTRAMUSCULAR; INTRAVENOUS PRN
Status: DISCONTINUED | OUTPATIENT
Start: 2022-06-16 | End: 2022-06-16 | Stop reason: SDUPTHER

## 2022-06-16 RX ORDER — SODIUM CHLORIDE 0.9 % (FLUSH) 0.9 %
5-40 SYRINGE (ML) INJECTION EVERY 12 HOURS SCHEDULED
Status: DISCONTINUED | OUTPATIENT
Start: 2022-06-16 | End: 2022-06-16 | Stop reason: HOSPADM

## 2022-06-16 RX ORDER — SODIUM CHLORIDE, SODIUM LACTATE, POTASSIUM CHLORIDE, CALCIUM CHLORIDE 600; 310; 30; 20 MG/100ML; MG/100ML; MG/100ML; MG/100ML
INJECTION, SOLUTION INTRAVENOUS CONTINUOUS
Status: DISCONTINUED | OUTPATIENT
Start: 2022-06-16 | End: 2022-06-16

## 2022-06-16 RX ORDER — SODIUM CHLORIDE 0.9 % (FLUSH) 0.9 %
5-40 SYRINGE (ML) INJECTION PRN
Status: DISCONTINUED | OUTPATIENT
Start: 2022-06-16 | End: 2022-06-21 | Stop reason: HOSPADM

## 2022-06-16 RX ORDER — PROPOFOL 10 MG/ML
INJECTION, EMULSION INTRAVENOUS PRN
Status: DISCONTINUED | OUTPATIENT
Start: 2022-06-16 | End: 2022-06-16 | Stop reason: SDUPTHER

## 2022-06-16 RX ADMIN — Medication 2 G: at 13:39

## 2022-06-16 RX ADMIN — ACETAMINOPHEN 1000 MG: 500 TABLET, FILM COATED ORAL at 11:32

## 2022-06-16 RX ADMIN — FENTANYL CITRATE 25 MCG: 50 INJECTION, SOLUTION INTRAMUSCULAR; INTRAVENOUS at 13:54

## 2022-06-16 RX ADMIN — LIDOCAINE HYDROCHLORIDE 100 MG: 20 INJECTION, SOLUTION EPIDURAL; INFILTRATION; INTRACAUDAL; PERINEURAL at 13:26

## 2022-06-16 RX ADMIN — ACETAMINOPHEN 650 MG: 325 TABLET ORAL at 23:02

## 2022-06-16 RX ADMIN — PANTOPRAZOLE SODIUM 40 MG: 40 TABLET, DELAYED RELEASE ORAL at 09:47

## 2022-06-16 RX ADMIN — SODIUM CHLORIDE, POTASSIUM CHLORIDE, SODIUM LACTATE AND CALCIUM CHLORIDE: 600; 310; 30; 20 INJECTION, SOLUTION INTRAVENOUS at 11:33

## 2022-06-16 RX ADMIN — PROPOFOL 100 MG: 10 INJECTION, EMULSION INTRAVENOUS at 13:26

## 2022-06-16 RX ADMIN — SODIUM CHLORIDE, SODIUM LACTATE, POTASSIUM CHLORIDE, AND CALCIUM CHLORIDE: 600; 310; 30; 20 INJECTION, SOLUTION INTRAVENOUS at 13:16

## 2022-06-16 RX ADMIN — ONDANSETRON 4 MG: 2 INJECTION INTRAMUSCULAR; INTRAVENOUS at 14:20

## 2022-06-16 RX ADMIN — OXYCODONE 5 MG: 5 TABLET ORAL at 15:06

## 2022-06-16 RX ADMIN — HYDROMORPHONE HYDROCHLORIDE 0.25 MG: 2 INJECTION, SOLUTION INTRAMUSCULAR; INTRAVENOUS; SUBCUTANEOUS at 12:02

## 2022-06-16 RX ADMIN — Medication 10 MG: at 14:22

## 2022-06-16 RX ADMIN — ISOSORBIDE MONONITRATE 60 MG: 60 TABLET, EXTENDED RELEASE ORAL at 09:48

## 2022-06-16 RX ADMIN — METOPROLOL SUCCINATE 25 MG: 50 TABLET, EXTENDED RELEASE ORAL at 09:47

## 2022-06-16 RX ADMIN — CEFAZOLIN SODIUM 2000 MG: 100 INJECTION, POWDER, LYOPHILIZED, FOR SOLUTION INTRAVENOUS at 23:03

## 2022-06-16 RX ADMIN — CLONAZEPAM 0.5 MG: 0.5 TABLET ORAL at 23:03

## 2022-06-16 RX ADMIN — OXYCODONE 5 MG: 5 TABLET ORAL at 00:15

## 2022-06-16 RX ADMIN — PHENYLEPHRINE HYDROCHLORIDE 100 MCG: 10 INJECTION INTRAVENOUS at 13:26

## 2022-06-16 RX ADMIN — SODIUM CHLORIDE, PRESERVATIVE FREE 10 ML: 5 INJECTION INTRAVENOUS at 23:04

## 2022-06-16 RX ADMIN — SODIUM CHLORIDE, PRESERVATIVE FREE 10 ML: 5 INJECTION INTRAVENOUS at 09:49

## 2022-06-16 RX ADMIN — FENTANYL CITRATE 25 MCG: 50 INJECTION, SOLUTION INTRAMUSCULAR; INTRAVENOUS at 13:20

## 2022-06-16 ASSESSMENT — PAIN DESCRIPTION - FREQUENCY
FREQUENCY: INTERMITTENT
FREQUENCY: CONTINUOUS

## 2022-06-16 ASSESSMENT — PAIN DESCRIPTION - DESCRIPTORS
DESCRIPTORS: ACHING;DISCOMFORT;DULL
DESCRIPTORS: ACHING
DESCRIPTORS: ACHING

## 2022-06-16 ASSESSMENT — PAIN - FUNCTIONAL ASSESSMENT
PAIN_FUNCTIONAL_ASSESSMENT: PREVENTS OR INTERFERES SOME ACTIVE ACTIVITIES AND ADLS
PAIN_FUNCTIONAL_ASSESSMENT: ACTIVITIES ARE NOT PREVENTED
PAIN_FUNCTIONAL_ASSESSMENT: 0-10

## 2022-06-16 ASSESSMENT — PAIN SCALES - GENERAL
PAINLEVEL_OUTOF10: 3
PAINLEVEL_OUTOF10: 3
PAINLEVEL_OUTOF10: 0
PAINLEVEL_OUTOF10: 4
PAINLEVEL_OUTOF10: 7
PAINLEVEL_OUTOF10: 5

## 2022-06-16 ASSESSMENT — PAIN DESCRIPTION - ORIENTATION
ORIENTATION: LEFT
ORIENTATION: ANTERIOR
ORIENTATION: LEFT

## 2022-06-16 ASSESSMENT — PAIN DESCRIPTION - ONSET
ONSET: ON-GOING
ONSET: PROGRESSIVE

## 2022-06-16 ASSESSMENT — PAIN DESCRIPTION - LOCATION
LOCATION: HEAD
LOCATION: HIP
LOCATION: HIP

## 2022-06-16 ASSESSMENT — PAIN DESCRIPTION - PAIN TYPE
TYPE: SURGICAL PAIN
TYPE: ACUTE PAIN

## 2022-06-16 NOTE — ANESTHESIA PROCEDURE NOTES
Airway  Urgency: elective      General Information and Staff    Patient location during procedure: OR  Anesthesiologist: Dilip Juarez MD  Resident/CRNA: RODRIGO Gastelum - CRNA  Performed: resident/CRNA     Indications and Patient Condition  Indications for airway management: anesthesia  Spontaneous ventilation: present  Preoxygenated: yes  Patient position: sniffing  Mask difficulty assessment: vent by bag mask    Final Airway Details  Final airway type: supraglottic airway      Successful airway: Size 4    Number of attempts at approach: 1

## 2022-06-16 NOTE — ANESTHESIA PRE PROCEDURE
Department of Anesthesiology  Preprocedure Note       Name:  Gina Vasquez   Age:  80 y.o.  :  1933                                          MRN:  [id number]         Date:  2022      Surgeon: Teresa Pal):  Rodolfo Horan MD    Procedure: Procedure(s):  LEFT HIP HEMIARTHROPLASTY/ CHOICE    Medications prior to admission:   Prior to Admission medications    Medication Sig Start Date End Date Taking? Authorizing Provider   CALCIUM PO Take by mouth    Ar Automatic Reconciliation   amLODIPine (NORVASC) 10 MG tablet Take by mouth daily    Ar Automatic Reconciliation   ascorbic acid (VITAMIN C) 500 MG tablet Take by mouth    Ar Automatic Reconciliation   aspirin 81 MG EC tablet Take by mouth daily    Ar Automatic Reconciliation   vitamin D 25 MCG (1000 UT) CAPS Take by mouth daily    Ar Automatic Reconciliation   clonazePAM (KLONOPIN) 1 MG tablet Take 0.5 mg by mouth.     Ar Automatic Reconciliation   esomeprazole (NEXIUM) 40 MG delayed release capsule TAKE 1 CAPSULE BY MOUTH DAILY 21   Ar Automatic Reconciliation   isosorbide mononitrate (IMDUR) 60 MG extended release tablet Take 60 mg by mouth 21   Ar Automatic Reconciliation   meclizine (ANTIVERT) 25 MG tablet Take by mouth 3 times daily as needed  Patient not taking: Reported on 6/15/2022    Ar Automatic Reconciliation   metoprolol succinate (TOPROL XL) 25 MG extended release tablet Take 25 mg by mouth daily 20   Ar Automatic Reconciliation   nitroGLYCERIN (NITROSTAT) 0.4 MG SL tablet Place 0.4 mg under the tongue 21   Ar Automatic Reconciliation   simvastatin (ZOCOR) 40 MG tablet Take 40 mg by mouth 20   Ar Automatic Reconciliation       Current medications:    Current Facility-Administered Medications   Medication Dose Route Frequency Provider Last Rate Last Admin    fentaNYL (SUBLIMAZE) injection 100 mcg  100 mcg IntraVENous Once PRN Alistair Akers MD        lactated ringers infusion   IntraVENous Continuous Kalpesh Rutherford Claire Chavarria  mL/hr at 06/16/22 1133 New Bag at 06/16/22 1133    sodium chloride flush 0.9 % injection 5-40 mL  5-40 mL IntraVENous 2 times per day Hazel Ortiz MD        sodium chloride flush 0.9 % injection 5-40 mL  5-40 mL IntraVENous PRN Hazel Ortiz MD        midazolam PF (VERSED) injection 2 mg  2 mg IntraVENous Once PRN Hazel Ortiz MD        ceFAZolin (ANCEF) 2000 mg in sterile water 20 mL IV syringe  2,000 mg IntraVENous On Call to 1100 Gerardo Arroyo MD        amLODIPine (NORVASC) tablet 10 mg  10 mg Oral Daily Abdi Ditto, DO        ascorbic acid (VITAMIN C) tablet 500 mg  500 mg Oral Daily Abdi Ditto, DO   500 mg at 06/15/22 1528    pantoprazole (PROTONIX) tablet 40 mg  40 mg Oral QAM AC Abdi Ditto, DO   40 mg at 06/16/22 0947    isosorbide mononitrate (IMDUR) extended release tablet 60 mg  60 mg Oral Daily Abdi Ditto, DO   60 mg at 06/16/22 0948    metoprolol succinate (TOPROL XL) extended release tablet 25 mg  25 mg Oral Daily Abdi Ditto, DO   25 mg at 06/16/22 0947    atorvastatin (LIPITOR) tablet 10 mg  10 mg Oral Daily Abdi Ditto, DO   10 mg at 06/15/22 1528    clonazePAM (KLONOPIN) tablet 0.5 mg  0.5 mg Oral Nightly Abdi Ditto, DO   0.5 mg at 06/15/22 2158    sodium chloride flush 0.9 % injection 5-40 mL  5-40 mL IntraVENous 2 times per day Abdi Ditto, DO   10 mL at 06/16/22 0949    sodium chloride flush 0.9 % injection 5-40 mL  5-40 mL IntraVENous PRN Abdi Ditto, DO        0.9 % sodium chloride infusion   IntraVENous PRN Abdi Ditto, DO        ondansetron (ZOFRAN-ODT) disintegrating tablet 4 mg  4 mg Oral Q8H PRN Abdi Ditto, DO        Or    ondansetron [facility]) injection 4 mg  4 mg IntraVENous Q6H PRN Abdi Ditto, DO        polyethylene glycol (GLYCOLAX) packet 17 g  17 g Oral Daily PRN Abdi Huerta DO        acetaminophen (TYLENOL) tablet 650 mg  650 mg Oral Q6H PRN Abdi Huerta, DO   650 mg at 06/15/22 1817    Or    acetaminophen (TYLENOL) suppository 650 mg  650 mg Rectal Q6H PRN Sindhu Holding, DO        HYDROmorphone HCl PF (DILAUDID) injection 1 mg  1 mg IntraVENous Q4H PRN [employer], DO   1 mg at 06/15/22 1257    oxyCODONE (ROXICODONE) immediate release tablet 5 mg  5 mg Oral Q4H PRN [employer], DO   5 mg at 22 0015    tuberculin injection 5 Units  5 Units IntraDERmal Once Sindhu [employer], DO   5 Units at 06/15/22 1529       Allergies: Allergies   Allergen Reactions    Penicillins Hives       Problem List:    Patient Active Problem List   Diagnosis Code    Aortic valve sclerosis I35.8    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 N18.9, D63.1    Hypertension I10    CAD (coronary artery disease) I25.10    Closed displaced fracture of left femoral neck ([facility] [address] S72.002A       Past Medical History:        Diagnosis Date    Breast cancer ([facility] [address]     left s/p chemo and mastectomy     CAD (coronary artery disease)     GERD (gastroesophageal reflux disease)     Hiatal hernia     High triglycerides     History of heart artery stent     x 2    History of kidney problems     Hypertension        Past Surgical History:        Procedure Laterality Date    CORONARY ANGIOPLASTY WITH STENT PLACEMENT  2005    x 2    CORONARY ARTERY BYPASS GRAFT      HYSTERECTOMY (CERVIX STATUS UNKNOWN)      HYSTERECTOMY (CERVIX STATUS UNKNOWN)      MASTECTOMY Left     ROTATOR CUFF REPAIR Right     VEIN SURGERY         Social History:    Social History     Tobacco Use    Smoking status: Former Smoker     Quit date: 1988     Years since quittin.4    Smokeless tobacco: Never Used   Substance Use Topics    Alcohol use:  No                                Counseling given: Not Answered      Vital Signs (Current):   Vitals:    22 0015 22 0319 22 0811 22 1110   BP:  (!) 151/65 139/61 (!) 155/73   Pulse:  74 73 74 Resp: 16 18 16 16   Temp:  99.1 °F (37.3 °C) 98.1 °F (36.7 °C) 98.8 °F (37.1 °C)   TempSrc:  Oral Oral Oral   SpO2:  91% 92% 94%   Weight:    143 lb (64.9 kg)   Height:    5' 1\" (1.549 m)                                              BP Readings from Last 3 Encounters:   06/16/22 (!) 155/73   04/15/22 (!) 145/68   02/22/22 (!) 138/56       NPO Status: Time of last liquid consumption: 2230                        Time of last solid consumption: 2230                        Date of last liquid consumption: 06/15/22                        Date of last solid food consumption: 06/15/22    BMI:   Wt Readings from Last 3 Encounters:   06/16/22 143 lb (64.9 kg)   04/15/22 147 lb 3.2 oz (66.8 kg)   02/22/22 149 lb 12.8 oz (67.9 kg)     Body mass index is 27.02 kg/m². CBC:   Lab Results   Component Value Date    WBC 9.9 06/16/2022    RBC 3.46 06/16/2022    HGB 10.3 06/16/2022    HCT 32.0 06/16/2022    MCV 92.5 06/16/2022    RDW 12.9 06/16/2022     06/16/2022       CMP:   Lab Results   Component Value Date     06/16/2022    K 4.0 06/16/2022     06/16/2022    CO2 27 06/16/2022    BUN 26 06/16/2022    CREATININE 0.90 06/16/2022    GFRAA >60 06/16/2022    AGRATIO 1.0 04/15/2022    LABGLOM >60 06/16/2022    GLUCOSE 94 06/16/2022    PROT 7.7 06/15/2022    CALCIUM 9.0 06/16/2022    BILITOT 0.3 06/15/2022    ALKPHOS 83 06/15/2022    ALKPHOS 81 04/15/2022    AST 20 06/15/2022    ALT 18 06/15/2022       POC Tests: No results for input(s): POCGLU, POCNA, POCK, POCCL, POCBUN, POCHEMO, POCHCT in the last 72 hours.     Coags:   Lab Results   Component Value Date    PROTIME 13.1 06/15/2022    INR 1.0 06/15/2022       HCG (If Applicable): No results found for: PREGTESTUR, PREGSERUM, HCG, HCGQUANT     ABGs: No results found for: PHART, PO2ART, YPO3EGI, MUE2WMI, BEART, R3XWYVAS     Type & Screen (If Applicable):  No results found for: LABABO, LABRH    Drug/Infectious Status (If Applicable):  No results found for: HIV, HEPCAB    COVID-19 Screening (If Applicable): No results found for: COVID19        Anesthesia Evaluation  Patient summary reviewed and Nursing notes reviewed  Airway: Mallampati: II  TM distance: >3 FB   Neck ROM: full  Mouth opening: > = 3 FB   Dental:    (+) upper dentures  Comment: Denture and partial out    Pulmonary:Negative Pulmonary ROS breath sounds clear to auscultation                             Cardiovascular:  Exercise tolerance: Fairly active, walks with a cane  (+) CAD:, CABG/stent:, murmur: Grade 1, Aortic,         Rhythm: regular  Rate: normal                    Neuro/Psych:   Negative Neuro/Psych ROS              GI/Hepatic/Renal:   (+) GERD: well controlled, renal disease: CRI,           Endo/Other:    (+) blood dyscrasia: anemia:., .                 Abdominal:             Vascular: negative vascular ROS. Other Findings:           Anesthesia Plan      general     ASA 3       Induction: intravenous. Anesthetic plan and risks discussed with patient and child/children.                         Vivian Zuleta MD   6/16/2022

## 2022-06-16 NOTE — CONSULTS
Consult    Patient: Jim Renteria MRN: [id number]  SSN: [id number]    YOB: 1933  Age: 80 y.o. Sex: female      Subjective:      iJm Renteria is a 80 y.o. female who fell from a standing height landed on her left hip. She was seen the emergency room and found to have a displaced left femoral neck fracture. She denies any problems with her right hip or bilateral upper extremities. She did not have any pain in her left hip before. She says she gets around with a walking cane well.     Past Medical History:   Diagnosis Date    Breast cancer ([facility] [address]     left s/p chemo and mastectomy     CAD (coronary artery disease)     GERD (gastroesophageal reflux disease)     Hiatal hernia     High triglycerides     History of heart artery stent     x 2    History of kidney problems     Hypertension      Past Surgical History:   Procedure Laterality Date    CORONARY ANGIOPLASTY WITH STENT PLACEMENT  2005    x 2    CORONARY ARTERY BYPASS GRAFT      HYSTERECTOMY (CERVIX STATUS UNKNOWN)      HYSTERECTOMY (CERVIX STATUS UNKNOWN)      MASTECTOMY Left     ROTATOR CUFF REPAIR Right     VEIN SURGERY        FAMHX -No history of inflammatory arthritis   Social History     Tobacco Use    Smoking status: Former Smoker     Quit date: 1988     Years since quittin.4    Smokeless tobacco: Never Used   Substance Use Topics    Alcohol use: No      Current Facility-Administered Medications   Medication Dose Route Frequency Provider Last Rate Last Admin    ceFAZolin (ANCEF) 2000 mg in sterile water 20 mL IV syringe  2,000 mg IntraVENous On Call to 1100 Gerardo Arroyo MD        amLODIPine (NORVASC) tablet 10 mg  10 mg Oral Daily Abdi Huerta DO        ascorbic acid (VITAMIN C) tablet 500 mg  500 mg Oral Daily Abdi Huerta DO   500 mg at 06/15/22 1528    pantoprazole (PROTONIX) tablet 40 mg  40 mg Oral [facility] Candy Gomez, DO   40 mg at 22 0947    isosorbide mononitrate (IMDUR) extended release tablet 60 mg  60 mg Oral Daily [facility], DO   60 mg at 06/16/22 0948    metoprolol succinate (TOPROL XL) extended release tablet 25 mg  25 mg Oral Daily [facility], DO   25 mg at 06/16/22 0947    atorvastatin (LIPITOR) tablet 10 mg  10 mg Oral Daily [facility], DO   10 mg at 06/15/22 1528    clonazePAM (KLONOPIN) tablet 0.5 mg  0.5 mg Oral Nightly [facility], DO   0.5 mg at 06/15/22 2158    sodium chloride flush 0.9 % injection 5-40 mL  5-40 mL IntraVENous 2 times per day [facility], DO   10 mL at 06/16/22 0949    sodium chloride flush 0.9 % injection 5-40 mL  5-40 mL IntraVENous PRN [facility] Jero, DO        0.9 % sodium chloride infusion   IntraVENous PRN [facility], DO        ondansetron (ZOFRAN-ODT) disintegrating tablet 4 mg  4 mg Oral Q8H PRN [facility], DO        Or    ondansetron [facility]) injection 4 mg  4 mg IntraVENous Q6H PRN [facility], DO        polyethylene glycol (GLYCOLAX) packet 17 g  17 g Oral Daily PRN [facility] Jero, DO        acetaminophen (TYLENOL) tablet 650 mg  650 mg Oral Q6H PRN [facility], DO   650 mg at 06/15/22 1817    Or    acetaminophen (TYLENOL) suppository 650 mg  650 mg Rectal Q6H PRN [facility], DO        HYDROmorphone HCl PF (DILAUDID) injection 1 mg  1 mg IntraVENous Q4H PRN [facility], DO   1 mg at 06/15/22 1257    oxyCODONE (ROXICODONE) immediate release tablet 5 mg  5 mg Oral Q4H PRN [facility], DO   5 mg at 06/16/22 0015    tuberculin injection 5 Units  5 Units IntraDERmal Once [facility], DO   5 Units at 06/15/22 1529        Allergies   Allergen Reactions    Penicillins Hives       Review of Systems:  A comprehensive review of systems was negative.     Objective:     Vitals:    06/15/22 2327 06/16/22 0015 06/16/22 0319 06/16/22 0811   BP: (!) 128/57  (!) 151/65 139/61   Pulse: 67  74 73   Resp: 18 16 18 16   Temp: 99.3 °F (37.4 °C)  99.1 °F (37.3 °C) 98.1 °F (36.7 °C)   TempSrc: Oral  Oral Oral   SpO2: 92%  91% 92%   Weight:       Height:            Physical Exam:  Physical Exam:  General:  Alert, cooperative, no distress, appears stated age. Orientation alert oriented person place and situation   Eyes:  Conjunctivae/corneas clear. PERRL, EOMs intact. Fundi benign   Ears:  Normal TMs and external ear canals both ears. Nose: Nares normal. Septum midline. Mucosa normal. No drainage or sinus tenderness. Mouth/Throat: Lips, mucosa, and tongue normal. Teeth and gums normal.   Neck: Supple, symmetrical, trachea midline, no adenopathy, thyroid: no enlargment/tenderness/nodules, no carotid bruit and no JVD. Back:   Symmetric, no curvature. ROM normal. No CVA tenderness. Lungs:   Clear to auscultation bilaterally. Heart:  Regular rate and rhythm, S1, S2 normal, no murmur, click, rub or gallop. Abdomen:   Soft, non-tender. Bowel sounds normal. No masses,  No organomegaly. No lymphadenopathy in all 4 extremities  Alignment- pt has some obvious deformity of the left hip  Range of motion- with any range of motion leftt hip  Vascular-distal pulses palpable in left lower extremity  Sensory/motor-deep tendon reflexes normal left lower extremity. Motor and sensory function intact. Stability- is difficult to assess stability because of the presence of the fracture  Tenderness to palpation over the left greater trochanter area  Skin- no rashes ulcerations or open wounds left lower extremity  Gait- cannot put any weight on the left lower extremity      Assessment:     Hospital Problems           Last Modified POA    * (Principal) Closed displaced fracture of left femoral neck ([address] 6/15/2022 Yes    Aortic valve sclerosis 6/16/2022 Yes    Overview Signed 3/18/2022 11:21 PM by Zeke Mckay     Feb 2019 - normal SF and DF, AV sclerosis without stenosis.             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 6/16/2022 Yes Hypertension 6/15/2022 Yes    CAD (coronary artery disease) 6/15/2022 Yes         Closed displaced left femoral neck fracture    Xrays and or studies:    X-rays show a displaced left femoral neck fracture  Plan:     I have spoken with the patient regarding different treatment options. Unfortunately with the degree of displacement she has not with the geometry of her fracture I think the only reasonable option would be left hip hemiarthroplasty. I explained what this would involve and try to make sure he understands the nature of the procedure as well as a detailed list material risk associate with the procedure. After talk with her about this she seemed to feel comfortable consenting.   The plan will be to proceed with left hip hemiarthroplasty for left femoral neck fracture today in the operating room    Signed By: Fernando Romo MD

## 2022-06-16 NOTE — OP NOTE
Operative Report    Patient: Daniella Holman MRN: [id number]  SSN: [id number]    YOB: 1933  Age: 80 y.o. Sex: female       Date of Surgery: 6/16/2022    History:  Daniella Holman is a 80 y.o. female who fell from a standing height and injured her left hip. She was seen emergency room and found to have a left displaced femoral neck fracture. I did have a chance to talk to her and her family regarding the nature of her injury and the nature of the surgical procedure. I used milliradians to help her understand the nature of the injury as well as the nature of the surgical procedure. After talking to them about this they seem to feel comfortable consenting. I talked to the patient and/or their representative and explained the exact nature the procedure. I also went through a detailed list of the material risks associated with  the procedure which included risk of bleeding, infection, injury to nearby structures, worsening the situation, as well as the risks associate with anesthesia and finally death. Also talked with him regarding the benefits and alternatives to the procedure. Preoperative Diagnosis: [facility] left [facility] [S72.002A]     Postoperative Diagnosis: Closed displaced left femoral neck fracture    Surgeon(s) and Role:     * Siria Brooks MD - Primary    Anesthesia: General     Procedure: Left hip hemiarthroplasty for left femoral neck fracture/open treatment of left femoral neck fracture with prosthetic replacement    Procedure in Detail: After successful induction of general anesthesia patient was placed in the right lateral decubitus position the left hip was prepped draped usual sterile fashion I then utilized a standard anterolateral approach to the osteoporotic femoral neck.   The femoral neck was exposed and I made a provisional cut with an oscillating saw I then used a power corkscrew to remove the femoral head from the acetabulum. After this was done we sized this for a 44 mm femoral head. I then began the process of preparing the proximal femur using first a box osteotome then a canal finder and then starting with a #5 broach and ending with a #8 broach. The number a broach appeared to fill the canal very well. I then remove the broach and irrigated with a copious amount of normal saline for both the proximal femur as well as the acetabulum and then placed the actual stem corresponding with the size of the final broach. Once the stem was in place I placed a 28 mm head which was a +0 head as well as a 44 mm bipolar component and then reduced the hip and took the hip through full range of motion making sure that it was very stable. Once I was assured of this I then irrigated once more and reattached the gluteus minimus and medius tendons using #1 Vicryl in figure-of-eight fashion to suture the tendons directly back to the area of the proximal femur near the area of the greater trochanter. After this was done I closed the deep fascia with #2 strata fix suture and then the subcutaneous tissue with 2.0 strata fix suture and the skin was closed with staples. Dressings were applied. The patient was awakened and taken to PACU in stable condition        Estimated Blood Loss: 200 cc    Tourniquet Time: * No tourniquets in log *      Implants:   Implant Name Type Inv.  Item Serial No.  Lot No. LRB No. Used Action   STEM BPLR SZ 8 L140MM NK L30MM 36MM OFFSET 132DEG CO CHROME - [id number]  STEM BPLR SZ 8 L140MM NK L30MM 36MM OFFSET 132DEG [employer] CHROME  [facility] [id number]  1 Implanted   HEAD BPLR OD44MM ID28MM UNIV CO CHROM POLY FEM HIP - [id number]  HEAD BPLR OD44MM ID28MM UNIV CO CHROM POLY FEM HIP  [employer] [facility] [id number]  1 Implanted   HEAD FEM [id number] +0MM OFFSET C TAPR CO CHROM MTL ON POLY - [id number]  HEAD FEM [id number] +0MM OFFSET C TAPR CO CHROM MTL ON POLY  [employer] [facility] [id number] 1 Implanted               Specimens: * No specimens in log *        Drains: None                Complications: None    Counts: Sponge and needle counts were correct times two.     Signed By:  Jimmy James MD     June 16, 2022

## 2022-06-16 NOTE — PLAN OF CARE
Problem: Discharge Planning  Goal: Discharge to home or other facility with appropriate resources  Recent Flowsheet Documentation  Taken 6/15/2022 2024 by Roman Cummings RN  Discharge to home or other facility with appropriate resources: Identify barriers to discharge with patient and caregiver  6/15/2022 1542 by Arpit Reddy RN  Outcome: Progressing     Problem: Pain  Goal: Verbalizes/displays adequate comfort level or baseline comfort level  6/16/2022 0038 by Roman Cummings RN  Outcome: Progressing  6/15/2022 1542 by Arpit Reddy RN  Outcome: Progressing     Problem: Skin/Tissue Integrity  Goal: Absence of new skin breakdown  Description: 1. Monitor for areas of redness and/or skin breakdown  2. Assess vascular access sites hourly  3. Every 4-6 hours minimum:  Change oxygen saturation probe site  4. Every 4-6 hours:  If on nasal continuous positive airway pressure, respiratory therapy assess nares and determine need for appliance change or resting period.   6/16/2022 0038 by Roman Cummings RN  Outcome: Progressing  6/15/2022 1542 by Arpit Reddy RN  Outcome: Progressing     Problem: Safety - Adult  Goal: Free from fall injury  6/15/2022 1542 by Arpit Reddy RN  Outcome: Progressing

## 2022-06-16 NOTE — PROGRESS NOTES
Hospitalist Progress Note   Admit Date:  6/15/2022 11:00 AM   Name:  Imelda Rizvi   Age:  80 y.o. Sex:  female  :  1933   MRN:  [id number]   Room:  [facility]    Reason(s) for Admission: Closed fracture of neck of left femur, initial encounter ([facility] [S72.002A]  Closed displaced fracture of left femoral neck [facility]) [facility] Course & Interval History:   Ms. Andrew Mcclain is a very nice 79 y/o WF with a h/o CAD s/p CABG, HTN, breast cancer s/p mastectomy () and CKD who was admitted to our service on 6/15 after she fell at her PCP's office. She was there for a routine follow up visit and is unclear of the details, but fell and landed on her L side. She did not have chest pain, palpitations or lose consciousness. X-ray showed a L femoral neck fracture. Orthopedic Surgery consulted. Subjective/24hr Events (22): Comfortable in bed, pain is controlled, going for surgery later this morning. No chest pain or SOB. Assessment & Plan:   # L transcervical femoral neck fracture   - NPO, pain control, surgery planned for today    - Therapies post-op, likely needs placement. # CAD s/p CABG   - Statin    # H/o breast cancer   - S/p mastectomy,     # HTN   - Con't home medications    # GERD   - PPI    # CKD3   - At baseline. # Chronic normocytic anemia   - Due to CKD, no bleeding. Discharge Planning: Pending. Diet:  Diet NPO  DVT PPx: SCDs  Code status: Full Code    Hospital Problems           Last Modified POA    * (Principal) Closed displaced fracture of left femoral neck ([facility] 6/15/2022 Yes    Aortic valve sclerosis 2022 Yes    Overview Signed 3/18/2022 11:21 PM by Zeke Mckay     2019 - normal SF and DF, AV sclerosis without stenosis.             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 2022 Yes    Hypertension 6/15/2022 Yes    CAD (coronary artery disease) 6/15/2022 Yes          Objective:     Patient Vitals for the past 24 hrs: Temp Pulse Resp BP SpO2   06/16/22 0811 98.1 °F (36.7 °C) 73 16 139/61 92 %   06/16/22 0319 99.1 °F (37.3 °C) 74 18 (!) 151/65 91 %   06/16/22 0015 -- -- 16 -- --   06/15/22 2327 99.3 °F (37.4 °C) 67 18 (!) 128/57 92 %   06/15/22 1959 98.2 °F (36.8 °C) 70 16 125/61 91 %   06/15/22 1446 97.3 °F (36.3 °C) 72 16 (!) 151/64 94 %   06/15/22 1400 -- -- -- (!) 130/58 (!) 89 %   06/15/22 1257 -- -- 16 -- --   06/15/22 1227 -- -- 16 -- --   06/15/22 1220 -- -- -- 138/68 96 %   06/15/22 1149 -- -- 16 -- --   06/15/22 1108 98.2 °F (36.8 °C) 88 16 (!) 146/66 96 %       Estimated body mass index is 26.16 kg/m² as calculated from the following:    Height as of this encounter: 5' 2\" (1.575 m). Weight as of this encounter: 143 lb (64.9 kg). Intake/Output Summary (Last 24 hours) at 6/16/2022 1018  Last data filed at 6/16/2022 0949  Gross per 24 hour   Intake 250 ml   Output 550 ml   Net -300 ml         Physical Exam:   Blood pressure 139/61, pulse 73, temperature 98.1 °F (36.7 °C), temperature source Oral, resp. rate 16, height 5' 2\" (1.575 m), weight 143 lb (64.9 kg), SpO2 92 %. General:    Well nourished. No overt distress. Head:  Normocephalic, atraumatic  Eyes:  Sclerae appear normal. Pupils equally round. ENT:  Nares appear normal, no drainage. Dry oral mucosa  Neck:  No restricted ROM. Trachea midline. CV:   RRR. No m/r/g. No jugular venous distension. Lungs:   CTAB. No wheezing, rhonchi, or rales. Respirations even, unlabored. Abdomen: Bowel sounds present. Soft, nontender, nondistended. Extremities: No cyanosis or clubbing. No edema. Tenderness to palpation of the L hip/proximal femur. Skin:     No rashes and normal coloration. Warm and dry. Neuro:  CN II-XII grossly intact. Sensation intact. A&Ox3  Psych:  Normal mood and affect.       I have reviewed ordered lab tests and independently visualized imaging below:    Recent Labs:  Recent Results (from the past 48 hour(s))   EKG 12 Lead    Collection Time: 06/15/22 11:10 AM   Result Value Ref Range    Ventricular Rate 85 BPM    Atrial Rate 85 BPM    P-R Interval 205 ms    QRS Duration 100 ms    Q-T Interval 392 ms    QTc Calculation (Bazett) 467 ms    P Axis -26 degrees    R Axis -25 degrees    T Axis 59 degrees    Diagnosis Sinus rhythm    CMP    Collection Time: 06/15/22 11:52 AM   Result Value Ref Range    Sodium 139 136 - 145 mmol/L    Potassium 4.2 3.5 - 5.1 mmol/L    Chloride 104 98 - 107 mmol/L    CO2 26 21 - 32 mmol/L    Anion Gap 9 7 - 16 mmol/L    Glucose 119 (H) 65 - 100 mg/dL    BUN 19 8 - 23 MG/DL    CREATININE 1.00 0.6 - 1.0 MG/DL    GFR African American >60 >60 ml/min/1.73m2    GFR Non- 55 (L) >60 ml/min/1.73m2    Calcium 9.6 8.3 - 10.4 MG/DL    Total Bilirubin 0.3 0.2 - 1.1 MG/DL    ALT 18 12 - 65 U/L    AST 20 15 - 37 U/L    Alk Phosphatase 83 50 - 136 U/L    Total Protein 7.7 6.3 - 8.2 g/dL    Albumin 3.8 3.2 - 4.6 g/dL    Globulin 3.9 (H) 2.3 - 3.5 g/dL    Albumin/Globulin Ratio 1.0 (L) 1.2 - 3.5     CBC with Auto Differential    Collection Time: 06/15/22 11:52 AM   Result Value Ref Range    WBC 10.1 4.3 - 11.1 K/uL    RBC 3.78 (L) 4.05 - 5.2 M/uL    Hemoglobin 11.5 (L) 11.7 - 15.4 g/dL    Hematocrit 34.9 (L) 35.8 - 46.3 %    MCV 92.3 79.6 - 97.8 FL    MCH 30.4 26.1 - 32.9 PG    MCHC 33.0 31.4 - 35.0 g/dL    RDW 12.8 11.9 - 14.6 %    Platelets 047 828 - 835 K/uL    MPV 10.1 9.4 - 12.3 FL    nRBC 0.00 0.0 - 0.2 K/uL    Differential Type AUTOMATED      Seg Neutrophils 77 43 - 78 %    Lymphocytes 16 13 - 44 %    Monocytes 6 4.0 - 12.0 %    Eosinophils % 0 (L) 0.5 - 7.8 %    Basophils 0 0.0 - 2.0 %    Immature Granulocytes 1 0.0 - 5.0 %    Segs Absolute 7.8 1.7 - 8.2 K/UL    Absolute Lymph # 1.6 0.5 - 4.6 K/UL    Absolute Mono # 0.6 0.1 - 1.3 K/UL    Absolute Eos # 0.0 0.0 - 0.8 K/UL    Basophils Absolute 0.0 0.0 - 0.2 K/UL    Absolute Immature Granulocyte 0.1 0.0 - 0.5 K/UL   Protime-INR    Collection Time: 06/15/22 11:52 AM Result Value Ref Range    Protime 13.1 12.6 - 14.5 sec    INR 1.0     Magnesium    Collection Time: 06/15/22 11:52 AM   Result Value Ref Range    Magnesium 2.1 1.8 - 2.4 mg/dL   TYPE AND SCREEN    Collection Time: 06/15/22 12:58 PM   Result Value Ref Range    Crossmatch expiration date 06/18/2022,2359     ABO/Rh O POSITIVE     Antibody Screen NEG    Basic Metabolic Panel w/ Reflex to MG    Collection Time: 06/16/22  3:15 AM   Result Value Ref Range    Sodium 138 136 - 145 mmol/L    Potassium 4.0 3.5 - 5.1 mmol/L    Chloride 103 98 - 107 mmol/L    CO2 27 21 - 32 mmol/L    Anion Gap 8 7 - 16 mmol/L    Glucose 94 65 - 100 mg/dL    BUN 26 (H) 8 - 23 MG/DL    CREATININE 0.90 0.6 - 1.0 MG/DL    GFR African American >60 >60 ml/min/1.73m2    GFR Non- >60 >60 ml/min/1.73m2    Calcium 9.0 8.3 - 10.4 MG/DL   CBC with Auto Differential    Collection Time: 06/16/22  3:15 AM   Result Value Ref Range    WBC 9.9 4.3 - 11.1 K/uL    RBC 3.46 (L) 4.05 - 5.2 M/uL    Hemoglobin 10.3 (L) 11.7 - 15.4 g/dL    Hematocrit 32.0 (L) 35.8 - 46.3 %    MCV 92.5 79.6 - 97.8 FL    MCH 29.8 26.1 - 32.9 PG    MCHC 32.2 31.4 - 35.0 g/dL    RDW 12.9 11.9 - 14.6 %    Platelets 798 387 - 442 K/uL    MPV 10.3 9.4 - 12.3 FL    nRBC 0.00 0.0 - 0.2 K/uL    Differential Type AUTOMATED      Seg Neutrophils 65 43 - 78 %    Lymphocytes 24 13 - 44 %    Monocytes 10 4.0 - 12.0 %    Eosinophils % 1 0.5 - 7.8 %    Basophils 0 0.0 - 2.0 %    Immature Granulocytes 0 0.0 - 5.0 %    Segs Absolute 6.4 1.7 - 8.2 K/UL    Absolute Lymph # 2.4 0.5 - 4.6 K/UL    Absolute Mono # 0.9 0.1 - 1.3 K/UL    Absolute Eos # 0.1 0.0 - 0.8 K/UL    Basophils Absolute 0.0 0.0 - 0.2 K/UL    Absolute Immature Granulocyte 0.0 0.0 - 0.5 K/UL         Other Studies:  XR FEMUR LEFT (MIN 2 VIEWS)    Result Date: 6/15/2022  Exam: XR FEMUR LEFT (MIN 2 VIEWS) on 6/15/2022 12:56 PM Clinical History: The Female patient is 80years old  presenting for severe left hip pain.  Comparison: none Findings: 4 views of the left femur were obtained. Transcervical fracture of the left femoral neck is demonstrated with mild varus angulation. Joint spaces are well-maintained. 1. Transcervical left femoral neck fracture. XR CHEST 1 VIEW    Result Date: 6/15/2022  Exam: XR CHEST 1 VIEW on 6/15/2022 12:44 PM Clinical History: The Female patient is 80years old  presenting for fx protocol. Comparison:  none Findings:  Frontal view of the chest was obtained. There is mild elevation the right hemidiaphragm. The lungs are otherwise grossly clear. No pleural effusions are demonstrated. The cardiomediastinal silhouette is within normal limits. There are no acute osseous abnormalities. Surgical clips are seen throughout the left axilla. Sternotomy changes are demonstrated. 1. No acute cardiopulmonary process. CPT code(s) 94162     XR HIP 2-3 VW W PELVIS LEFT    Result Date: 6/15/2022  Exam: XR HIP 2-3 VW W PELVIS LEFT on 6/15/2022 12:45 PM Clinical History: The Female patient is 80years old  presenting for moderate to severe left hip pain. Comparison:  none Findings: 3 views of the pelvis and left hip were obtained. A transcervical fracture of the left femoral neck is demonstrated with mild varus angulation. Joint spaces are well-maintained. 1. Transcervical left femoral neck fracture.        Current Meds:  Current Facility-Administered Medications   Medication Dose Route Frequency    ceFAZolin (ANCEF) 2000 mg in sterile water 20 mL IV syringe  2,000 mg IntraVENous On Call to OR    amLODIPine (NORVASC) tablet 10 mg  10 mg Oral Daily    ascorbic acid (VITAMIN C) tablet 500 mg  500 mg Oral Daily    pantoprazole (PROTONIX) tablet 40 mg  40 mg Oral QAM AC    isosorbide mononitrate (IMDUR) extended release tablet 60 mg  60 mg Oral Daily    metoprolol succinate (TOPROL XL) extended release tablet 25 mg  25 mg Oral Daily    atorvastatin (LIPITOR) tablet 10 mg  10 mg Oral Daily    clonazePAM [facility]) tablet 0.5 mg  0.5 mg Oral Nightly    sodium chloride flush 0.9 % injection 5-40 mL  5-40 mL IntraVENous 2 times per day    sodium chloride flush 0.9 % injection 5-40 mL  5-40 mL IntraVENous PRN    0.9 % sodium chloride infusion   IntraVENous PRN    ondansetron (ZOFRAN-ODT) disintegrating tablet 4 mg  4 mg Oral Q8H PRN    Or    ondansetron (ZOFRAN) injection 4 mg  4 mg IntraVENous Q6H PRN    polyethylene glycol (GLYCOLAX) packet 17 g  17 g Oral Daily PRN    acetaminophen (TYLENOL) tablet 650 mg  650 mg Oral Q6H PRN    Or    acetaminophen (TYLENOL) suppository 650 mg  650 mg Rectal Q6H PRN    HYDROmorphone HCl PF (DILAUDID) injection 1 mg  1 mg IntraVENous Q4H PRN    oxyCODONE (ROXICODONE) immediate release tablet 5 mg  5 mg Oral Q4H PRN    tuberculin injection 5 Units  5 Units IntraDERmal Once       Signed:  Grey Garner MD    Part of this note may have been written by using a voice dictation software. The note has been proof read but may still contain some grammatical/other typographical errors.

## 2022-06-16 NOTE — PERIOP NOTE
TRANSFER - OUT REPORT:    Verbal report given to Caleb Jackson RN on Hilda Prajapati  being transferred to  floor for routine progression of patient care       Report consisted of patients Situation, Background, Assessment and   Recommendations(SBAR). Information from the following report(s) Nurse Handoff Report, Index, Surgery Report and Med Rec Status was reviewed with the receiving nurse. Lines:   Peripheral IV 06/15/22 Left Antecubital (Active)   Site Assessment Clean, dry & intact 06/16/22 1445   Line Status Blood return noted; Flushed;Normal saline locked 06/16/22 1445   Line Care Connections checked and tightened 06/16/22 1445   Phlebitis Assessment No symptoms 06/16/22 1445   Infiltration Assessment 0 06/16/22 1445   Alcohol Cap Used No 06/16/22 1445   Dressing Status Clean, dry & intact 06/16/22 1445   Dressing Type Transparent 06/16/22 1445       Peripheral IV 06/16/22 Left Hand (Active)   Site Assessment Clean, dry & intact 06/16/22 1445   Line Status Flushed 06/16/22 [address] Connections checked and tightened 06/16/22 1445   Phlebitis Assessment No symptoms 06/16/22 1445   Infiltration Assessment 0 06/16/22 1445   Alcohol Cap Used No 06/16/22 1445   Dressing Status Clean, dry & intact 06/16/22 1445   Dressing Type Transparent 06/16/22 1445        Opportunity for questions and clarification was provided. Patient transported with:   O2 @ 2 liters    VTE prophylaxis orders have been written for Hilda Prajapati. Patient and family given floor number and nurses name. Family updated re: pt status after security code verified.

## 2022-06-16 NOTE — ANESTHESIA POSTPROCEDURE EVALUATION
Department of Anesthesiology  Postprocedure Note    Patient: Taya Guzman  MRN: [id number]  YOB: 1933  Date of evaluation: 6/16/2022  Time:  0    Procedure Summary     Date: 06/16/22 Room / Location: [facility] MAIN OR  / [facility] MAIN OR    Anesthesia Start: 1316 Anesthesia Stop: 1446    Procedure: LEFT HIP HEMIARTHROPLASTY/ CHOICE (Left Hip) Diagnosis:       Closed fracture of left hip, initial encounter ([facility]      (Closed fracture of left hip, initial encounter ([facility] [S72.002A])    Providers: Kieran Boyce MD Responsible Provider: Kamron Carey MD    Anesthesia Type: general ASA Status: 3          Anesthesia Type: No value filed. Juan Luis Phase I: Juan Luis Score: 10    Juan Luis Phase II: Juan Luis Score: 10    Last vitals: Reviewed and per EMR flowsheets.        Anesthesia Post Evaluation    Patient location during evaluation: PACU  Patient participation: complete - patient participated  Level of consciousness: sleepy but conscious  Airway patency: patent  Nausea & Vomiting: no nausea  Complications: no  Cardiovascular status: blood pressure returned to baseline  Respiratory status: acceptable  Hydration status: euvolemic

## 2022-06-17 LAB
ERYTHROCYTE [DISTWIDTH] IN BLOOD BY AUTOMATED COUNT: 13.1 % (ref 11.9–14.6)
HCT VFR BLD AUTO: 25.7 % (ref 35.8–46.3)
HGB BLD-MCNC: 8.4 G/DL (ref 11.7–15.4)
MCH RBC QN AUTO: 30.4 PG (ref 26.1–32.9)
MCHC RBC AUTO-ENTMCNC: 32.7 G/DL (ref 31.4–35)
MCV RBC AUTO: 93.1 FL (ref 79.6–97.8)
MM INDURATION, POC: 0 MM (ref 0–5)
NRBC # BLD: 0 K/UL (ref 0–0.2)
PLATELET # BLD AUTO: 142 K/UL (ref 150–450)
PMV BLD AUTO: 10.3 FL (ref 9.4–12.3)
PPD, POC: NEGATIVE
RBC # BLD AUTO: 2.76 M/UL (ref 4.05–5.2)
SARS-COV-2: NORMAL
WBC # BLD AUTO: 11.6 K/UL (ref 4.3–11.1)

## 2022-06-17 PROCEDURE — 97162 PT EVAL MOD COMPLEX 30 MIN: CPT

## 2022-06-17 PROCEDURE — 51798 US URINE CAPACITY MEASURE: CPT

## 2022-06-17 PROCEDURE — 97535 SELF CARE MNGMENT TRAINING: CPT

## 2022-06-17 PROCEDURE — 2500000003 HC RX 250 WO HCPCS: Performed by: ORTHOPAEDIC SURGERY

## 2022-06-17 PROCEDURE — 6360000002 HC RX W HCPCS: Performed by: ORTHOPAEDIC SURGERY

## 2022-06-17 PROCEDURE — 1100000000 HC RM PRIVATE

## 2022-06-17 PROCEDURE — 6370000000 HC RX 637 (ALT 250 FOR IP): Performed by: ORTHOPAEDIC SURGERY

## 2022-06-17 PROCEDURE — 36415 COLL VENOUS BLD VENIPUNCTURE: CPT

## 2022-06-17 PROCEDURE — U0005 INFEC AGEN DETEC AMPLI PROBE: HCPCS

## 2022-06-17 PROCEDURE — 2580000003 HC RX 258: Performed by: ORTHOPAEDIC SURGERY

## 2022-06-17 PROCEDURE — 97165 OT EVAL LOW COMPLEX 30 MIN: CPT

## 2022-06-17 PROCEDURE — 85027 COMPLETE CBC AUTOMATED: CPT

## 2022-06-17 PROCEDURE — 97530 THERAPEUTIC ACTIVITIES: CPT

## 2022-06-17 PROCEDURE — 97110 THERAPEUTIC EXERCISES: CPT

## 2022-06-17 PROCEDURE — 6370000000 HC RX 637 (ALT 250 FOR IP): Performed by: INTERNAL MEDICINE

## 2022-06-17 RX ORDER — HYDROMORPHONE HYDROCHLORIDE 1 MG/ML
0.5 INJECTION, SOLUTION INTRAMUSCULAR; INTRAVENOUS; SUBCUTANEOUS EVERY 4 HOURS PRN
Status: DISCONTINUED | OUTPATIENT
Start: 2022-06-17 | End: 2022-06-21 | Stop reason: HOSPADM

## 2022-06-17 RX ADMIN — ATORVASTATIN CALCIUM 10 MG: 10 TABLET, FILM COATED ORAL at 20:48

## 2022-06-17 RX ADMIN — ASPIRIN 325 MG: 325 TABLET, COATED ORAL at 10:35

## 2022-06-17 RX ADMIN — ACETAMINOPHEN 650 MG: 325 TABLET ORAL at 10:41

## 2022-06-17 RX ADMIN — CEFAZOLIN SODIUM 2000 MG: 100 INJECTION, POWDER, LYOPHILIZED, FOR SOLUTION INTRAVENOUS at 07:25

## 2022-06-17 RX ADMIN — SODIUM CHLORIDE, PRESERVATIVE FREE 10 ML: 5 INJECTION INTRAVENOUS at 07:26

## 2022-06-17 RX ADMIN — PANTOPRAZOLE SODIUM 40 MG: 40 TABLET, DELAYED RELEASE ORAL at 07:25

## 2022-06-17 RX ADMIN — ISOSORBIDE MONONITRATE 60 MG: 60 TABLET, EXTENDED RELEASE ORAL at 10:37

## 2022-06-17 RX ADMIN — ATORVASTATIN CALCIUM 10 MG: 10 TABLET, FILM COATED ORAL at 00:06

## 2022-06-17 RX ADMIN — AMLODIPINE BESYLATE 10 MG: 10 TABLET ORAL at 10:35

## 2022-06-17 RX ADMIN — SODIUM CHLORIDE, PRESERVATIVE FREE 10 ML: 5 INJECTION INTRAVENOUS at 10:35

## 2022-06-17 RX ADMIN — CLONAZEPAM 0.5 MG: 0.5 TABLET ORAL at 20:48

## 2022-06-17 RX ADMIN — METOPROLOL SUCCINATE 25 MG: 50 TABLET, EXTENDED RELEASE ORAL at 10:35

## 2022-06-17 RX ADMIN — OXYCODONE HYDROCHLORIDE AND ACETAMINOPHEN 500 MG: 500 TABLET ORAL at 10:35

## 2022-06-17 RX ADMIN — SODIUM CHLORIDE, PRESERVATIVE FREE 10 ML: 5 INJECTION INTRAVENOUS at 20:48

## 2022-06-17 ASSESSMENT — PAIN SCALES - GENERAL
PAINLEVEL_OUTOF10: 0
PAINLEVEL_OUTOF10: 2
PAINLEVEL_OUTOF10: 0
PAINLEVEL_OUTOF10: 3

## 2022-06-17 ASSESSMENT — PAIN DESCRIPTION - PAIN TYPE: TYPE: ACUTE PAIN

## 2022-06-17 ASSESSMENT — PAIN DESCRIPTION - ORIENTATION: ORIENTATION: LEFT

## 2022-06-17 ASSESSMENT — PAIN DESCRIPTION - FREQUENCY: FREQUENCY: CONTINUOUS

## 2022-06-17 ASSESSMENT — PAIN - FUNCTIONAL ASSESSMENT: PAIN_FUNCTIONAL_ASSESSMENT: ACTIVITIES ARE NOT PREVENTED

## 2022-06-17 ASSESSMENT — PAIN DESCRIPTION - ONSET: ONSET: GRADUAL

## 2022-06-17 ASSESSMENT — PAIN DESCRIPTION - LOCATION: LOCATION: LEG

## 2022-06-17 ASSESSMENT — PAIN DESCRIPTION - DESCRIPTORS: DESCRIPTORS: ACHING

## 2022-06-17 NOTE — PROGRESS NOTES
WBAT LLE, Post precautions  PHYSICAL THERAPY Initial Assessment and Daily Note  (Link to Caseload Tracking: PT Visit Days : 1  Acknowledge Orders  Time In/Out  PT Charge Capture  Rehab Caseload Tracker    Orly Barbosa is a 80 y.o. female   PRIMARY DIAGNOSIS: Closed displaced fracture of left femoral neck (HCC)  Closed fracture of neck of left femur, initial encounter ([facility] [S72.002A]  Closed displaced fracture of left femoral neck (HCC) [S72.002A]  Procedure(s) (LRB):  LEFT HIP HEMIARTHROPLASTY/ CHOICE (Left)  1 Day Post-Op  Reason for Referral: Generalized Muscle Weakness (M62.81)  Difficulty in walking, Not elsewhere classified (R26.2)  Other abnormalities of gait and mobility (R26.89)  History of falling (Z91.81)  Inpatient: Payor: MEDICARE / Plan: MEDICARE PART A AND B / Product Type: *No Product type* /     ASSESSMENT:     REHAB RECOMMENDATIONS:   Recommendation to date pending progress:  Setting:   Short-term Rehab    Equipment:     Rolling Walker     ASSESSMENT:  Ms. Sarah Macario presents s/p fall with L femur fracture requiring surgical repair. She is independent at baseline and has recently started using a [address] for ambulation due to falls. She assists in her daughter's care due to cancer diagnosis and is responsible for cooking in their home. Today she is limited by pain, decreased ROM and strength due to post operative state. Pt needs mod A x2 to come sit EOB and then min A x2 to come to stand from a raised bed. She performs SPT and has difficulty coordinating pushing through her BUE so that she can put weight through the LLE. Pt with VC for improved safety and mobility throughout. She would continue to benefit from skilled PT to facilitate improvements in the above stated deficits and promote return to baseline. Pt will benefit from STR stay post DC.       [address] Mobility Inpatient Short Form  AM-PAC Mobility Inpatient   How much difficulty turning over in bed?: JANE Tolerance [] Patient limited by fatigue,Patient limited by pain    []      COGNITION/  PERCEPTION: Intact Impaired (Comments):   Orientation [x]     Vision [] Wears glasses all the time   Hearing [x]     Cognition  [x]       MOBILITY: I Mod I S SBA CGA Min Mod Max Total  NT x2 Comments:   Bed Mobility    Rolling [] [] [] [] [] [] [x] [] [] [] [x]    Supine to Sit [] [] [] [] [] [] [x] [] [] [] [x]    Scooting [] [] [] [x] [] [] [] [] [] [] [] Scooting in sitting   Sit to Supine [] [] [] [] [] [] [] [] [] [x] []    Transfers    Sit to Stand [] [] [] [] [] [x] [] [] [] [] [x]    Bed to Chair [] [] [] [] [] [x] [] [] [] [] [x] SPT   Stand to Sit [] [] [] [] [] [x] [] [] [] [] [x]     [] [] [] [] [] [] [] [] [] [] []    I=Independent, Mod I=Modified Independent, S=Supervision, SBA=Standby Assistance, CGA=Contact Guard Assistance,   Min=Minimal Assistance, Mod=Moderate Assistance, Max=Maximal Assistance, Total=Total Assistance, NT=Not Tested    GAIT: I Mod I S SBA CGA Min Mod Max Total  NT x2 Comments:   Level of Assistance [] [] [] [] [] [] [] [] [] [x] []    Distance   feet    DME N/A    Gait Quality N/A    Weightbearing Status Restrictions/Precautions  Restrictions/Precautions: Fall Risk  Required Braces or Orthoses?: No    Stairs      I=Independent, Mod I=Modified Independent, S=Supervision, SBA=Standby Assistance, CGA=Contact Guard Assistance,   Min=Minimal Assistance, Mod=Moderate Assistance, Max=Maximal Assistance, Total=Total Assistance, NT=Not Tested    PLAN:   ACUTE PHYSICAL THERAPY GOALS:   (Developed with and agreed upon by patient and/or caregiver. )  LTG:  (1.)Ms. St Pioche will move from supine to sit and sit to supine , scoot up and down and roll side to side in bed with INDEPENDENCE within 7 treatment day(s). (2.)Ms. Alma Rosa Schmitt will transfer from bed to chair and chair to bed with MODIFIED INDEPENDENCE using the least restrictive device within 7 treatment day(s). (3.)Ms. Brandon will ambulate with SUPERVISION for a minimum of 100 feet with the least restrictive device within 7 treatment day(s). (4.) Ms. Varela [facility] will tolerate 25+ minutes of therapeutic exercise with breaks as needed in order to demonstrate improved activity tolerance within 7 treatment days. ________________________________________________________________________________________________    FREQUENCY AND DURATION: BID for duration of hospital stay or until stated goals are met, whichever comes first.    THERAPY PROGNOSIS: Fair    PROBLEM LIST:   (Skilled intervention is medically necessary to address:)  Decreased ADL/Functional Activities  Decreased Activity Tolerance  Decreased AROM/PROM  Decreased Balance  Decreased Coordination  Decreased Gait Ability  Decreased Strength  Decreased Transfer Abilities  Increased Pain INTERVENTIONS PLANNED:   (Benefits and precautions of physical therapy have been discussed with the patient.)  Self Care Training  Therapeutic Activity  Therapeutic Exercise/HEP  Neuromuscular Re-education  Gait Training  Education       TREATMENT:   EVALUATION: MODERATE COMPLEXITY: (Untimed Charge)    TREATMENT:   Co-Treatment PT/OT necessary due to patient's decreased overall endurance/tolerance levels, as well as need for high level skilled assistance to complete functional transfers/mobility and functional tasks  Therapeutic Activity (20 Minutes): Therapeutic activity included Rolling, Supine to Sit, Scooting, Transfer Training, Ambulation on level ground, Sitting balance  and Standing balance to improve functional Activity tolerance, Balance, Coordination, Mobility, Strength and ROM. TREATMENT GRID:  N/A    AFTER TREATMENT PRECAUTIONS: Bed/Chair Locked, Call light within reach, Chair and Needs within reach    INTERDISCIPLINARY COLLABORATION:  RN/ PCT, PT/ PTA and OT/ RIOS    EDUCATION: Education Given To: Patient  Education Provided: Role of Therapy;Plan of Care;Equipment; Fall Prevention Strategies;Transfer Training;Precautions  Education Method: Verbal  Barriers to Learning: None  Education Outcome: Verbalized understanding    TIME IN/OUT:  Time In: 1237  Time Out: 0919  Minutes: Phillip Kaur, [place]

## 2022-06-17 NOTE — CARE COORDINATION
SW met with pt to discuss rehab needs. Pt requested referrals to [facility], [facility] and [facility]. Referrals submitted. Awaiting responses. PPD placed 6/15/22. COVID test ordered. SW following to facilitate pt's transfer to rehab at [address] [place] [address]:  Pt accepted for admission to [facility] AT [facility] and [facility]. SW spoke with pt's dtr via phone and presented bed offers and she accepted the bed at [facility]. Pt in agreement. Pt can transfer to SNF on Monday if she is medically cleared for dc.

## 2022-06-17 NOTE — PROGRESS NOTES
[facility]        2022         Post Op day: 1 Day Post-Op Procedure(s) (LRB):  LEFT HIP HEMIARTHROPLASTY/ CHOICE (Left)      Admit Date: 6/15/2022  Admit Diagnosis: Closed fracture of neck of left femur, initial encounter ([facility] [S72.002A]  Closed displaced fracture of left femoral neck (HCC) [S72.002A]       Principle Problem: Closed displaced fracture of left femoral neck ([facility] Subjective: Doing well, No complaints, No SOB, No Chest Pain, No Nausea or Vomiting     Objective:   Vital Signs are Stable, No Acute Distress, Alert,  Dressing is Dry,  Neurovascular exam is normal.     Assessment / Plan :  Patient Active Problem List   Diagnosis    Aortic valve sclerosis    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    Hypertension    CAD (coronary artery disease)    [facility]      Patient Vitals for the past 8 hrs:   BP Temp Temp src Pulse Resp SpO2   22 0748 (!) 117/55 98.2 °F (36.8 °C) Oral 85 18 96 %   22 0345 (!) 118/51 98.2 °F (36.8 °C) Oral 72 16 97 %    Temp (24hrs), Av.2 °F (36.8 °C), Min:97.5 °F (36.4 °C), Max:98.8 °F (37.1 °C)    Body mass index is 27.02 kg/m².     Lab Results   Component Value Date    HGB 8.4 2022          S/P Procedure(s) (LRB):  LEFT HIP HEMIARTHROPLASTY/ CHOICE (Left)      Medical Mgmt per hospitalist  Anticoagulation plan: ASA 325mg daily  Continue PT  Fall Precautions  DC disp: home w/ HH vs SNF   F/U: 2 weeks postop for wound check and staple removal        Signed By: DRU Alberto, PA  2022,  9:36 AM

## 2022-06-17 NOTE — PROGRESS NOTES
LLE WBAT; Post hip precautions  PHYSICAL THERAPY Daily Note  (Link to Caseload Tracking: PT Visit Days : 1  Time In/Out PT Charge Capture  Rehab Caseload Tracker  Orders      Irais King is a 80 y.o. female   PRIMARY DIAGNOSIS: Closed displaced fracture of left femoral neck (HCC)  Closed [facility] of left femur[facility] [S72.002A]  Closed displaced fracture of left femoral neck (HCC) [S72.002A]  Procedure(s) (LRB):  LEFT HIP HEMIARTHROPLASTY/ CHOICE (Left)  1 Day Post-Op  Inpatient: Payor: MEDICARE / Plan: MEDICARE PART A AND B / Product Type: *No Product type* /     ASSESSMENT:     REHAB RECOMMENDATIONS:   Recommendation to date pending progress:  Setting:   Short-term Rehab    Equipment:     To Be Determined     ASSESSMENT:  Ms. Sherie Bruce presents supine in bed upon entering her room. Her pain is well controlled and she is agreeable to performing supine therex for improved mobility and strength. No improvements this visit. PT will continue to follow.       SUBJECTIVE:   Ms. Sherie Bruce states, \"I feel better now that I'm in the bed\"     Social/Functional Lives With: Daughter  Type of Home: Mobile home  Home Layout: One level  Home Access: Stairs to enter with rails  Entrance Stairs - Number of Steps: 6  Home Equipment: Claude Sickle Help From: Family  ADL Assistance: Independent  Homemaking Assistance: Independent  Ambulation Assistance: Independent  Transfer Assistance: Independent  Active : Yes  Mode of Transportation: Car  Occupation: Retired  OBJECTIVE:     PAIN: VITALS / O2: PRECAUTION / Trevon Kurtz / Tez Good:   Pre Treatment:   Pain Assessment: None - Denies Pain  Pain Level: 0      Post Treatment: 0/10 Vitals        Oxygen  O2 Device: None (Room air)  SpO2: 97 % None    RESTRICTIONS/PRECAUTIONS:  Restrictions/Precautions  Restrictions/Precautions: Fall Risk  Required Braces or Orthoses?: No  Position Activity Restriction  Hip Precautions: No hip flexion > 90 degrees,No ABduction,No hip internal rotation  Restrictions/Precautions: Fall Risk  Required Braces or Orthoses?: No     MOBILITY: I Mod I S SBA CGA Min Mod Max Total  NT x2 Comments:   Bed Mobility    Rolling [] [] [] [] [] [] [] [] [] [x] []    Supine to Sit [] [] [] [] [] [] [] [] [] [x] []    Scooting [] [] [] [] [] [] [] [] [] [x] []    Sit to Supine [] [] [] [] [] [] [] [] [] [x] []    Transfers    Sit to Stand [] [] [] [] [] [] [] [] [] [x] []    Bed to Chair [] [] [] [] [] [] [] [] [] [x] []    Stand to Sit [] [] [] [] [] [] [] [] [] [x] []     [] [] [] [] [] [] [] [] [] [x] []    I=Independent, Mod I=Modified Independent, S=Supervision, SBA=Standby Assistance, CGA=Contact Guard Assistance,   Min=Minimal Assistance, Mod=Moderate Assistance, Max=Maximal Assistance, Total=Total Assistance, NT=Not Tested    BALANCE: Good Fair+ Fair Fair- Poor NT Comments   Sitting Static [] [] [] [] [] [x]    Sitting Dynamic [] [] [] [] [] [x]              Standing Static [] [] [] [] [] [x]    Standing Dynamic [] [] [] [] [] [x]      GAIT: I Mod I S SBA CGA Min Mod Max Total  NT x2 Comments:   Level of Assistance [] [] [] [] [] [] [] [] [] [x] []    Distance   feet    DME N/A    Gait Quality N/A    Weightbearing Status      Stairs      I=Independent, Mod I=Modified Independent, S=Supervision, SBA=Standby Assistance, CGA=Contact Guard Assistance,   Min=Minimal Assistance, Mod=Moderate Assistance, Max=Maximal Assistance, Total=Total Assistance, NT=Not Tested    PLAN:   ACUTE PHYSICAL THERAPY GOALS:   (Developed with and agreed upon by patient and/or caregiver. )  LTG:  (1.)Ms. Camryn Castro will move from supine to sit and sit to supine , scoot up and down and roll side to side in bed with INDEPENDENCE within 7 treatment day(s). (2.)Ms. Camryn Castro will transfer from bed to chair and chair to bed with MODIFIED INDEPENDENCE using the least restrictive device within 7 treatment day(s). (3.)Ms. Camryn Castro will ambulate with SUPERVISION for a minimum of 100 feet with the least restrictive device within 7 treatment day(s). (4.) Ms. Gissel Rivas will tolerate 25+ minutes of therapeutic exercise with breaks as needed in order to demonstrate improved activity tolerance within 7 treatment days. ________________________________________________________________________________________________       FREQUENCY AND DURATION: BID for duration of hospital stay or until stated goals are met, whichever comes first.    TREATMENT:   TREATMENT:   Therapeutic Exercise (10 Minutes): Therapeutic exercises noted below to improve functional activity tolerance, AROM, strength and mobility. TREATMENT GRID:     Date:  6/17 Date:   Date:     ACTIVITY/EXERCISE AM PM AM PM AM PM   AP  x10B       Quad sets  x10B       Glute sets  x10B       SAQ  x10B       Heel slides  x10B       ABD slides  x10B                B = bilateral; AA = active assistive; A = active; P = passive    AFTER TREATMENT PRECAUTIONS: Bed, Bed/Chair Locked, Call light within reach and Needs within reach    INTERDISCIPLINARY COLLABORATION:  RN/ PCT and PT/ PTA    EDUCATION: Education Given To: Patient  Education Provided: Role of Therapy;Plan of Care;Equipment; Fall Prevention Strategies;Transfer Training;Precautions  Education Method: Verbal  Barriers to Learning: None  Education Outcome: Verbalized understanding    TIME IN/OUT:  Time In: 1416  Time Out: [address]  Minutes: [address], [place]

## 2022-06-17 NOTE — PROGRESS NOTES
OCCUPATIONAL THERAPY Initial Assessment and Daily Note       OT Visit Days: 1  Acknowledge Orders  Time  OT Charge Capture  Rehab Caseload Tracker      Jeana Lopez is a 80 y.o. female   PRIMARY DIAGNOSIS: Closed displaced fracture of left femoral neck (HCC)  Closed fracture of neck of left femur, initial encounter ([facility] [S72.002A]  Closed displaced fracture of left femoral neck ([facility] [S72.002A]  Procedure(s) (LRB):  LEFT HIP HEMIARTHROPLASTY/ CHOICE (Left)  1 Day Post-Op  Reason for Referral: Generalized Muscle Weakness (M62.81)  Other lack of cordination (R27.8)  Difficulty in walking, Not elsewhere classified (R26.2)  Inpatient: Payor: MEDICARE / Plan: MEDICARE PART A AND B / Product Type: *No Product type* /     ASSESSMENT:     REHAB RECOMMENDATIONS:   Recommendation to date pending progress:  Setting:   Short-term Rehab    Equipment:     To Be Determined     ASSESSMENT:  Ms. Tete Haider presented to the hospital following a fall. Pt found to have a L hip fx and is now s/p L JOVANY with posterior hip precautions. Pt is WBAT LLE. At baseline, pt lives with her daughter (who she helps care for) and is independent for ADLs and majority of IADLs. Pt does not drive. Ambulates with cane. Today, pt was received supine in bed. Completed bed mobility modA x2. MaxA for LB dressing. Sit>stand and SPT to [facility] then chair Alirio x2. MaxA fo LB dressing. Educated on posterior hip precautions and implications on ADLs/mobiity--verbalized and demonstrated understanding. Recommend rehab at this time. Jeana Lopez currently demonstrates overall deficits in strength, balance, activity tolerance, and ADL performance. Patient would benefit from skilled OT services at this time in order to address functional deficits and OT goals stated above.      [employer] AM-PAC 6 Clicks Daily Activity Inpatient Short Form:    AM-PAC Daily Activity Inpatient   How much help for putting on and taking off regular lower body clothing?: A Lot  How much help for Bathing?: A Lot  How much help for Toileting?: A Lot  How much help for putting on and taking off regular upper body clothing?: A Little  How much help for taking care of personal grooming?: A Little  How much help for eating meals?: None  AM-PAC Inpatient Daily Activity Raw Score: 16  AM-PAC Inpatient ADL T-Scale Score : 35.96  ADL Inpatient CMS 0-100% Score: 53.32  ADL Inpatient CMS G-Code Modifier : CK      SUBJECTIVE:     Ms. Qi Luna states, \"I'm not a screamer--I didn't scream when I birthed my 5 kids. \"     Social/Functional Lives With: Daughter  Type of Home: Mobile home  Home Layout: One level  Home Access: Stairs to enter with rails  Entrance Stairs - Number of Steps: 6  Home Equipment: Elina Ang Help From: Family  ADL Assistance: Independent  Homemaking Assistance: Independent  Ambulation Assistance: Independent  Transfer Assistance: Independent  Active : Yes  Mode of Transportation: Car  Occupation: Retired    OBJECTIVE:     Jessica Palmer / Thierry Ortega / Angela Terryck: none    RESTRICTIONS/PRECAUTIONS:  Restrictions/Precautions: Fall Risk  Position Activity Restriction  Hip Precautions: No hip flexion > 90 degrees,No ABduction,No hip internal rotation    PAIN: VITALS / O2:   Pre Treatment:   Pain Assessment: None - Denies Pain      Post Treatment: none        Vitals          Oxygen   room air          GROSS EVALUATION: INTACT IMPAIRED   (See Comments)   UE AROM [x] []   UE PROM [x] []   Strength []   functional for bADLs, generalized weakness noted throughout BUEs   Posture / Balance []  fair+ sitting, fair standing    Sensation [x]     Coordination [x]       Tone [x]       Edema [x]    Activity Tolerance []   requires rest breaks, poor standing tolerance    Hand Dominance R [] L []      COGNITION/  PERCEPTION: INTACT IMPAIRED   (See Comments)   Orientation [x]     Vision [x]     Hearing [x]     Cognition  [x]     Perception [x]       MOBILITY: I Mod I S SBA CGA Min Mod Max Total NT x2 Comments:   Bed Mobility    Rolling [] [] [] [] [] [] [] [] [] [x] []    Supine to Sit [] [] [] [] [] [] [] [x] [] [] [x]    Scooting [] [] [] [] [] [] [] [] [] [x] []    Sit to Supine [] [] [] [] [] [] [] [] [] [x] [] Left sitting in the chair    Transfers    Sit to Stand [] [] [] [] [x] [] [] [] [] [] [x]    Bed to Chair [] [] [] [] [x] [] [] [] [] [] [x]    Stand to Sit [] [] [] [] [x] [] [] [] [] [] []    Tub/Shower [] [] [] [] [] [] [] [] [] [x] []     Toilet [] [] [] [] [x] [] [] [] [] [] [x] BSC    [] [] [] [] [] [] [] [] [] [] []    I=Independent, Mod I=Modified Independent, S=Supervision/Setup, SBA=Standby Assistance, CGA=Contact Guard Assistance, Min=Minimal Assistance, Mod=Moderate Assistance, Max=Maximal Assistance, Total=Total Assistance, NT=Not Tested    ACTIVITIES OF DAILY LIVING: I Mod I S SBA CGA Min Mod Max Total NT Comments   BASIC ADLs:              Upper Body Bathing  [] [] [] [] [] [] [] [] [] [x]    Lower Body Bathing [] [] [] [] [] [] [] [x] [] [] Socks    Toileting [] [] [] [] [] [] [] [x] [] [] Ashia-care and brief management ins standing    Upper Body Dressing [] [] [] [] [] [] [] [] [] [x]    Lower Body Dressing [] [] [] [] [] [] [] [x] [] [] Socks    Feeding [] [] [] [] [] [] [] [] [] [x]    Grooming [] [] [] [] [] [] [] [] [] [x]    Personal Device Care [] [] [] [] [] [] [] [] [] [x]    Functional Mobility [] [] [] [] [] [x] [] [] [] [] x2   I=Independent, Mod I=Modified Independent, S=Supervision/Setup, SBA=Standby Assistance, CGA=Contact Guard Assistance, Min=Minimal Assistance, Mod=Moderate Assistance, Max=Maximal Assistance, Total=Total Assistance, NT=Not Tested    PLAN:     FREQUENCY/DURATION   OT Plan of Care: 5 times/week for duration of hospital stay or until stated goals are met, whichever comes first.    ACUTE OCCUPATIONAL THERAPY GOALS:   (Developed with and agreed upon by patient and/or caregiver.)  1.  Patient will complete lower body bathing and dressing with MIN A and adaptive equipment as needed. 2.Patient will complete upper body bathing and dressing with MOD I and adaptive equipment as needed. 3. Patient will complete toileting with MIN A.   4. Patient will tolerate 25 minutes of OT treatment with 1-2 rest breaks to increase activity tolerance for ADLs. 5. Patient will complete functional transfers with CGA and adaptive equipment as needed. 6. Patient will complete functional activity with MOD I and adaptive equipment as needed. Timeframe: 7 visits     PROBLEM LIST:   (Skilled intervention is medically necessary to address:)  Decreased ADL/Functional Activities  Decreased Activity Tolerance  Decreased Balance  Decreased Coordination  Decreased Safety Awareness  Decreased Strength  Decreased Transfer Abilities   INTERVENTIONS PLANNED:  (Benefits and precautions of occupational therapy have been discussed with the patient.)  Self Care Training  Therapeutic Activity  Therapeutic Exercise/HEP  Neuromuscular Re-education  Manual Therapy  Education         TREATMENT:     EVALUATION: LOW COMPLEXITY: (Untimed Charge)    TREATMENT:   Co-Treatment PT/OT necessary due to patient's decreased overall endurance/tolerance levels, as well as need for high level skilled assistance to complete functional transfers/mobility and functional tasks  Self Care (15 minutes): Patient participated in toileting and lower body dressing ADLs in unsupported sitting and standing with no   cueing to increase independence, decrease assistance required, increase activity tolerance and increase safety awareness. Patient also participated in functional mobility, functional transfer and energy conservation training to increase independence, decrease assistance required, increase activity tolerance and increase safety awareness.      TREATMENT GRID:  N/A    AFTER TREATMENT PRECAUTIONS: Call light within reach, Chair, Needs within reach and RN notified    INTERDISCIPLINARY COLLABORATION:  RN/ PCT, PT/ PTA and OT/ RIOS    EDUCATION:  Education Given To: Patient  Education Provided: Role of Therapy;Plan of Care; Fall Prevention Strategies  Education Outcome: Verbalized understanding;Demonstrated understanding    TOTAL TREATMENT DURATION AND TIME:  Time In: 0181  Time Out: [address]  Minutes: [address] OT

## 2022-06-17 NOTE — PROGRESS NOTES
Hospitalist Progress Note   Admit Date:  6/15/2022 11:00 AM   Name:  Augusto Rodríguez   Age:  80 y.o. Sex:  female  :  1933   MRN:  [id number]   Room:  [facility]/    Reason(s) for Admission: Closed fracture of neck of left femur, initial encounter ([facility] [address] [S72.002A]  Closed displaced fracture of left femoral neck [facility]) [facility] Course & Interval History:   Ms. Kwadwo Douglas is a very nice 81 y/o WF with a h/o CAD s/p CABG, HTN, breast cancer s/p mastectomy () and CKD who was admitted to our service on 6/15 after she fell at her PCP's office. She was there for a routine follow up visit and is unclear of the details, but fell and landed on her L side. She did not have chest pain, palpitations or lose consciousness. X-ray showed a L femoral neck fracture. Orthopedic Surgery consulted and she underwent L hip hemiarthroplasty on . Subjective/24hr Events (22): Doing well this morning, hip pain is controlled, slept ok last night, good appetite, no N/V/D or chest pain otherwise. Assessment & Plan:   L transcervical femoral neck fracture              - S/p L hip alessandra on , ASA for DVT ppx   - Pain control adequate for now              - Follow up therapy recommendations     # CAD s/p CABG              - Statin     # H/o breast cancer              - S/p mastectomy,      # HTN              - Con't home medications     # GERD              - PPI     # CKD3              - At baseline.      # Chronic normocytic anemia              - Due to CKD, no bleeding. Discharge Planning: Pending, likely placement. Diet:  ADULT DIET; Regular  DVT PPx: SCDs,   Code status: Full Code    Hospital Problems           Last Modified POA    * (Principal) Closed displaced fracture of left femoral neck ([facility] [address] 6/15/2022 Yes    Aortic valve sclerosis 2022 Yes    Overview Signed 3/18/2022 11:21 PM by Zeke Mckay     2019 - normal SF and DF, AV sclerosis without stenosis. 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 6/16/2022 Yes    Hypertension 6/15/2022 Yes    CAD (coronary artery disease) 6/15/2022 Yes          Objective:     Patient Vitals for the past 24 hrs:   Temp Pulse Resp BP SpO2   06/17/22 0748 98.2 °F (36.8 °C) 85 18 (!) 117/55 96 %   06/17/22 0345 98.2 °F (36.8 °C) 72 16 (!) 118/51 97 %   06/16/22 2329 98.4 °F (36.9 °C) 77 16 115/61 97 %   06/16/22 1958 97.9 °F (36.6 °C) 83 16 (!) 119/57 95 %   06/16/22 1719 97.5 °F (36.4 °C) 71 17 (!) 117/56 96 %   06/16/22 1525 -- 73 -- 132/67 98 %   06/16/22 1520 -- 71 -- 125/62 96 %   06/16/22 1515 98.1 °F (36.7 °C) 77 15 (!) 107/55 97 %   06/16/22 1510 -- 80 -- 129/65 100 %   06/16/22 1505 -- 77 -- 131/68 95 %   06/16/22 1500 -- 75 -- 130/64 93 %   06/16/22 1454 -- 81 15 125/64 99 %   06/16/22 1449 -- 71 16 (!) 113/55 99 %   06/16/22 1445 98.2 °F (36.8 °C) 74 13 (!) 116/59 99 %   06/16/22 1444 -- 78 -- (!) 116/59 98 %   06/16/22 1205 -- -- -- -- 97 %   06/16/22 1110 98.8 °F (37.1 °C) 74 16 (!) 155/73 94 %       Estimated body mass index is 27.02 kg/m² as calculated from the following:    Height as of this encounter: 5' 1\" (1.549 m). Weight as of this encounter: 143 lb (64.9 kg). Intake/Output Summary (Last 24 hours) at 6/17/2022 0914  Last data filed at 6/17/2022 0616  Gross per 24 hour   Intake 810 ml   Output 1160 ml   Net -350 ml         Physical Exam:   Blood pressure (!) 117/55, pulse 85, temperature 98.2 °F (36.8 °C), temperature source Oral, resp. rate 18, height 5' 1\" (1.549 m), weight 143 lb (64.9 kg), SpO2 96 %. General:    Well nourished. No overt distress. Head:  Normocephalic, atraumatic  Eyes:  Sclerae appear normal. Pupils equally round. ENT:  Nares appear normal, no drainage. Moist oral mucosa  Neck:  No restricted ROM. Trachea midline. CV:   RRR. No m/r/g. No jugular venous distension. Lungs:   CTAB. No wheezing, rhonchi, or rales. Respirations even, unlabored. Abdomen:    Bowel sounds present. Soft, nontender, nondistended. Extremities: No cyanosis or clubbing. No edema. Surgical bandage to L hip, mild ttp to the area but bandage is clean and dry. Skin:     No rashes and normal coloration. Warm and dry. Neuro:  CN II-XII grossly intact. Sensation intact. A&Ox3  Psych:  Normal mood and affect.       I have reviewed ordered lab tests and independently visualized imaging below:    Recent Labs:  Recent Results (from the past 48 hour(s))   EKG 12 Lead    Collection Time: 06/15/22 11:10 AM   Result Value Ref Range    Ventricular Rate 85 BPM    Atrial Rate 85 BPM    P-R Interval 205 ms    QRS Duration 100 ms    Q-T Interval 392 ms    QTc Calculation (Bazett) 467 ms    P Axis -26 degrees    R Axis -25 degrees    T Axis 59 degrees    Diagnosis Sinus rhythm    CMP    Collection Time: 06/15/22 11:52 AM   Result Value Ref Range    Sodium 139 136 - 145 mmol/L    Potassium 4.2 3.5 - 5.1 mmol/L    Chloride 104 98 - 107 mmol/L    CO2 26 21 - 32 mmol/L    Anion Gap 9 7 - 16 mmol/L    Glucose 119 (H) 65 - 100 mg/dL    BUN 19 8 - 23 MG/DL    CREATININE 1.00 0.6 - 1.0 MG/DL    GFR African American >60 >60 ml/min/1.73m2    GFR Non- 55 (L) >60 ml/min/1.73m2    Calcium 9.6 8.3 - 10.4 MG/DL    Total Bilirubin 0.3 0.2 - 1.1 MG/DL    ALT 18 12 - 65 U/L    AST 20 15 - 37 U/L    Alk Phosphatase 83 50 - 136 U/L    Total Protein 7.7 6.3 - 8.2 g/dL    Albumin 3.8 3.2 - 4.6 g/dL    Globulin 3.9 (H) 2.3 - 3.5 g/dL    Albumin/Globulin Ratio 1.0 (L) 1.2 - 3.5     CBC with Auto Differential    Collection Time: 06/15/22 11:52 AM   Result Value Ref Range    WBC 10.1 4.3 - 11.1 K/uL    RBC 3.78 (L) 4.05 - 5.2 M/uL    Hemoglobin 11.5 (L) 11.7 - 15.4 g/dL    Hematocrit 34.9 (L) 35.8 - 46.3 %    MCV 92.3 79.6 - 97.8 FL    MCH 30.4 26.1 - 32.9 PG    MCHC 33.0 31.4 - 35.0 g/dL    RDW 12.8 11.9 - 14.6 %    Platelets 705 769 - 371 K/uL    MPV 10.1 9.4 - 12.3 FL    nRBC 0.00 0.0 - 0.2 K/uL    Differential Type AUTOMATED      Seg Neutrophils 77 43 - 78 %    Lymphocytes 16 13 - 44 %    Monocytes 6 4.0 - 12.0 %    Eosinophils % 0 (L) 0.5 - 7.8 %    Basophils 0 0.0 - 2.0 %    Immature Granulocytes 1 0.0 - 5.0 %    Segs Absolute 7.8 1.7 - 8.2 K/UL    Absolute Lymph # 1.6 0.5 - 4.6 K/UL    Absolute Mono # 0.6 0.1 - 1.3 K/UL    Absolute Eos # 0.0 0.0 - 0.8 K/UL    Basophils Absolute 0.0 0.0 - 0.2 K/UL    Absolute Immature Granulocyte 0.1 0.0 - 0.5 K/UL   Protime-INR    Collection Time: 06/15/22 11:52 AM   Result Value Ref Range    Protime 13.1 12.6 - 14.5 sec    INR 1.0     Magnesium    Collection Time: 06/15/22 11:52 AM   Result Value Ref Range    Magnesium 2.1 1.8 - 2.4 mg/dL   TYPE AND SCREEN    Collection Time: 06/15/22 12:58 PM   Result Value Ref Range    Crossmatch expiration date 06/18/2022,9962     ABO/Rh O POSITIVE     Antibody Screen NEG    Basic Metabolic Panel w/ Reflex to MG    Collection Time: 06/16/22  3:15 AM   Result Value Ref Range    Sodium 138 136 - 145 mmol/L    Potassium 4.0 3.5 - 5.1 mmol/L    Chloride 103 98 - 107 mmol/L    CO2 27 21 - 32 mmol/L    Anion Gap 8 7 - 16 mmol/L    Glucose 94 65 - 100 mg/dL    BUN 26 (H) 8 - 23 MG/DL    CREATININE 0.90 0.6 - 1.0 MG/DL    GFR African American >60 >60 ml/min/1.73m2    GFR Non- >60 >60 ml/min/1.73m2    Calcium 9.0 8.3 - 10.4 MG/DL   CBC with Auto Differential    Collection Time: 06/16/22  3:15 AM   Result Value Ref Range    WBC 9.9 4.3 - 11.1 K/uL    RBC 3.46 (L) 4.05 - 5.2 M/uL    Hemoglobin 10.3 (L) 11.7 - 15.4 g/dL    Hematocrit 32.0 (L) 35.8 - 46.3 %    MCV 92.5 79.6 - 97.8 FL    MCH 29.8 26.1 - 32.9 PG    MCHC 32.2 31.4 - 35.0 g/dL    RDW 12.9 11.9 - 14.6 %    Platelets 165 590 - 885 K/uL    MPV 10.3 9.4 - 12.3 FL    nRBC 0.00 0.0 - 0.2 K/uL    Differential Type AUTOMATED      Seg Neutrophils 65 43 - 78 %    Lymphocytes 24 13 - 44 %    Monocytes 10 4.0 - 12.0 %    Eosinophils % 1 0.5 - 7.8 %    Basophils 0 0.0 - 2.0 %    Immature Granulocytes 0 0.0 - 5.0 %    Segs Absolute 6.4 1.7 - 8.2 K/UL    Absolute Lymph # 2.4 0.5 - 4.6 K/UL    Absolute Mono # 0.9 0.1 - 1.3 K/UL    Absolute Eos # 0.1 0.0 - 0.8 K/UL    Basophils Absolute 0.0 0.0 - 0.2 K/UL    Absolute Immature Granulocyte 0.0 0.0 - 0.5 K/UL   PLEASE READ & DOCUMENT PPD TEST IN 24 HRS    Collection Time: 06/16/22  4:18 PM   Result Value Ref Range    PPD, (POC) Negative Negative    mm Induration 0 0 - 5 mm   CBC    Collection Time: 06/17/22  4:12 AM   Result Value Ref Range    WBC 11.6 (H) 4.3 - 11.1 K/uL    RBC 2.76 (L) 4.05 - 5.2 M/uL    Hemoglobin 8.4 (L) 11.7 - 15.4 g/dL    Hematocrit 25.7 (L) 35.8 - 46.3 %    MCV 93.1 79.6 - 97.8 FL    MCH 30.4 26.1 - 32.9 PG    MCHC 32.7 31.4 - 35.0 g/dL    RDW 13.1 11.9 - 14.6 %    Platelets 232 (L) 140 - 450 K/uL    MPV 10.3 9.4 - 12.3 FL    nRBC 0.00 0.0 - 0.2 K/uL         Other Studies:  XR FEMUR LEFT (MIN 2 VIEWS)    Result Date: 6/15/2022  Exam: XR FEMUR LEFT (MIN 2 VIEWS) on 6/15/2022 12:56 PM Clinical History: The Female patient is 80years old  presenting for severe left hip pain. Comparison:  none Findings: 4 views of the left femur were obtained. Transcervical fracture of the left femoral neck is demonstrated with mild varus angulation. Joint spaces are well-maintained. 1. Transcervical left femoral neck fracture. XR CHEST 1 VIEW    Result Date: 6/15/2022  Exam: XR CHEST 1 VIEW on 6/15/2022 12:44 PM Clinical History: The Female patient is 80years old  presenting for fx protocol. Comparison:  none Findings:  Frontal view of the chest was obtained. There is mild elevation the right hemidiaphragm. The lungs are otherwise grossly clear. No pleural effusions are demonstrated. The cardiomediastinal silhouette is within normal limits. There are no acute osseous abnormalities. Surgical clips are seen throughout the left axilla. Sternotomy changes are demonstrated. 1. No acute cardiopulmonary process.  CPT code(s) 63613     XR HIP 2-3 VW W PELVIS LEFT    Result Date: 6/16/2022  Exam: XR HIP 2-3 VW W PELVIS LEFT on 6/16/2022 4:12 PM Clinical History: The Female patient is 80years old  presenting for hip replacement. Comparison:  none Findings: 3 views of the pelvis and left hip were obtained. No fracture or dislocation is identified. A bipolar  left hip prosthesis is in place with anatomic alignment and positioning demonstrated. There are overlying surgical staples with postoperative changes in the subcutaneous tissues     1. Bipolar left hip prosthesis. XR HIP 2-3 VW W PELVIS LEFT    Result Date: 6/15/2022  Exam: XR HIP 2-3 VW W PELVIS LEFT on 6/15/2022 12:45 PM Clinical History: The Female patient is 80years old  presenting for moderate to severe left hip pain. Comparison:  none Findings: 3 views of the pelvis and left hip were obtained. A transcervical fracture of the left femoral neck is demonstrated with mild varus angulation. Joint spaces are well-maintained. 1. Transcervical left femoral neck fracture.        Current Meds:  Current Facility-Administered Medications   Medication Dose Route Frequency    sodium chloride flush 0.9 % injection 5-40 mL  5-40 mL IntraVENous 2 times per day    sodium chloride flush 0.9 % injection 5-40 mL  5-40 mL IntraVENous PRN    0.9 % sodium chloride infusion   IntraVENous PRN    ondansetron (ZOFRAN-ODT) disintegrating tablet 4 mg  4 mg Oral Q8H PRN    Or    ondansetron (ZOFRAN) injection 4 mg  4 mg IntraVENous Q6H PRN    aspirin EC tablet 325 mg  325 mg Oral Daily    atorvastatin (LIPITOR) tablet 10 mg  10 mg Oral Nightly    amLODIPine (NORVASC) tablet 10 mg  10 mg Oral Daily    ascorbic acid (VITAMIN C) tablet 500 mg  500 mg Oral Daily    pantoprazole (PROTONIX) tablet 40 mg  40 mg Oral QAM AC    isosorbide mononitrate (IMDUR) extended release tablet 60 mg  60 mg Oral Daily    metoprolol succinate (TOPROL XL) extended release tablet 25 mg  25 mg Oral Daily    clonazePAM (KLONOPIN) tablet 0.5 mg 0.5 mg Oral Nightly    polyethylene glycol (GLYCOLAX) packet 17 g  17 g Oral Daily PRN    acetaminophen (TYLENOL) tablet 650 mg  650 mg Oral Q6H PRN    Or    acetaminophen (TYLENOL) suppository 650 mg  650 mg Rectal Q6H PRN    HYDROmorphone HCl PF (DILAUDID) injection 1 mg  1 mg IntraVENous Q4H PRN    oxyCODONE (ROXICODONE) immediate release tablet 5 mg  5 mg Oral Q4H PRN       Signed:  Bulmaro Nugent MD    Part of this note may have been written by using a voice dictation software. The note has been proof read but may still contain some grammatical/other typographical errors.

## 2022-06-18 LAB
ERYTHROCYTE [DISTWIDTH] IN BLOOD BY AUTOMATED COUNT: 13.2 % (ref 11.9–14.6)
HCT VFR BLD AUTO: 22.3 % (ref 35.8–46.3)
HGB BLD-MCNC: 7.3 G/DL (ref 11.7–15.4)
MCH RBC QN AUTO: 30.5 PG (ref 26.1–32.9)
MCHC RBC AUTO-ENTMCNC: 32.7 G/DL (ref 31.4–35)
MCV RBC AUTO: 93.3 FL (ref 79.6–97.8)
NRBC # BLD: 0 K/UL (ref 0–0.2)
PLATELET # BLD AUTO: 122 K/UL (ref 150–450)
PMV BLD AUTO: 10.6 FL (ref 9.4–12.3)
RBC # BLD AUTO: 2.39 M/UL (ref 4.05–5.2)
SARS-COV-2 RNA RESP QL NAA+PROBE: NOT DETECTED
SOURCE: NORMAL
WBC # BLD AUTO: 12.1 K/UL (ref 4.3–11.1)

## 2022-06-18 PROCEDURE — 6370000000 HC RX 637 (ALT 250 FOR IP): Performed by: ORTHOPAEDIC SURGERY

## 2022-06-18 PROCEDURE — 97535 SELF CARE MNGMENT TRAINING: CPT

## 2022-06-18 PROCEDURE — 6370000000 HC RX 637 (ALT 250 FOR IP): Performed by: INTERNAL MEDICINE

## 2022-06-18 PROCEDURE — 97112 NEUROMUSCULAR REEDUCATION: CPT

## 2022-06-18 PROCEDURE — 2580000003 HC RX 258: Performed by: ORTHOPAEDIC SURGERY

## 2022-06-18 PROCEDURE — 85027 COMPLETE CBC AUTOMATED: CPT

## 2022-06-18 PROCEDURE — 36415 COLL VENOUS BLD VENIPUNCTURE: CPT

## 2022-06-18 PROCEDURE — 1100000000 HC RM PRIVATE

## 2022-06-18 PROCEDURE — 97530 THERAPEUTIC ACTIVITIES: CPT

## 2022-06-18 RX ADMIN — OXYCODONE HYDROCHLORIDE AND ACETAMINOPHEN 500 MG: 500 TABLET ORAL at 10:03

## 2022-06-18 RX ADMIN — OXYCODONE 5 MG: 5 TABLET ORAL at 00:58

## 2022-06-18 RX ADMIN — ASPIRIN 325 MG: 325 TABLET, COATED ORAL at 10:03

## 2022-06-18 RX ADMIN — ATORVASTATIN CALCIUM 10 MG: 10 TABLET, FILM COATED ORAL at 23:59

## 2022-06-18 RX ADMIN — ISOSORBIDE MONONITRATE 60 MG: 60 TABLET, EXTENDED RELEASE ORAL at 10:03

## 2022-06-18 RX ADMIN — SODIUM CHLORIDE, PRESERVATIVE FREE 10 ML: 5 INJECTION INTRAVENOUS at 10:03

## 2022-06-18 RX ADMIN — PANTOPRAZOLE SODIUM 40 MG: 40 TABLET, DELAYED RELEASE ORAL at 06:00

## 2022-06-18 RX ADMIN — CLONAZEPAM 0.5 MG: 0.5 TABLET ORAL at 23:59

## 2022-06-18 RX ADMIN — SODIUM CHLORIDE, PRESERVATIVE FREE 10 ML: 5 INJECTION INTRAVENOUS at 23:59

## 2022-06-18 RX ADMIN — OXYCODONE 5 MG: 5 TABLET ORAL at 10:06

## 2022-06-18 ASSESSMENT — PAIN DESCRIPTION - FREQUENCY
FREQUENCY: CONTINUOUS
FREQUENCY: CONTINUOUS

## 2022-06-18 ASSESSMENT — PAIN SCALES - GENERAL
PAINLEVEL_OUTOF10: 0
PAINLEVEL_OUTOF10: 0
PAINLEVEL_OUTOF10: 6
PAINLEVEL_OUTOF10: 0
PAINLEVEL_OUTOF10: 5
PAINLEVEL_OUTOF10: 0
PAINLEVEL_OUTOF10: 0

## 2022-06-18 ASSESSMENT — PAIN DESCRIPTION - DESCRIPTORS
DESCRIPTORS: ACHING
DESCRIPTORS: ACHING

## 2022-06-18 ASSESSMENT — PAIN DESCRIPTION - PAIN TYPE
TYPE: SURGICAL PAIN
TYPE: ACUTE PAIN

## 2022-06-18 ASSESSMENT — PAIN DESCRIPTION - ONSET
ONSET: ON-GOING
ONSET: GRADUAL

## 2022-06-18 ASSESSMENT — PAIN DESCRIPTION - ORIENTATION
ORIENTATION: RIGHT
ORIENTATION: LEFT

## 2022-06-18 ASSESSMENT — PAIN - FUNCTIONAL ASSESSMENT: PAIN_FUNCTIONAL_ASSESSMENT: PREVENTS OR INTERFERES SOME ACTIVE ACTIVITIES AND ADLS

## 2022-06-18 ASSESSMENT — PAIN DESCRIPTION - LOCATION
LOCATION: LEG
LOCATION: HIP

## 2022-06-18 NOTE — PROGRESS NOTES
PHYSICAL THERAPY Daily Note and AM  (Link to Caseload Tracking: PT Visit Days : 2  Time In/Out PT Charge Capture  Rehab Caseload Tracker  Orders      Yolanda Magana is a 80 y.o. female   PRIMARY DIAGNOSIS: Closed displaced fracture of left femoral neck (HCC)  Closed fracture of neck of left femur, initial encounter ([facility] [address] [S72.002A]  Closed displaced fracture of left femoral neck (HCC) [S72.002A]  Procedure(s) (LRB):  LEFT HIP HEMIARTHROPLASTY/ CHOICE (Left)  2 Days Post-Op  Inpatient: Payor: MEDICARE / Plan: MEDICARE PART A AND B / Product Type: *No Product type* /     ASSESSMENT:     REHAB RECOMMENDATIONS:   Recommendation to date pending progress:  Setting:   Short-term Rehab    Equipment:     To Be Determined     ASSESSMENT:  Ms. Sinan Winter was supine. Sat to EOB with max a x 2. She was able to stand and transfer to chair with mod a x 2. Once in chair. She worked on sitting balance and performed B LE ex. SUBJECTIVE:   Ms. Sinan Winter states \"I don't hurt when I'm still\".      Social/Functional Lives With: Daughter  Type of Home: Mobile home  Home Layout: One level  Home Access: Stairs to enter with rails  Entrance Stairs - Number of Steps: 6  Home Equipment: Frances Hernandez Help From: Family  ADL Assistance: Independent  Homemaking Assistance: Independent  Ambulation Assistance: Independent  Transfer Assistance: Independent  Active : Yes  Mode of Transportation: Car  Occupation: Retired  OBJECTIVE:     PAIN: VITALS / O2: PRECAUTION / Mario Vilchis / Saida Book:   Pre Treatment:   Pain Level: 0      Post Treatment: 0 Vitals     1088/56 once in chair  Oxygen    Haas Catheter    RESTRICTIONS/PRECAUTIONS:  Restrictions/Precautions  Restrictions/Precautions: Fall Risk  Required Braces or Orthoses?: No  Position Activity Restriction  Hip Precautions: No hip flexion > 90 degrees,No ABduction,No hip internal rotation  Restrictions/Precautions: Fall Risk  Required Braces or Orthoses?: No     MOBILITY: I Mod I S SBA CGA Min Mod Max Total  NT x2 Comments:   Bed Mobility    Rolling [] [] [] [] [] [] [] [] [] [x] []    Supine to Sit [] [] [] [] [] [] [] [x] [] [] [x]    Scooting [] [] [] [] [] [] [] [x] [] [] [x]    Sit to Supine [] [] [] [] [] [] [] [] [] [x] []    Transfers    Sit to Stand [] [] [] [] [] [] [x] [] [] [] [x]    Bed to Chair [] [] [] [] [] [] [x] [] [] [] [x]    Stand to Sit [] [] [] [] [] [] [x] [] [] [] [x]     [] [] [] [] [] [] [] [] [] [] []    I=Independent, Mod I=Modified Independent, S=Supervision, SBA=Standby Assistance, CGA=Contact Guard Assistance,   Min=Minimal Assistance, Mod=Moderate Assistance, Max=Maximal Assistance, Total=Total Assistance, NT=Not Tested    BALANCE: Good Fair+ Fair Fair- Poor NT Comments   Sitting Static [x] [] [] [] [] []    Sitting Dynamic [] [x] [] [] [] []              Standing Static [] [] [x] [] [] []    Standing Dynamic [] [] [] [x] [] []      GAIT: I Mod I S SBA CGA Min Mod Max Total  NT x2 Comments:   Level of Assistance [] [] [] [] [] [] [] [] [] [] []    Distance   feet    DME Rolling Walker    Gait Quality N/A    Weightbearing Status      Stairs      I=Independent, Mod I=Modified Independent, S=Supervision, SBA=Standby Assistance, CGA=Contact Guard Assistance,   Min=Minimal Assistance, Mod=Moderate Assistance, Max=Maximal Assistance, Total=Total Assistance, NT=Not Tested    PLAN:   ACUTE PHYSICAL THERAPY GOALS:   (Developed with and agreed upon by patient and/or caregiver.)    (1.)Ms. Madi Soni will move from supine to sit and sit to supine , scoot up and down and roll side to side in bed with INDEPENDENCE within 7 treatment day(s). (2.)Ms. Madi Soni will transfer from bed to chair and chair to bed with MODIFIED INDEPENDENCE using the least restrictive device within 7 treatment day(s). (3.)Ms. Madi Soni will ambulate with SUPERVISION for a minimum of 100 feet with the least restrictive device within 7 treatment day(s). (4.) Ms. Madi Soni will tolerate 25+ minutes of therapeutic exercise with breaks as needed in order to demonstrate improved activity tolerance within 7 treatment days. FREQUENCY AND DURATION: BID for duration of hospital stay or until stated goals are met, whichever comes first.    TREATMENT:   TREATMENT:   Therapeutic Activity (38 Minutes): Therapeutic activity included Supine to Sit, Scooting, Transfer Training, Sitting balance , Standing balance and LE ex to improve functional Activity tolerance, Balance, Mobility and Strength.     TREATMENT GRID:  N/A    AFTER TREATMENT PRECAUTIONS: Bed/Chair Locked, Chair, Needs within reach and RN notified    INTERDISCIPLINARY COLLABORATION:  RN/ PCT, PT/ PTA and OT/ RIOS    EDUCATION:      TIME IN/OUT:  Time In: 1020  Time Out: 2051 [facility]  Minutes: 1765 Alyx Arias, PTA

## 2022-06-18 NOTE — CONSENT
I have discussed with the patient the rationale for blood component transfusion; its benefits in treating or preventing fatigue, organ damage, or death; and its risk which includes mild transfusion reactions, rare risk of blood borne infection, or more serious but rare reactions. I have discussed the alternatives to transfusion, including the risk and consequences of not receiving transfusion. The patient had an opportunity to ask questions and has agreed to proceed with transfusion of blood components if indicated.     Dipika Enamorado MD

## 2022-06-18 NOTE — PROGRESS NOTES
2022         Post Op day: 2 Days Post-Op   Admit Diagnosis: Closed fracture of neck of left femur, initial encounter ([facility] [address] [S72.002A]  Closed displaced fracture of left femoral neck ([facility] [S72.002A]  LAB:    Recent Results (from the past 24 hour(s))   COVID-19    Collection Time: 22  4:28 PM    Specimen: Nasopharyngeal Swab   Result Value Ref Range    SARS-CoV-2 Please find results under separate order     CBC    Collection Time: 22  3:22 AM   Result Value Ref Range    WBC 12.1 (H) 4.3 - 11.1 K/uL    RBC 2.39 (L) 4.05 - 5.2 M/uL    Hemoglobin 7.3 (L) 11.7 - 15.4 g/dL    Hematocrit 22.3 (L) 35.8 - 46.3 %    MCV 93.3 79.6 - 97.8 FL    MCH 30.5 26.1 - 32.9 PG    MCHC 32.7 31.4 - 35.0 g/dL    RDW 13.2 11.9 - 14.6 %    Platelets 711 (L) 427 - 450 K/uL    MPV 10.6 9.4 - 12.3 FL    nRBC 0.00 0.0 - 0.2 K/uL     Vital Signs:    Patient Vitals for the past 8 hrs:   BP Temp Temp src Pulse Resp SpO2   22 0732 (!) 116/46 98.9 °F (37.2 °C) Oral 91 16 93 %   22 0421 (!) 127/48 99.3 °F (37.4 °C) Oral 99 16 93 %     Temp (24hrs), Av.4 °F (36.9 °C), Min:97.7 °F (36.5 °C), Max:99.3 °F (37.4 °C)    Pain Control:      Subjective: Doing well, pain is well controlled, denies CP, SOB, N/V. No complaints. Objective:  No Acute Distress, Alert and Oriented, left hip dressing is C/D/I. Neurovascular exam is normal       Assessment:   Patient Active Problem List   Diagnosis    Aortic valve sclerosis    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    Hypertension    CAD (coronary artery disease)    Closed displaced fracture of left femoral neck (HCC)       Status Post Procedure(s) (LRB):  LEFT HIP HEMIARTHROPLASTY/ CHOICE (Left)        Plan: Continue PT/OT, Monitor Hgb. ASA/SCDs for DVT prophylaxis. Medical management per hospitalist.  DC dispo: home w/ [facility] vs SNF.      Signed By: DRU Feldman

## 2022-06-18 NOTE — PROGRESS NOTES
Hospitalist Progress Note   Admit Date:  6/15/2022 11:00 AM   Name:  Jaylen Gee   Age:  80 y.o. Sex:  female  :  1933   MRN:  [id number]   Room:  4/    Reason(s) for Admission: Closed fracture of neck of left femur, initial encounter ([facility] [address] [S72.002A]  Closed displaced fracture of left femoral neck [facility]) [facility] [place] Course & Interval History:   Ms. Kathryn Bryan is a very nice 79 y/o WF with a h/o CAD s/p CABG, HTN, breast cancer s/p mastectomy () and CKD who was admitted to our service on 6/15 after she fell at her PCP's office. She was there for a routine follow up visit and is unclear of the details, but fell and landed on her L side. She did not have chest pain, palpitations or lose consciousness. X-ray showed a L femoral neck fracture. Orthopedic Surgery consulted and she underwent L hip hemiarthroplasty on . Subjective/24hr Events (22): Doing well, was up to chair ~6 hours yesterday which she tolerated very well aside from some soreness of buttocks towards the end. Feels good this AM, Hb 7.3, surgical site is clean, no other clinical bleeding noted. Denies chest pain, N/V/D. Assessment & Plan:   # Acute on chronic normocytic anemia              - Baseline 10-11, today  it is 7.3 (POD 2)   - She agrees to blood product transfusion if indicated. # L transcervical femoral neck fracture              - S/p L hip alessandra on , ASA for DVT ppx              - Pain control, therapies, needs placement     # CAD s/p CABG              - Statin     # H/o breast cancer              - S/p mastectomy,      # HTN              - Con't home medications     # GERD              - PPI     # CKD3              - At baseline. Discharge Planning: STR likely Monday at the earliest.  Diet:  ADULT DIET;  Regular  DVT PPx: ASA  Code status: Full Code    Hospital Problems           Last Modified POA    * (Principal) Closed displaced fracture of left femoral neck (HCC) 6/15/2022 Yes    Aortic valve sclerosis 6/16/2022 Yes    Overview Signed 3/18/2022 11:21 PM by Zeke Mckay     Feb 2019 - normal SF and DF, AV sclerosis without stenosis. 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 6/16/2022 Yes    Hypertension 6/15/2022 Yes    CAD (coronary artery disease) 6/15/2022 Yes          Objective:     Patient Vitals for the past 24 hrs:   Temp Pulse Resp BP SpO2   06/18/22 0732 98.9 °F (37.2 °C) 91 16 (!) 116/46 93 %   06/18/22 0421 99.3 °F (37.4 °C) 99 16 (!) 127/48 93 %   06/18/22 0058 -- -- 18 -- --   06/18/22 0019 98.6 °F (37 °C) 93 16 (!) 142/52 91 %   06/17/22 2006 98.2 °F (36.8 °C) 81 20 (!) 125/49 91 %   06/17/22 1615 -- -- -- 96/61 --   06/17/22 1512 97.7 °F (36.5 °C) 68 16 (!) 100/44 92 %   06/17/22 1203 97.9 °F (36.6 °C) 75 14 (!) 105/45 90 %       Estimated body mass index is 27.02 kg/m² as calculated from the following:    Height as of this encounter: 5' 1\" (1.549 m). Weight as of this encounter: 143 lb (64.9 kg). Intake/Output Summary (Last 24 hours) at 6/18/2022 1031  Last data filed at 6/17/2022 1743  Gross per 24 hour   Intake 477 ml   Output --   Net 477 ml         Physical Exam:   Blood pressure (!) 116/46, pulse 91, temperature 98.9 °F (37.2 °C), temperature source Oral, resp. rate 16, height 5' 1\" (1.549 m), weight 143 lb (64.9 kg), SpO2 93 %. General:    Well nourished. No overt distress. Head:  Normocephalic, atraumatic  Eyes:  Sclerae appear normal. Pupils equally round. ENT:  Nares appear normal, no drainage. Moist oral mucosa  Neck:  No restricted ROM. Trachea midline. CV:   RRR. No m/r/g. No jugular venous distension. Lungs:   CTAB. No wheezing, rhonchi, or rales. Respirations even, unlabored. Abdomen: Bowel sounds present. Soft, nontender, nondistended. Extremities: No cyanosis or clubbing. No edema.  Surgical dressing is clean and dry, visible portions of the LLE are unremarkable and w/o fluctuance or crepitus. Skin:     No rashes and normal coloration. Warm and dry. Neuro:  CN II-XII grossly intact. Sensation intact. A&Ox3  Psych:  Normal mood and affect. I have reviewed ordered lab tests and independently visualized imaging below:    Recent Labs:  Recent Results (from the past 48 hour(s))   PLEASE READ & DOCUMENT PPD TEST IN 24 HRS    Collection Time: 06/16/22  4:18 PM   Result Value Ref Range    PPD, (POC) Negative Negative    mm Induration 0 0 - 5 mm   PLEASE READ & DOCUMENT PPD TEST IN 48 HRS    Collection Time: 06/17/22 12:00 AM   Result Value Ref Range    PPD, (POC) Negative Negative    mm Induration 0 0 - 5 mm   CBC    Collection Time: 06/17/22  4:12 AM   Result Value Ref Range    WBC 11.6 (H) 4.3 - 11.1 K/uL    RBC 2.76 (L) 4.05 - 5.2 M/uL    Hemoglobin 8.4 (L) 11.7 - 15.4 g/dL    Hematocrit 25.7 (L) 35.8 - 46.3 %    MCV 93.1 79.6 - 97.8 FL    MCH 30.4 26.1 - 32.9 PG    MCHC 32.7 31.4 - 35.0 g/dL    RDW 13.1 11.9 - 14.6 %    Platelets 282 (L) 348 - 450 K/uL    MPV 10.3 9.4 - 12.3 FL    nRBC 0.00 0.0 - 0.2 K/uL   COVID-19    Collection Time: 06/17/22  4:28 PM    Specimen: Nasopharyngeal Swab   Result Value Ref Range    SARS-CoV-2 Please find results under separate order     CBC    Collection Time: 06/18/22  3:22 AM   Result Value Ref Range    WBC 12.1 (H) 4.3 - 11.1 K/uL    RBC 2.39 (L) 4.05 - 5.2 M/uL    Hemoglobin 7.3 (L) 11.7 - 15.4 g/dL    Hematocrit 22.3 (L) 35.8 - 46.3 %    MCV 93.3 79.6 - 97.8 FL    MCH 30.5 26.1 - 32.9 PG    MCHC 32.7 31.4 - 35.0 g/dL    RDW 13.2 11.9 - 14.6 %    Platelets 914 (L) 033 - 450 K/uL    MPV 10.6 9.4 - 12.3 FL    nRBC 0.00 0.0 - 0.2 K/uL         Other Studies:  XR HIP 2-3 VW W PELVIS LEFT    Result Date: 6/16/2022  Exam: XR HIP 2-3 VW W PELVIS LEFT on 6/16/2022 4:12 PM Clinical History: The Female patient is 80years old  presenting for hip replacement. Comparison:  none Findings: 3 views of the pelvis and left hip were obtained.  No fracture or dislocation is identified. A bipolar  left hip prosthesis is in place with anatomic alignment and positioning demonstrated. There are overlying surgical staples with postoperative changes in the subcutaneous tissues     1. Bipolar left hip prosthesis. Current Meds:  Current Facility-Administered Medications   Medication Dose Route Frequency    HYDROmorphone HCl PF (DILAUDID) injection 0.5 mg  0.5 mg IntraVENous Q4H PRN    sodium chloride flush 0.9 % injection 5-40 mL  5-40 mL IntraVENous 2 times per day    sodium chloride flush 0.9 % injection 5-40 mL  5-40 mL IntraVENous PRN    0.9 % sodium chloride infusion   IntraVENous PRN    ondansetron (ZOFRAN-ODT) disintegrating tablet 4 mg  4 mg Oral Q8H PRN    Or    ondansetron (ZOFRAN) injection 4 mg  4 mg IntraVENous Q6H PRN    aspirin EC tablet 325 mg  325 mg Oral Daily    atorvastatin (LIPITOR) tablet 10 mg  10 mg Oral Nightly    amLODIPine (NORVASC) tablet 10 mg  10 mg Oral Daily    ascorbic acid (VITAMIN C) tablet 500 mg  500 mg Oral Daily    pantoprazole (PROTONIX) tablet 40 mg  40 mg Oral QAM AC    isosorbide mononitrate (IMDUR) extended release tablet 60 mg  60 mg Oral Daily    metoprolol succinate (TOPROL XL) extended release tablet 25 mg  25 mg Oral Daily    clonazePAM (KLONOPIN) tablet 0.5 mg  0.5 mg Oral Nightly    polyethylene glycol (GLYCOLAX) packet 17 g  17 g Oral Daily PRN    acetaminophen (TYLENOL) tablet 650 mg  650 mg Oral Q6H PRN    Or    acetaminophen (TYLENOL) suppository 650 mg  650 mg Rectal Q6H PRN    oxyCODONE (ROXICODONE) immediate release tablet 5 mg  5 mg Oral Q4H PRN       Signed:  Jimmy Grover MD    Part of this note may have been written by using a voice dictation software. The note has been proof read but may still contain some grammatical/other typographical errors.

## 2022-06-18 NOTE — PROGRESS NOTES
OCCUPATIONAL THERAPY Daily Note and AM     OT Visit Days: 2   Time  OT Charge Capture  Rehab Caseload Tracker  OT Orders    Jorge Castillo is a 80 y.o. female   PRIMARY DIAGNOSIS: Closed displaced fracture of left femoral neck (HCC)  Closed fracture of neck of left femur, initial encounter ([facility] [address] [S72.002A]  Closed displaced fracture of left femoral neck (HCC) [S72.002A]  Procedure(s) (LRB):  LEFT HIP HEMIARTHROPLASTY/ CHOICE (Left)  2 Days Post-Op  Inpatient: Payor: Mari Castaneda / Plan: MEDICARE PART A AND B / Product Type: *No Product type* /     ASSESSMENT:     REHAB RECOMMENDATIONS: CURRENT LEVEL OF FUNCTION:  (Most Recently Demonstrated)   Recommendation to date pending progress:  Setting:   Short-term Rehab    Equipment:     To Be Determined Bathing:   Not Tested  Dressing:   Not Tested  Feeding/Grooming:   Not Tested  Toileting:   Not Tested  Functional Mobility:   Moderate Assist/Max A x2     ASSESSMENT:  Ms. Jesica Graves is doing fair today. Pt presents supine upon arrival. Pt required max A x2 for bed mobility today. Pt demonstrates fair sitting balance at EOB. Pt was able to stand with mod A x2 and transfer over to chair. Pt introduced to and educated on uses of sock aid and reacher. Pt verbalized understanding. Pt left in chair with all needs in reach. Making progress with goals. Will continue to benefit from skilled OT during stay.        SUBJECTIVE:     Ms. Jesica Graves states, \"I just don't feel right\"     Social/Functional Lives With: Daughter  Type of Home: Mobile home  Home Layout: One level  Home Access: Stairs to enter with rails  Entrance Stairs - Number of Steps: 6  Home Equipment: Tara Mary Help From: Family  ADL Assistance: Independent  Homemaking Assistance: Independent  Ambulation Assistance: Independent  Transfer Assistance: Independent  Active : Yes  Mode of Transportation: Car  Occupation: Retired    OBJECTIVE:     Jefry Pratt / Rut Dumont / Marshall Slot: NA    RESTRICTIONS/PRECAUTIONS:  Restrictions/Precautions  Restrictions/Precautions: Fall Risk  Required Braces or Orthoses?: No  Position Activity Restriction  Hip Precautions: No hip flexion > 90 degrees,No ABduction,No hip internal rotation        PAIN: VITALS / O2:   Pre Treatment:              Post Treatment: 0 Vitals          Oxygen            MOBILITY: I Mod I S SBA CGA Min Mod Max Total  NT x2 Comments:   Bed Mobility    Rolling [] [] [] [] [] [] [] [] [] [] []    Supine to Sit [] [] [] [] [] [] [] [x] [] [] [x]    Scooting [] [] [] [] [] [] [x] [] [] [] [x]    Sit to Supine [] [] [] [] [] [] [] [] [] [x] []    Transfers    Sit to Stand [] [] [] [] [] [] [x] [] [] [] [x]    Bed to Chair [] [] [] [] [] [] [x] [] [] [] [x]    Stand to Sit [] [] [] [] [] [x] [] [] [] [] []    Tub/Shower [] [] [] [] [] [] [] [] [] [x] []     Toilet [] [] [] [] [] [] [] [] [] [x] []      [] [] [] [] [] [] [] [] [] [] []    I=Independent, Mod I=Modified Independent, S=Supervision/Setup, SBA=Standby Assistance, CGA=Contact Guard Assistance, Min=Minimal Assistance, Mod=Moderate Assistance, Max=Maximal Assistance, Total=Total Assistance, NT=Not Tested    ACTIVITIES OF DAILY LIVING: I Mod I S SBA CGA Min Mod Max Total NT Comments   BASIC ADLs:              Upper Body   Bathing [] [] [] [] [] [] [] [] [] [x]    Lower Body Bathing [] [] [] [] [] [] [] [] [] [x]    Toileting [] [] [] [] [] [] [] [] [] [x]    Upper Body Dressing [] [] [] [] [] [] [] [] [] [x]    Lower Body Dressing [] [] [] [] [] [] [] [] [] [x]    Feeding [] [] [] [] [] [] [] [] [] [x]    Grooming [] [] [] [] [] [] [] [] [] [x]    Personal Device Care [] [] [] [] [] [] [] [] [] [x]    Functional Mobility [] [] [] [] [] [] [] [x] [] [] x2   I=Independent, Mod I=Modified Independent, S=Supervision/Setup, SBA=Standby Assistance, CGA=Contact Guard Assistance, Min=Minimal Assistance, Mod=Moderate Assistance, Max=Maximal Assistance, Total=Total Assistance, NT=Not Tested    BALANCE: Good Fair+ Fair Fair- Poor NT Comments   Sitting Static [] [] [x] [] [] []    Sitting Dynamic [] [] [x] [] [] []              Standing Static [] [] [] [x] [] []    Standing Dynamic [] [] [] [x] [] []        PLAN:     FREQUENCY/DURATION   OT Plan of Care: 5 times/week for duration of hospital stay or until stated goals are met, whichever comes first.    ACUTE OCCUPATIONAL THERAPY GOALS:   (Developed with and agreed upon by patient and/or caregiver.)  1. Patient will complete lower body bathing and dressing with MIN A and adaptive equipment as needed. 2.Patient will complete upper body bathing and dressing with MOD I and adaptive equipment as needed. 3. Patient will complete toileting with MIN A.   4. Patient will tolerate 25 minutes of OT treatment with 1-2 rest breaks to increase activity tolerance for ADLs. 5. Patient will complete functional transfers with CGA and adaptive equipment as needed. 6. Patient will complete functional activity with MOD I and adaptive equipment as needed.     Timeframe  TREATMENT:     TREATMENT:   Co-Treatment PT/OT necessary due to patient's decreased overall endurance/tolerance levels, as well as need for high level skilled assistance to complete functional transfers/mobility and functional tasks  Neuromuscular Re-education (25 Minutes): Neuromuscular Re-education included Balance Training, Coordination training, Postural training, Sitting balance training and Standing balance training to improve Balance, Coordination, Functional Mobility and Postural Control. Self Care (13 minutes): Patient participated in lower body dressing and grooming ADLs in supported sitting with minimal visual and verbal cueing to increase independence and decrease assistance required. Patient also participated in functional transfer and adaptive equipment training to increase independence and decrease assistance required.      TREATMENT GRID:    AFTER TREATMENT PRECAUTIONS: Bed/Chair Locked, Call light within reach, Chair and Needs within reach    INTERDISCIPLINARY COLLABORATION:  RN/ PCT, PT/ PTA and OT/ RIOS    EDUCATION:       TOTAL TREATMENT DURATION AND TIME:  Time In: 1020  Time Out: [address]  Minutes: 38    Jaiden Garcia SHAMIKA

## 2022-06-18 NOTE — PROGRESS NOTES
PHYSICAL THERAPY Daily Note and AM  (Link to Caseload Tracking: PT Visit Days : 2  Time In/Out PT Charge Capture  Rehab Caseload Tracker  Orders      Melly Hadley is a 80 y.o. female   PRIMARY DIAGNOSIS: Closed displaced fracture of left femoral neck (HCC)  [facility] [S72.002A]  Closed displaced fracture of left femoral neck (HCC) [S72.002A]  Procedure(s) (LRB):  LEFT HIP HEMIARTHROPLASTY/ CHOICE (Left)  2 Days Post-Op  Inpatient: Payor: MEDICARE / Plan: MEDICARE PART A AND B / Product Type: *No Product type* /     ASSESSMENT:     REHAB RECOMMENDATIONS:   Recommendation to date pending progress:  Setting:   Short-term Rehab    Equipment:     To Be Determined     ASSESSMENT:  Ms. Radha Atkinson was in chair from AM treatment. She stood and slowly took steps to get to EOB about 4' with mod a x 2 and repeated VC's. EOB to supine with total a/       SUBJECTIVE:   Ms. Radha Atkinson states \"I'm tired\".      Social/Functional Lives With: Daughter  Type of Home: Mobile home  Home Layout: One level  Home Access: Stairs to enter with rails  Entrance Stairs - Number of Steps: 6  Home Equipment: Iver Yovana Help From: Family  ADL Assistance: Independent  Homemaking Assistance: Independent  Ambulation Assistance: Independent  Transfer Assistance: Independent  Active : Yes  Mode of Transportation: Car  Occupation: Retired  OBJECTIVE:     PAIN: VITALS / O2: PRECAUTION / Manito Trisha / Melinda Hun:   Pre Treatment:   Pain Level: 0      Post Treatment: 0 Vitals       Oxygen    None    RESTRICTIONS/PRECAUTIONS:  Restrictions/Precautions  Restrictions/Precautions: Fall Risk  Required Braces or Orthoses?: No  Position Activity Restriction  Hip Precautions: No hip flexion > 90 degrees,No ABduction,No hip internal rotation  Restrictions/Precautions: Fall Risk  Required Braces or Orthoses?: No     MOBILITY: I Mod I S SBA CGA Min Mod Max Total  NT x2 Comments:   Bed Mobility    Rolling [] [] [] [] [] [] [] [] [] [x] []    Supine to Sit [] [] [] [] [] [] [] [] [] [x] []    Scooting [] [] [] [] [] [] [] [] [] [x] []    Sit to Supine [] [] [] [] [] [] [] [] [x] [] [x]    Transfers    Sit to Stand [] [] [] [] [] [] [x] [] [] [] [x]    Bed to Chair [] [] [] [] [] [] [x] [] [] [] [x]    Stand to Sit [] [] [] [] [] [] [x] [] [] [] [x]     [] [] [] [] [] [] [] [] [] [] []    I=Independent, Mod I=Modified Independent, S=Supervision, SBA=Standby Assistance, CGA=Contact Guard Assistance,   Min=Minimal Assistance, Mod=Moderate Assistance, Max=Maximal Assistance, Total=Total Assistance, NT=Not Tested    BALANCE: Good Fair+ Fair Fair- Poor NT Comments   Sitting Static [x] [] [] [] [] []    Sitting Dynamic [] [x] [] [] [] []              Standing Static [] [] [x] [] [] []    Standing Dynamic [] [] [] [x] [] []      GAIT: I Mod I S SBA CGA Min Mod Max Total  NT x2 Comments:   Level of Assistance [] [] [] [] [] [] [] [] [] [] []    Distance 4 feet    DME Rolling Walker    Gait Quality N/A    Weightbearing Status      Stairs      I=Independent, Mod I=Modified Independent, S=Supervision, SBA=Standby Assistance, CGA=Contact Guard Assistance,   Min=Minimal Assistance, Mod=Moderate Assistance, Max=Maximal Assistance, Total=Total Assistance, NT=Not Tested    PLAN:   ACUTE PHYSICAL THERAPY GOALS:   (Developed with and agreed upon by patient and/or caregiver.)    (1.)Ms. Shahid Díaz will move from supine to sit and sit to supine , scoot up and down and roll side to side in bed with INDEPENDENCE within 7 treatment day(s). (2.)Ms. Shahid Díaz will transfer from bed to chair and chair to bed with MODIFIED INDEPENDENCE using the least restrictive device within 7 treatment day(s). (3.)Ms. Shahid Díaz will ambulate with SUPERVISION for a minimum of 100 feet with the least restrictive device within 7 treatment day(s). (4.) Ms. Shahid Díaz will tolerate 25+ minutes of therapeutic exercise with breaks as needed in order to demonstrate improved activity tolerance within 7 treatment days. FREQUENCY AND DURATION: BID for duration of hospital stay or until stated goals are met, whichever comes first.    TREATMENT:   TREATMENT:   Therapeutic Activity (10 Minutes): Therapeutic activity included Sit to Supine, Transfer Training, Sitting balance  and Standing balance to improve functional Activity tolerance, Balance, Mobility and Strength.     TREATMENT GRID:  N/A    AFTER TREATMENT PRECAUTIONS: Bed/Chair Locked, Chair, Needs within reach and RN notified    INTERDISCIPLINARY COLLABORATION:  RN/ PCT, PT/ PTA and OT/ RIOS    EDUCATION:      TIME IN/OUT:  Time In: 1405  Time Out: 1415  Minutes: [address] [facility], [facility]

## 2022-06-19 PROBLEM — D69.6 THROMBOCYTOPENIA (HCC): Status: ACTIVE | Noted: 2022-06-19

## 2022-06-19 LAB
ERYTHROCYTE [DISTWIDTH] IN BLOOD BY AUTOMATED COUNT: 13.4 % (ref 11.9–14.6)
HCT VFR BLD AUTO: 21.9 % (ref 35.8–46.3)
HGB BLD-MCNC: 7.1 G/DL (ref 11.7–15.4)
MCH RBC QN AUTO: 30.6 PG (ref 26.1–32.9)
MCHC RBC AUTO-ENTMCNC: 32.4 G/DL (ref 31.4–35)
MCV RBC AUTO: 94.4 FL (ref 79.6–97.8)
NRBC # BLD: 0 K/UL (ref 0–0.2)
PLATELET # BLD AUTO: 149 K/UL (ref 150–450)
PMV BLD AUTO: 10.2 FL (ref 9.4–12.3)
RBC # BLD AUTO: 2.32 M/UL (ref 4.05–5.2)
WBC # BLD AUTO: 11.8 K/UL (ref 4.3–11.1)

## 2022-06-19 PROCEDURE — 6370000000 HC RX 637 (ALT 250 FOR IP): Performed by: INTERNAL MEDICINE

## 2022-06-19 PROCEDURE — 97112 NEUROMUSCULAR REEDUCATION: CPT

## 2022-06-19 PROCEDURE — 1100000000 HC RM PRIVATE

## 2022-06-19 PROCEDURE — 6370000000 HC RX 637 (ALT 250 FOR IP): Performed by: ORTHOPAEDIC SURGERY

## 2022-06-19 PROCEDURE — 2580000003 HC RX 258: Performed by: ORTHOPAEDIC SURGERY

## 2022-06-19 PROCEDURE — 36415 COLL VENOUS BLD VENIPUNCTURE: CPT

## 2022-06-19 PROCEDURE — 97530 THERAPEUTIC ACTIVITIES: CPT

## 2022-06-19 PROCEDURE — 85027 COMPLETE CBC AUTOMATED: CPT

## 2022-06-19 PROCEDURE — 97535 SELF CARE MNGMENT TRAINING: CPT

## 2022-06-19 RX ADMIN — ISOSORBIDE MONONITRATE 60 MG: 60 TABLET, EXTENDED RELEASE ORAL at 09:27

## 2022-06-19 RX ADMIN — OXYCODONE HYDROCHLORIDE AND ACETAMINOPHEN 500 MG: 500 TABLET ORAL at 09:27

## 2022-06-19 RX ADMIN — SODIUM CHLORIDE, PRESERVATIVE FREE 10 ML: 5 INJECTION INTRAVENOUS at 20:44

## 2022-06-19 RX ADMIN — CLONAZEPAM 0.5 MG: 0.5 TABLET ORAL at 20:43

## 2022-06-19 RX ADMIN — PANTOPRAZOLE SODIUM 40 MG: 40 TABLET, DELAYED RELEASE ORAL at 07:22

## 2022-06-19 RX ADMIN — ASPIRIN 325 MG: 325 TABLET, COATED ORAL at 09:27

## 2022-06-19 RX ADMIN — METOPROLOL SUCCINATE 25 MG: 50 TABLET, EXTENDED RELEASE ORAL at 09:27

## 2022-06-19 RX ADMIN — ATORVASTATIN CALCIUM 10 MG: 10 TABLET, FILM COATED ORAL at 20:43

## 2022-06-19 RX ADMIN — AMLODIPINE BESYLATE 10 MG: 10 TABLET ORAL at 09:28

## 2022-06-19 RX ADMIN — OXYCODONE 5 MG: 5 TABLET ORAL at 20:42

## 2022-06-19 ASSESSMENT — PAIN DESCRIPTION - ORIENTATION: ORIENTATION: LEFT

## 2022-06-19 ASSESSMENT — PAIN SCALES - GENERAL
PAINLEVEL_OUTOF10: 2
PAINLEVEL_OUTOF10: 0
PAINLEVEL_OUTOF10: 5

## 2022-06-19 ASSESSMENT — PAIN DESCRIPTION - LOCATION: LOCATION: LEG

## 2022-06-19 ASSESSMENT — PAIN DESCRIPTION - DESCRIPTORS: DESCRIPTORS: ACHING

## 2022-06-19 ASSESSMENT — PAIN - FUNCTIONAL ASSESSMENT: PAIN_FUNCTIONAL_ASSESSMENT: PREVENTS OR INTERFERES SOME ACTIVE ACTIVITIES AND ADLS

## 2022-06-19 NOTE — PROGRESS NOTES
Hospitalist Progress Note   Admit Date:  6/15/2022 11:00 AM   Name:  Pradeep Miller   Age:  80 y.o. Sex:  female  :  1933   MRN:  [id number]   Room:  4/    Reason(s) for Admission: Closed fracture of neck of left femur, initial encounter ([facility] [address] [S72.002A]  Closed displaced fracture of left femoral neck [facility]) [facility] Course & Interval History:   Ms. Angela Chandra is a very nice 79 y/o WF with a h/o CAD s/p CABG, HTN, breast cancer s/p mastectomy () and CKD who was admitted to our service on 6/15 after she fell at her PCP's office. She was there for a routine follow up visit and is unclear of the details, but fell and landed on her L side. She did not have chest pain, palpitations or lose consciousness. X-ray showed a L femoral neck fracture. Orthopedic Surgery consulted and she underwent L hip hemiarthroplasty on .     Subjective/24hr Events (22):  Up to chair, eating lunch, doing ok, does get pain in butt with being in the chair for a long time but otherwise no chest pain or N/V/D. Bruising around surgical area but no drainage or bleeding. Hb 7.1. Assessment & Plan:   # Acute on chronic normocytic anemia, post-operative drop              - Baseline 10-11   - Stable today  at 7.1 from 7.3, will recheck tomorrow   - She agrees to blood product transfusion if indicated.     # L transcervical femoral neck fracture              - S/p L hip alessandra on , ASA for DVT ppx              - Pain control, therapies, placement    # Thrombocytopenia   - Improved today, likely from acute illness. CBC tomorrow . # CAD s/p CABG              - Statin     # H/o breast cancer              - S/p mastectomy,      # HTN              - Con't home medications     # GERD              - PPI     # CKD3              - At baseline. Discharge Planning: [facility] hopefully tomorrow. Diet:  ADULT DIET;  Regular  DVT PPx: ASA  Code status: Full Code    Hospital Problems           Last Modified POA    * (Principal) Closed displaced fracture of left femoral neck ([address] 6/15/2022 Yes    Aortic valve sclerosis 6/16/2022 Yes    Overview Signed 3/18/2022 11:21 PM by Zeke Mckay     Feb 2019 - normal SF and DF, AV sclerosis without stenosis. 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 6/16/2022 Yes    Hypertension 6/15/2022 Yes    CAD (coronary artery disease) 6/15/2022 Yes          Objective:     Patient Vitals for the past 24 hrs:   Temp Pulse Resp BP SpO2   06/19/22 0800 97.8 °F (36.6 °C) 94 19 (!) 127/48 91 %   06/19/22 0435 99.1 °F (37.3 °C) 87 20 (!) 125/52 91 %   06/19/22 0023 100 °F (37.8 °C) 93 18 (!) 126/49 94 %   06/18/22 2029 98.4 °F (36.9 °C) 91 16 131/63 94 %   06/18/22 1618 97.9 °F (36.6 °C) 84 18 (!) 119/59 94 %       Estimated body mass index is 27.02 kg/m² as calculated from the following:    Height as of this encounter: 5' 1\" (1.549 m). Weight as of this encounter: 143 lb (64.9 kg). No intake or output data in the 24 hours ending 06/19/22 1221      Physical Exam:   Blood pressure (!) 127/48, pulse 94, temperature 97.8 °F (36.6 °C), temperature source Oral, resp. rate 19, height 5' 1\" (1.549 m), weight 143 lb (64.9 kg), SpO2 91 %. General:    Well nourished. No overt distress. Head:  Normocephalic, atraumatic  Eyes:  Sclerae appear normal. Pupils equally round. ENT:  Nares appear normal, no drainage. Moist oral mucosa  Neck:  No restricted ROM. Trachea midline. CV:   RRR. No m/r/g. No jugular venous distension. Lungs:   CTAB. No wheezing, rhonchi, or rales. Respirations even, unlabored. Abdomen: Bowel sounds present. Soft, nontender, nondistended. Extremities: No cyanosis or clubbing. Edema to LLE/hip, surgical dressing with very small amount of dried blood, surrounding skin with some bruising but no further drainage or erythema. Skin:     No rashes and normal coloration. Warm and dry. Neuro:  CN II-XII grossly intact.  Sensation intact. A&Ox3  Psych:  Normal mood and affect. I have reviewed ordered lab tests and independently visualized imaging below:    Recent Labs:  Recent Results (from the past 48 hour(s))   COVID-19    Collection Time: 06/17/22  4:28 PM    Specimen: Nasopharyngeal Swab   Result Value Ref Range    SARS-CoV-2 Please find results under separate order     COVID-19    Collection Time: 06/17/22  4:28 PM    Specimen: Nasopharyngeal   Result Value Ref Range    Source Nasopharyngeal      SARS-CoV-2, PCR Not detected NOTD     CBC    Collection Time: 06/18/22  3:22 AM   Result Value Ref Range    WBC 12.1 (H) 4.3 - 11.1 K/uL    RBC 2.39 (L) 4.05 - 5.2 M/uL    Hemoglobin 7.3 (L) 11.7 - 15.4 g/dL    Hematocrit 22.3 (L) 35.8 - 46.3 %    MCV 93.3 79.6 - 97.8 FL    MCH 30.5 26.1 - 32.9 PG    MCHC 32.7 31.4 - 35.0 g/dL    RDW 13.2 11.9 - 14.6 %    Platelets 160 (L) 049 - 450 K/uL    MPV 10.6 9.4 - 12.3 FL    nRBC 0.00 0.0 - 0.2 K/uL   CBC    Collection Time: 06/19/22 11:30 AM   Result Value Ref Range    WBC 11.8 (H) 4.3 - 11.1 K/uL    RBC 2.32 (L) 4.05 - 5.2 M/uL    Hemoglobin 7.1 (L) 11.7 - 15.4 g/dL    Hematocrit 21.9 (L) 35.8 - 46.3 %    MCV 94.4 79.6 - 97.8 FL    MCH 30.6 26.1 - 32.9 PG    MCHC 32.4 31.4 - 35.0 g/dL    RDW 13.4 11.9 - 14.6 %    Platelets 914 (L) 539 - 450 K/uL    MPV 10.2 9.4 - 12.3 FL    nRBC 0.00 0.0 - 0.2 K/uL         Other Studies:  No results found.     Current Meds:  Current Facility-Administered Medications   Medication Dose Route Frequency    HYDROmorphone HCl PF (DILAUDID) injection 0.5 mg  0.5 mg IntraVENous Q4H PRN    sodium chloride flush 0.9 % injection 5-40 mL  5-40 mL IntraVENous 2 times per day    sodium chloride flush 0.9 % injection 5-40 mL  5-40 mL IntraVENous PRN    0.9 % sodium chloride infusion   IntraVENous PRN    ondansetron (ZOFRAN-ODT) disintegrating tablet 4 mg  4 mg Oral Q8H PRN    Or    ondansetron (ZOFRAN) injection 4 mg  4 mg IntraVENous Q6H PRN    aspirin EC tablet 325 mg 325 mg Oral Daily    atorvastatin (LIPITOR) tablet 10 mg  10 mg Oral Nightly    amLODIPine (NORVASC) tablet 10 mg  10 mg Oral Daily    ascorbic acid (VITAMIN C) tablet 500 mg  500 mg Oral Daily    pantoprazole (PROTONIX) tablet 40 mg  40 mg Oral QAM AC    isosorbide mononitrate (IMDUR) extended release tablet 60 mg  60 mg Oral Daily    metoprolol succinate (TOPROL XL) extended release tablet 25 mg  25 mg Oral Daily    clonazePAM (KLONOPIN) tablet 0.5 mg  0.5 mg Oral Nightly    polyethylene glycol (GLYCOLAX) packet 17 g  17 g Oral Daily PRN    acetaminophen (TYLENOL) tablet 650 mg  650 mg Oral Q6H PRN    Or    acetaminophen (TYLENOL) suppository 650 mg  650 mg Rectal Q6H PRN    oxyCODONE (ROXICODONE) immediate release tablet 5 mg  5 mg Oral Q4H PRN       Signed:  Johnnie Ness MD    Part of this note may have been written by using a voice dictation software. The note has been proof read but may still contain some grammatical/other typographical errors. Presented with elevated lactate of 2.1 likely secondary to infection.  -will trend to clear.

## 2022-06-19 NOTE — PROGRESS NOTES
PHYSICAL THERAPY Daily Note and AM  (Link to Caseload Tracking: PT Visit Days : 3  Time In/Out PT Charge Capture  Rehab Caseload Tracker  Orders      Naomy Mistry is a 80 y.o. female   PRIMARY DIAGNOSIS: Closed displaced fracture of left femoral neck (HCC)  Closed fracture of neck of left femur, [facility] [S72.002A]  Closed displaced fracture of left femoral neck (HCC) [S72.002A]  Procedure(s) (LRB):  LEFT HIP HEMIARTHROPLASTY/ CHOICE (Left)  3 Days Post-Op  Inpatient: Payor: MEDICARE / Plan: MEDICARE PART A AND B / Product Type: *No Product type* /     ASSESSMENT:     REHAB RECOMMENDATIONS:   Recommendation to date pending progress:  Setting:   Short-term Rehab    Equipment:     To Be Determined     ASSESSMENT:  Ms. Holly Nunes was in bed upon arrival.  Co-treat with OT. Patient transferred to chair with mod/max A of 2. Worked on bed mobility. SUBJECTIVE:   Ms. Holly Nunes required verbal cues for recall of hip precautions.      Social/Functional Lives With: Daughter  Type of Home: Mobile home  Home Layout: One level  Home Access: Stairs to enter with rails  Entrance Stairs - Number of Steps: 6  Home Equipment: Surjit Buddarlene Help From: Family  ADL Assistance: Independent  Homemaking Assistance: Independent  Ambulation Assistance: Independent  Transfer Assistance: Independent  Active : Yes  Mode of Transportation: Car  Occupation: Retired  OBJECTIVE:     PAIN: VITALS / O2: PRECAUTION / Tiffanie Allen / Catarina Batch:   Pre Treatment:          Post Treatment: 0 Vitals       Oxygen    None    RESTRICTIONS/PRECAUTIONS:  Restrictions/Precautions  Restrictions/Precautions: Fall Risk  Required Braces or Orthoses?: No  Position Activity Restriction  Hip Precautions: No hip flexion > 90 degrees,No ABduction,No hip internal rotation  Restrictions/Precautions: Fall Risk  Required Braces or Orthoses?: No     MOBILITY: I Mod I S SBA CGA Min Mod Max Total  NT x2 Comments:   Bed Mobility    Rolling [] [] [] [] [] [] within 7 treatment days. FREQUENCY AND DURATION: BID for duration of hospital stay or until stated goals are met, whichever comes first.    TREATMENT:   TREATMENT:   Co-Treatment PT/OT necessary due to patient's decreased overall endurance/tolerance levels, as well as need for high level skilled assistance to complete functional transfers/mobility and functional tasks  Therapeutic Activity (31 Minutes): Therapeutic activity included Rolling, Supine to Sit, Transfer Training, Sitting balance  and Standing balance to improve functional Activity tolerance, Balance, Mobility and Strength.     TREATMENT GRID:  N/A    AFTER TREATMENT PRECAUTIONS: Bed/Chair Locked, Chair, Needs within reach and RN notified    INTERDISCIPLINARY COLLABORATION:  RN/ PCT, PT/ PTA and OT/ RIOS    EDUCATION:      TIME IN/OUT:  Time In: 8981  Time Out: 5637  Minutes: [address]

## 2022-06-19 NOTE — PROGRESS NOTES
[x] [x] [] []        PLAN:     FREQUENCY/DURATION   OT Plan of Care: 5 times/week for duration of hospital stay or until stated goals are met, whichever comes first.    ACUTE OCCUPATIONAL THERAPY GOALS:   (Developed with and agreed upon by patient and/or caregiver.)  1. Patient will complete lower body bathing and dressing with MIN A and adaptive equipment as needed. 2.Patient will complete upper body bathing and dressing with MOD I and adaptive equipment as needed. 3. Patient will complete toileting with MIN A.   4. Patient will tolerate 25 minutes of OT treatment with 1-2 rest breaks to increase activity tolerance for ADLs. 5. Patient will complete functional transfers with CGA and adaptive equipment as needed. 6. Patient will complete functional activity with MOD I and adaptive equipment as needed.     Timeframe  TREATMENT:     TREATMENT:   Co-Treatment PT/OT necessary due to patient's decreased overall endurance/tolerance levels, as well as need for high level skilled assistance to complete functional transfers/mobility and functional tasks  Neuromuscular Re-education (16 Minutes): Neuromuscular Re-education included Balance Training, Coordination training, Postural training, Sitting balance training and Standing balance training to improve Balance, Coordination, Functional Mobility, Postural Control and trasnsfers and functional mobility. Self Care (15 minutes): Patient participated in upper body dressing, personal device care and grooming ADLs in supported sitting with minimal visual and verbal cueing to increase independence and decrease assistance required. Patient also participated in functional transfer and adaptive equipment training to increase independence and decrease assistance required.      TREATMENT GRID:    AFTER TREATMENT PRECAUTIONS: Bed/Chair Locked, Call light within reach, Chair and Needs within reach    INTERDISCIPLINARY COLLABORATION:  RN/ PCT, PT/ PTA and OT/ RIOS    EDUCATION: TOTAL TREATMENT DURATION AND TIME:  Time In: 3423  Time Out: [address]  Minutes: 4296 [facility], [facility]

## 2022-06-19 NOTE — PROGRESS NOTES
Orthopedic Joint Progress Note    2022  Admit Date: 6/15/2022  Admit Diagnosis: Closed fracture of neck of left femur, initial encounter ([facility] [address] [S72.002A]  Closed displaced fracture of left femoral neck (HCC) [S72.002A]    3 Days Post-Op    Subjective:     Francoise Man  doing well. Pain well-controlled. No new complaints      Review of Systems: Musculoskeletal and general review of systems are unchanged    Objective:     PT/OT:     PATIENT MOBILITY                           Vital Signs:    Blood pressure (!) 127/48, pulse 94, temperature 97.8 °F (36.6 °C), temperature source Oral, resp. rate 19, height 5' 1\" (1.549 m), weight 143 lb (64.9 kg), SpO2 91 %.   Temp (24hrs), Av.8 °F (37.1 °C), Min:97.8 °F (36.6 °C), Max:100 °F (37.8 °C)      Pain Control:        Meds:  Current Facility-Administered Medications   Medication Dose Route Frequency    HYDROmorphone HCl PF (DILAUDID) injection 0.5 mg  0.5 mg IntraVENous Q4H PRN    sodium chloride flush 0.9 % injection 5-40 mL  5-40 mL IntraVENous 2 times per day    sodium chloride flush 0.9 % injection 5-40 mL  5-40 mL IntraVENous PRN    0.9 % sodium chloride infusion   IntraVENous PRN    ondansetron (ZOFRAN-ODT) disintegrating tablet 4 mg  4 mg Oral Q8H PRN    Or    ondansetron (ZOFRAN) injection 4 mg  4 mg IntraVENous Q6H PRN    aspirin EC tablet 325 mg  325 mg Oral Daily    atorvastatin (LIPITOR) tablet 10 mg  10 mg Oral Nightly    amLODIPine (NORVASC) tablet 10 mg  10 mg Oral Daily    ascorbic acid (VITAMIN C) tablet 500 mg  500 mg Oral Daily    pantoprazole (PROTONIX) tablet 40 mg  40 mg Oral QAM AC    isosorbide mononitrate (IMDUR) extended release tablet 60 mg  60 mg Oral Daily    metoprolol succinate (TOPROL XL) extended release tablet 25 mg  25 mg Oral Daily    clonazePAM (KLONOPIN) tablet 0.5 mg  0.5 mg Oral Nightly    polyethylene glycol (GLYCOLAX) packet 17 g  17 g Oral Daily PRN    acetaminophen (TYLENOL) tablet 650 mg  650 mg Oral

## 2022-06-19 NOTE — PROGRESS NOTES
PHYSICAL THERAPY Daily Note and PM  (Link to Caseload Tracking: PT Visit Days : 3  Time In/Out PT Charge Capture  Rehab Caseload Tracker  Orders      Rebecca Mendoza is a 80 y.o. female   PRIMARY DIAGNOSIS: Closed displaced fracture of left femoral neck (HCC)  Closed fracture of neck of left femur, initial encounter ([facility] [address] [S72.002A]  Closed displaced fracture of left femoral neck (HCC) [S72.002A]  Procedure(s) (LRB):  LEFT HIP HEMIARTHROPLASTY/ CHOICE (Left)  3 Days Post-Op  Inpatient: Payor: MEDICARE / Plan: MEDICARE PART A AND B / Product Type: *No Product type* /     ASSESSMENT:     REHAB RECOMMENDATIONS:   Recommendation to date pending progress:  Setting:   Short-term Rehab    Equipment:     To Be Determined     ASSESSMENT:  Ms. Madi Soni ready to go back to bed upon arrival.  Co-treat with OT. Patient transferred back to bed with mod/max A of 2. Worked on bed mobility. She reports being tired. SUBJECTIVE:   Ms. Madi Soni required verbal cues for recall of hip precautions.      Social/Functional Lives With: Daughter  Type of Home: Mobile home  Home Layout: One level  Home Access: Stairs to enter with rails  Entrance Stairs - Number of Steps: 6  Home Equipment: [employer] Help From: Family  ADL Assistance: Independent  Homemaking Assistance: Independent  Ambulation Assistance: Independent  Transfer Assistance: Independent  Active : Yes  Mode of Transportation: Car  Occupation: Retired  OBJECTIVE:     PAIN: VITALS / O2: PRECAUTION / Jose العراقي / Bridgete Second:   Pre Treatment:          Post Treatment: 0 Vitals       Oxygen    None    RESTRICTIONS/PRECAUTIONS:  Restrictions/Precautions  Restrictions/Precautions: Fall Risk  Required Braces or Orthoses?: No  Position Activity Restriction  Hip Precautions: No hip flexion > 90 degrees,No ABduction,No hip internal rotation  Restrictions/Precautions: Fall Risk  Required Braces or Orthoses?: No     MOBILITY: I Mod I S SBA CGA Min Mod Max Total  NT x2 Comments: Bed Mobility    Rolling [] [] [] [] [] [] [x] [] [] [] []    Supine to Sit [] [] [] [] [] [] [] [] [] [x] []    Scooting [] [] [] [] [] [] [x] [] [] [] []    Sit to Supine [] [] [] [] [] [] [x] [] [] [] [x]    Transfers    Sit to Stand [] [] [] [] [] [] [x] [] [] [] [x]    Bed to Chair [] [] [] [] [] [] [x] [] [] [] [x]    Stand to Sit [] [] [] [] [] [] [x] [] [] [] [x]     [] [] [] [] [] [] [] [] [] [] []    I=Independent, Mod I=Modified Independent, S=Supervision, SBA=Standby Assistance, CGA=Contact Guard Assistance,   Min=Minimal Assistance, Mod=Moderate Assistance, Max=Maximal Assistance, Total=Total Assistance, NT=Not Tested    BALANCE: Good Fair+ Fair Fair- Poor NT Comments   Sitting Static [x] [] [] [] [] []    Sitting Dynamic [] [x] [] [] [] []              Standing Static [] [] [x] [] [] []    Standing Dynamic [] [] [] [x] [] []      GAIT: I Mod I S SBA CGA Min Mod Max Total  NT x2 Comments:   Level of Assistance [] [] [] [] [] [] [] [] [] [] []    Distance 4 feet    DME Rolling Walker    Gait Quality N/A    Weightbearing Status      Stairs      I=Independent, Mod I=Modified Independent, S=Supervision, SBA=Standby Assistance, CGA=Contact Guard Assistance,   Min=Minimal Assistance, Mod=Moderate Assistance, Max=Maximal Assistance, Total=Total Assistance, NT=Not Tested    PLAN:   ACUTE PHYSICAL THERAPY GOALS:   (Developed with and agreed upon by patient and/or caregiver.)    (1.)Ms. Radha Sheehan will move from supine to sit and sit to supine , scoot up and down and roll side to side in bed with INDEPENDENCE within 7 treatment day(s). (2.)Ms. Radha Sheehan will transfer from bed to chair and chair to bed with MODIFIED INDEPENDENCE using the least restrictive device within 7 treatment day(s). (3.)Ms. Radha Sheehan will ambulate with SUPERVISION for a minimum of 100 feet with the least restrictive device within 7 treatment day(s). (4.) Ms. Radha Sheehan will tolerate 25+ minutes of therapeutic exercise with breaks as needed in order to demonstrate improved activity tolerance within 7 treatment days. FREQUENCY AND DURATION: BID for duration of hospital stay or until stated goals are met, whichever comes first.    TREATMENT:   TREATMENT:   Co-Treatment PT/OT necessary due to patient's decreased overall endurance/tolerance levels, as well as need for high level skilled assistance to complete functional transfers/mobility and functional tasks  Therapeutic Activity (15 Minutes): Therapeutic activity included Rolling, Supine to Sit, Transfer Training, Sitting balance  and Standing balance to improve functional Activity tolerance, Balance, Mobility and Strength.     TREATMENT GRID:  N/A    AFTER TREATMENT PRECAUTIONS: Bed/Chair Locked, Chair, Needs within reach and RN notified    INTERDISCIPLINARY COLLABORATION:  RN/ PCT, PT/ PTA and OT/ RIOS    EDUCATION:      TIME IN/OUT:  Time In: 1325  Time Out: 14[address] [facility]  Minutes: 2000 [facility]

## 2022-06-20 LAB
ERYTHROCYTE [DISTWIDTH] IN BLOOD BY AUTOMATED COUNT: 13.3 % (ref 11.9–14.6)
HCT VFR BLD AUTO: 21.6 % (ref 35.8–46.3)
HGB BLD-MCNC: 6.9 G/DL (ref 11.7–15.4)
HISTORY CHECK: NORMAL
MCH RBC QN AUTO: 30.4 PG (ref 26.1–32.9)
MCHC RBC AUTO-ENTMCNC: 31.9 G/DL (ref 31.4–35)
MCV RBC AUTO: 95.2 FL (ref 79.6–97.8)
NRBC # BLD: 0 K/UL (ref 0–0.2)
PLATELET # BLD AUTO: 154 K/UL (ref 150–450)
PMV BLD AUTO: 10.5 FL (ref 9.4–12.3)
RBC # BLD AUTO: 2.27 M/UL (ref 4.05–5.2)
WBC # BLD AUTO: 11.4 K/UL (ref 4.3–11.1)

## 2022-06-20 PROCEDURE — 36430 TRANSFUSION BLD/BLD COMPNT: CPT

## 2022-06-20 PROCEDURE — 86923 COMPATIBILITY TEST ELECTRIC: CPT

## 2022-06-20 PROCEDURE — 6370000000 HC RX 637 (ALT 250 FOR IP): Performed by: ORTHOPAEDIC SURGERY

## 2022-06-20 PROCEDURE — 85027 COMPLETE CBC AUTOMATED: CPT

## 2022-06-20 PROCEDURE — 1100000000 HC RM PRIVATE

## 2022-06-20 PROCEDURE — 6370000000 HC RX 637 (ALT 250 FOR IP): Performed by: INTERNAL MEDICINE

## 2022-06-20 PROCEDURE — 86901 BLOOD TYPING SEROLOGIC RH(D): CPT

## 2022-06-20 PROCEDURE — 97530 THERAPEUTIC ACTIVITIES: CPT

## 2022-06-20 PROCEDURE — 2580000003 HC RX 258: Performed by: ORTHOPAEDIC SURGERY

## 2022-06-20 PROCEDURE — 36415 COLL VENOUS BLD VENIPUNCTURE: CPT

## 2022-06-20 PROCEDURE — P9016 RBC LEUKOCYTES REDUCED: HCPCS

## 2022-06-20 PROCEDURE — 30233N1 TRANSFUSION OF NONAUTOLOGOUS RED BLOOD CELLS INTO PERIPHERAL VEIN, PERCUTANEOUS APPROACH: ICD-10-PCS | Performed by: INTERNAL MEDICINE

## 2022-06-20 PROCEDURE — 97116 GAIT TRAINING THERAPY: CPT

## 2022-06-20 RX ORDER — SODIUM CHLORIDE 9 MG/ML
INJECTION, SOLUTION INTRAVENOUS PRN
Status: DISCONTINUED | OUTPATIENT
Start: 2022-06-20 | End: 2022-06-21 | Stop reason: HOSPADM

## 2022-06-20 RX ADMIN — ASPIRIN 325 MG: 325 TABLET, COATED ORAL at 08:19

## 2022-06-20 RX ADMIN — AMLODIPINE BESYLATE 10 MG: 10 TABLET ORAL at 08:20

## 2022-06-20 RX ADMIN — PANTOPRAZOLE SODIUM 40 MG: 40 TABLET, DELAYED RELEASE ORAL at 06:06

## 2022-06-20 RX ADMIN — CLONAZEPAM 0.5 MG: 0.5 TABLET ORAL at 20:19

## 2022-06-20 RX ADMIN — SODIUM CHLORIDE, PRESERVATIVE FREE 10 ML: 5 INJECTION INTRAVENOUS at 20:21

## 2022-06-20 RX ADMIN — OXYCODONE 5 MG: 5 TABLET ORAL at 20:19

## 2022-06-20 RX ADMIN — ISOSORBIDE MONONITRATE 60 MG: 60 TABLET, EXTENDED RELEASE ORAL at 08:19

## 2022-06-20 RX ADMIN — ACETAMINOPHEN 650 MG: 325 TABLET ORAL at 20:20

## 2022-06-20 RX ADMIN — METOPROLOL SUCCINATE 25 MG: 50 TABLET, EXTENDED RELEASE ORAL at 08:19

## 2022-06-20 RX ADMIN — ATORVASTATIN CALCIUM 10 MG: 10 TABLET, FILM COATED ORAL at 20:19

## 2022-06-20 RX ADMIN — OXYCODONE HYDROCHLORIDE AND ACETAMINOPHEN 500 MG: 500 TABLET ORAL at 08:20

## 2022-06-20 RX ADMIN — SODIUM CHLORIDE, PRESERVATIVE FREE 10 ML: 5 INJECTION INTRAVENOUS at 08:21

## 2022-06-20 ASSESSMENT — PAIN DESCRIPTION - LOCATION
LOCATION: LEG
LOCATION: LEG

## 2022-06-20 ASSESSMENT — PAIN SCALES - GENERAL
PAINLEVEL_OUTOF10: 4
PAINLEVEL_OUTOF10: 4
PAINLEVEL_OUTOF10: 0

## 2022-06-20 ASSESSMENT — PAIN - FUNCTIONAL ASSESSMENT
PAIN_FUNCTIONAL_ASSESSMENT: PREVENTS OR INTERFERES SOME ACTIVE ACTIVITIES AND ADLS
PAIN_FUNCTIONAL_ASSESSMENT: PREVENTS OR INTERFERES SOME ACTIVE ACTIVITIES AND ADLS

## 2022-06-20 ASSESSMENT — PAIN DESCRIPTION - DESCRIPTORS
DESCRIPTORS: ACHING
DESCRIPTORS: ACHING

## 2022-06-20 ASSESSMENT — PAIN DESCRIPTION - PAIN TYPE: TYPE: ACUTE PAIN

## 2022-06-20 ASSESSMENT — PAIN DESCRIPTION - ONSET: ONSET: GRADUAL

## 2022-06-20 ASSESSMENT — PAIN DESCRIPTION - ORIENTATION
ORIENTATION: LEFT
ORIENTATION: LEFT

## 2022-06-20 ASSESSMENT — PAIN DESCRIPTION - FREQUENCY: FREQUENCY: CONTINUOUS

## 2022-06-20 NOTE — PROGRESS NOTES
PHYSICAL THERAPY Daily Note and PM  (Link to Caseload Tracking: PT Visit Days : 4  Time In/Out PT Charge Capture  Rehab Caseload Tracker  Orders      Ebony Hawkins is a 80 y.o. female   PRIMARY DIAGNOSIS: Closed displaced fracture of left femoral neck (HCC)  Closed fracture of neck of left femur, initial encounter ([facility] [address] [S72.002A]  Closed displaced fracture of left femoral neck (HCC) [S72.002A]  Procedure(s) (LRB):  LEFT HIP HEMIARTHROPLASTY/ CHOICE (Left)  4 Days Post-Op  Inpatient: Payor: MEDICARE / Plan: MEDICARE PART A AND B / Product Type: *No Product type* /     ASSESSMENT:     REHAB RECOMMENDATIONS:   Recommendation to date pending progress:  Setting:   Short-term Rehab    Equipment:     To Be Determined     ASSESSMENT:  Ms. Mckenzie Beasley was supine upon contact and agreeable to PT. Patient performed supine to sit and transfer to standing with mod assist, additional time, and cues for technique. Once standing patient performed gait training x 8' with rolling walker, min assist and chair follow for safety. Patient returned to recliner chair where she then participated great in LE exercises. Unable to recall hip precautions. Educacted patient on hip precautions and emphasized importance. AAROM exercises to LLE. Steady progress. No goals have been met. Will continue PT efforts.      SUBJECTIVE:   Ms. Mckenzie Beasley, \"I know I can't go home like this\"    Social/Functional Lives With: Daughter  Type of Home: Mobile home  Home Layout: One level  Home Access: Stairs to enter with rails  Entrance Stairs - Number of Steps: 6  Home Equipment: Doc Moncada Help From: Family  ADL Assistance: Independent  Homemaking Assistance: Independent  Ambulation Assistance: Independent  Transfer Assistance: Independent  Active : Yes  Mode of Transportation: Car  Occupation: Retired  OBJECTIVE:     PAIN: VITALS / O2: PRECAUTION / Michael Ahr / DRAINS:   Pre Treatment:   Pain Level: 0      Post Treatment: 0 Vitals       Oxygen None    RESTRICTIONS/PRECAUTIONS:  Restrictions/Precautions  Restrictions/Precautions: Fall Risk  Required Braces or Orthoses?: No  Position Activity Restriction  Hip Precautions: No hip flexion > 90 degrees,No ABduction,No hip internal rotation  Restrictions/Precautions: Fall Risk  Required Braces or Orthoses?: No     MOBILITY: I Mod I S SBA CGA Min Mod Max Total  NT x2 Comments:   Bed Mobility    Rolling [] [] [] [] [] [] [] [] [] [] []    Supine to Sit [] [] [] [] [] [] [x] [] [] [x] []    Scooting [] [] [] [] [] [] [] [] [] [] []    Sit to Supine [] [] [] [] [] [] [] [] [] [] []    Transfers    Sit to Stand [] [] [] [] [] [] [x] [] [] [] []    Bed to Chair [] [] [] [] [] [] [x] [] [] [] []    Stand to Sit [] [] [] [] [] [] [x] [] [] [] []     [] [] [] [] [] [] [] [] [] [] []    I=Independent, Mod I=Modified Independent, S=Supervision, SBA=Standby Assistance, CGA=Contact Guard Assistance,   Min=Minimal Assistance, Mod=Moderate Assistance, Max=Maximal Assistance, Total=Total Assistance, NT=Not Tested    BALANCE: Good Fair+ Fair Fair- Poor NT Comments   Sitting Static [x] [] [] [] [] []    Sitting Dynamic [] [x] [] [] [] []              Standing Static [] [] [x] [] [] []    Standing Dynamic [] [] [] [x] [] []      GAIT: I Mod I S SBA CGA Min Mod Max Total  NT x2 Comments:   Level of Assistance [] [] [] [] [] [] [x] [] [] [] [] Chair follow   Distance 8 feet    DME Rolling Walker    Gait Quality N/A    Weightbearing Status      Stairs      I=Independent, Mod I=Modified Independent, S=Supervision, SBA=Standby Assistance, CGA=Contact Guard Assistance,   Min=Minimal Assistance, Mod=Moderate Assistance, Max=Maximal Assistance, Total=Total Assistance, NT=Not Tested    PLAN:   ACUTE PHYSICAL THERAPY GOALS:   (Developed with and agreed upon by patient and/or caregiver.)    (1.)Ms. Madi Soni will move from supine to sit and sit to supine , scoot up and down and roll side to side in bed with INDEPENDENCE within 7 treatment day(s). (2.)Ms. Angela Chandra will transfer from bed to chair and chair to bed with MODIFIED INDEPENDENCE using the least restrictive device within 7 treatment day(s). (3.)Ms. Angela Chandra will ambulate with SUPERVISION for a minimum of 100 feet with the least restrictive device within 7 treatment day(s). (4.) Ms. Angela Chandra will tolerate 25+ minutes of therapeutic exercise with breaks as needed in order to demonstrate improved activity tolerance within 7 treatment days. FREQUENCY AND DURATION: BID for duration of hospital stay or until stated goals are met, whichever comes first.    TREATMENT:   TREATMENT:   Therapeutic Activity (14 Minutes): Therapeutic activity included Supine to Sit, Scooting, Transfer Training, Sitting balance , Standing balance and AAROM Exercises to improve functional Activity tolerance, Balance, Mobility and Strength. Gait Training (10 Minutes): Gait training for 8 feet utilizing [address] [facility]. Patient required Manual, Tactile, Verbal and Visual cueing to improve Activity Pacing, Assistive Device Utilization and Dynamic Standing Balance. TREATMENT GRID:  N/A    AFTER TREATMENT PRECAUTIONS: Bed/Chair Locked, Call light within reach, Chair, Needs within reach, RN notified and Visitors at bedside    INTERDISCIPLINARY COLLABORATION:  RN/ PCT and PT/ LOWELL    EDUCATION:      TIME IN/OUT:  Time In: 1005  Time Out: [address] [place]  Minutes: [address] [facility] Rodrigues PTA

## 2022-06-20 NOTE — PROGRESS NOTES
PHYSICAL THERAPY Daily Note and PM  (Link to Caseload Tracking: PT Visit Days : 4  Time In/Out PT Charge Capture  Rehab Caseload Tracker  Orders      Arian Fernández is a 80 y.o. female   PRIMARY DIAGNOSIS: Closed displaced fracture of left femoral neck (HCC)  Closed fracture of neck of left femur, initial encounter ([facility] [address] [S72.002A]  Closed displaced fracture of left femoral neck (HCC) [S72.002A]  Procedure(s) (LRB):  LEFT HIP HEMIARTHROPLASTY/ CHOICE (Left)  4 Days Post-Op  Inpatient: Payor: MEDICARE / Plan: MEDICARE PART A AND B / Product Type: *No Product type* /     ASSESSMENT:     REHAB RECOMMENDATIONS:   Recommendation to date pending progress:  Setting:   Short-term Rehab    Equipment:     To Be Determined     ASSESSMENT:  Ms. Shahid Díaz was sitting up in recliner  Chair upon contact and agreeable to PT. Patient participated great in LE exercises to BLE's with good ability and participation. Patient then transferred to standing with min assist and increased gait distance to 15' with rolling walker, min assist and chair follow for safety. Patient returned to EOB and to supine with mod assist. Unable to recall hip precautions. Educacted patient on hip precautions and emphasized importance. Steady progress. No goals have been met. Will continue PT efforts.      SUBJECTIVE:   Ms. Shahid Díaz, \"I c an't tell if it's getting easier\"    Social/Functional Lives With: Daughter  Type of Home: Mobile home  Home Layout: One level  Home Access: Stairs to enter with rails  Entrance Stairs - Number of Steps: 6  Home Equipment: Kapil Dias Help From: Family  ADL Assistance: Independent  Homemaking Assistance: Independent  Ambulation Assistance: Independent  Transfer Assistance: Independent  Active : Yes  Mode of Transportation: Car  Occupation: Retired  OBJECTIVE:     PAIN: VITALS / O2: PRECAUTION / Cori Sanders / DRAINS:   Pre Treatment:   Pain Level: 0      Post Treatment: 0 Vitals       Oxygen None    RESTRICTIONS/PRECAUTIONS:  Restrictions/Precautions  Restrictions/Precautions: Fall Risk  Required Braces or Orthoses?: No  Position Activity Restriction  Hip Precautions: No hip flexion > 90 degrees,No ABduction,No hip internal rotation  Restrictions/Precautions: Fall Risk  Required Braces or Orthoses?: No     MOBILITY: I Mod I S SBA CGA Min Mod Max Total  NT x2 Comments:   Bed Mobility    Rolling [] [] [] [] [] [] [] [] [] [] []    Supine to Sit [] [] [] [] [] [] [] [] [] [] []    Scooting [] [] [] [] [] [] [] [] [] [] []    Sit to Supine [] [] [] [] [] [] [x] [] [] [] []    Transfers    Sit to Stand [] [] [] [] [] [x] [] [] [] [] []    Bed to Chair [] [] [] [] [] [x] [] [] [] [] []    Stand to Sit [] [] [] [] [] [x] [] [] [] [] []     [] [] [] [] [] [] [] [] [] [] []    I=Independent, Mod I=Modified Independent, S=Supervision, SBA=Standby Assistance, CGA=Contact Guard Assistance,   Min=Minimal Assistance, Mod=Moderate Assistance, Max=Maximal Assistance, Total=Total Assistance, NT=Not Tested    BALANCE: Good Fair+ Fair Fair- Poor NT Comments   Sitting Static [x] [] [] [] [] []    Sitting Dynamic [] [x] [] [] [] []              Standing Static [] [] [x] [] [] []    Standing Dynamic [] [] [] [x] [] []      GAIT: I Mod I S SBA CGA Min Mod Max Total  NT x2 Comments:   Level of Assistance [] [] [] [] [] [x] [] [] [] [] [] Chair follow   Distance 15 feet    DME Rolling Walker    Gait Quality N/A    Weightbearing Status      Stairs      I=Independent, Mod I=Modified Independent, S=Supervision, SBA=Standby Assistance, CGA=Contact Guard Assistance,   Min=Minimal Assistance, Mod=Moderate Assistance, Max=Maximal Assistance, Total=Total Assistance, NT=Not Tested    PLAN:   ACUTE PHYSICAL THERAPY GOALS:   (Developed with and agreed upon by patient and/or caregiver.)    (1.)Ms. [place] will move from supine to sit and sit to supine , scoot up and down and roll side to side in bed with INDEPENDENCE within 7 treatment day(s). (2.)Ms. Madi Soni will transfer from bed to chair and chair to bed with MODIFIED INDEPENDENCE using the least restrictive device within 7 treatment day(s). (3.)Ms. Madi Soni will ambulate with SUPERVISION for a minimum of 100 feet with the least restrictive device within 7 treatment day(s). (4.) Ms. Madi Soni will tolerate 25+ minutes of therapeutic exercise with breaks as needed in order to demonstrate improved activity tolerance within 7 treatment days. FREQUENCY AND DURATION: BID for duration of hospital stay or until stated goals are met, whichever comes first.    TREATMENT:   TREATMENT:   Therapeutic Activity (15 Minutes): Therapeutic activity included Sit to Supine, Scooting, Transfer Training, Sitting balance , Standing balance and AAROM Exercises to improve functional Activity tolerance, Balance, Mobility and Strength. Gait Training (10 Minutes): Gait training for 15 feet utilizing [address] [facility]. Patient required Manual, Tactile, Verbal and Visual cueing to improve Activity Pacing, Assistive Device Utilization and Dynamic Standing Balance. TREATMENT GRID:  N/A    AFTER TREATMENT PRECAUTIONS: Alarm Activated, Bed, Bed/Chair Locked, Call light within reach, Needs within reach, RN notified and Visitors at bedside    INTERDISCIPLINARY COLLABORATION:  RN/ PCT and PT/ PTA    EDUCATION:      TIME IN/OUT:  Time In: 1325  Time Out: [address]  Minutes: Chente Rodrigues PTA

## 2022-06-20 NOTE — PLAN OF CARE
Problem: Discharge Planning  Goal: Discharge to home or other facility with appropriate resources  Outcome: Progressing  Flowsheets (Taken 6/20/2022 0820)  Discharge to home or other facility with appropriate resources: Identify barriers to discharge with patient and caregiver     Problem: Pain  Goal: Verbalizes/displays adequate comfort level or baseline comfort level  Outcome: Progressing     Problem: Skin/Tissue Integrity  Goal: Absence of new skin breakdown  Description: 1. Monitor for areas of redness and/or skin breakdown  2. Assess vascular access sites hourly  3. Every 4-6 hours minimum:  Change oxygen saturation probe site  4. Every 4-6 hours:  If on nasal continuous positive airway pressure, respiratory therapy assess nares and determine need for appliance change or resting period.   Outcome: Progressing     Problem: Safety - Adult  Goal: Free from fall injury  Outcome: Progressing  Flowsheets (Taken 6/19/2022 2217 by Aracelis Benson RN)  Free From Fall Injury: Instruct family/caregiver on patient safety     Problem: ABCDS Injury Assessment  Goal: Absence of physical injury  Outcome: Progressing     Problem: Neurosensory - Adult  Goal: Achieves stable or improved neurological status  Outcome: Progressing  Flowsheets (Taken 6/20/2022 0820)  Achieves stable or improved neurological status: Assess for and report changes in neurological status     Problem: Respiratory - Adult  Goal: Achieves optimal ventilation and oxygenation  Outcome: Progressing  Flowsheets (Taken 6/20/2022 0820)  Achieves optimal ventilation and oxygenation: Assess for changes in respiratory status     Problem: Cardiovascular - Adult  Goal: Maintains optimal cardiac output and hemodynamic stability  Outcome: Progressing  Flowsheets (Taken 6/20/2022 0820)  Maintains optimal cardiac output and hemodynamic stability: Monitor blood pressure and heart rate  Goal: Absence of cardiac dysrhythmias or at baseline  Outcome: Progressing  Flowsheets (Taken 6/20/2022 0820)  Absence of cardiac dysrhythmias or at baseline: Monitor cardiac rate and rhythm     Problem: Skin/Tissue Integrity - Adult  Goal: Skin integrity remains intact  Outcome: Progressing  Flowsheets (Taken 6/20/2022 0820)  Skin Integrity Remains Intact: Monitor for areas of redness and/or skin breakdown  Goal: Incisions, wounds, or drain sites healing without S/S of infection  Outcome: Progressing  Flowsheets (Taken 6/20/2022 0820)  Incisions, Wounds, or Drain Sites Healing Without Sign and Symptoms of Infection: ADMISSION and DAILY: Assess and document risk factors for pressure ulcer development  Goal: Oral mucous membranes remain intact  Outcome: Progressing  Flowsheets (Taken 6/20/2022 0820)  Oral Mucous Membranes Remain Intact: Assess oral mucosa and hygiene practices     Problem: Musculoskeletal - Adult  Goal: Return mobility to safest level of function  Outcome: Progressing  Flowsheets (Taken 6/20/2022 0820)  Return Mobility to Safest Level of Function: Assess patient stability and activity tolerance for standing, transferring and ambulating with or without assistive devices  Goal: Maintain proper alignment of affected body part  Outcome: Progressing  Flowsheets (Taken 6/20/2022 0820)  Maintain proper alignment of affected body part: Support and protect limb and body alignment per provider's orders  Goal: Return ADL status to a safe level of function  Outcome: Progressing  Flowsheets (Taken 6/20/2022 0820)  Return ADL Status to a Safe Level of Function: Administer medication as ordered     Problem: Gastrointestinal - Adult  Goal: Minimal or absence of nausea and vomiting  Outcome: Progressing  Flowsheets (Taken 6/20/2022 0820)  Minimal or absence of nausea and vomiting: Administer IV fluids as ordered to ensure adequate hydration  Goal: Maintains or returns to baseline bowel function  Outcome: Progressing  Flowsheets (Taken 6/20/2022 0820)  Maintains or returns to baseline bowel function: Assess bowel function  Goal: Maintains adequate nutritional intake  Outcome: Progressing  Flowsheets (Taken 6/20/2022 0820)  Maintains adequate nutritional intake: Monitor percentage of each meal consumed     Problem: Genitourinary - Adult  Goal: Absence of urinary retention  Outcome: Progressing  Flowsheets (Taken 6/20/2022 0820)  Absence of urinary retention: Assess patients ability to void and empty bladder     Problem: Infection - Adult  Goal: Absence of infection at discharge  Outcome: Progressing  Flowsheets (Taken 6/20/2022 0820)  Absence of infection at discharge: Assess and monitor for signs and symptoms of infection  Goal: Absence of infection during hospitalization  Outcome: Progressing  Flowsheets (Taken 6/20/2022 0820)  Absence of infection during hospitalization: Assess and monitor for signs and symptoms of infection  Goal: Absence of fever/infection during anticipated neutropenic period  Outcome: Progressing  Flowsheets (Taken 6/20/2022 0820)  Absence of fever/infection during anticipated neutropenic period: Monitor white blood cell count     Problem: Metabolic/Fluid and Electrolytes - Adult  Goal: Electrolytes maintained within normal limits  Outcome: Progressing  Flowsheets (Taken 6/20/2022 0820)  Electrolytes maintained within normal limits: Monitor labs and assess patient for signs and symptoms of electrolyte imbalances  Goal: Hemodynamic stability and optimal renal function maintained  Outcome: Progressing  Flowsheets (Taken 6/20/2022 0820)  Hemodynamic stability and optimal renal function maintained: Monitor labs and assess for signs and symptoms of volume excess or deficit     Problem: Hematologic - Adult  Goal: Maintains hematologic stability  Outcome: Progressing  Flowsheets (Taken 6/20/2022 0820)  Maintains hematologic stability: Assess for signs and symptoms of bleeding or hemorrhage

## 2022-06-20 NOTE — PROGRESS NOTES
OT Note:    OT attempted to see patient for therapy today. Pt has low HGB today (6.9) and was receiving blood. Will hold treatment today and see patient at a later date/time.     Thanks,  SHAMIKA Sanchez

## 2022-06-20 NOTE — PROGRESS NOTES
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 6/16/2022 Yes    Hypertension 6/15/2022 Yes    CAD (coronary artery disease) 6/15/2022 Yes          Objective:     Patient Vitals for the past 24 hrs:   Temp Pulse Resp BP SpO2   06/20/22 0430 98.6 °F (37 °C) 81 16 (!) 133/46 91 %   06/19/22 2316 100 °F (37.8 °C) 87 16 (!) 130/47 90 %   06/19/22 2112 -- -- 20 -- --   06/19/22 2035 98.6 °F (37 °C) 87 20 (!) 140/57 94 %   06/19/22 1600 98.2 °F (36.8 °C) 77 17 (!) 115/46 98 %   06/19/22 1200 98.3 °F (36.8 °C) 80 17 (!) 111/48 92 %       Estimated body mass index is 27.02 kg/m² as calculated from the following:    Height as of this encounter: 5' 1\" (1.549 m). Weight as of this encounter: 143 lb (64.9 kg). No intake or output data in the 24 hours ending 06/20/22 0907      Physical Exam:   Blood pressure (!) 133/46, pulse 81, temperature 98.6 °F (37 °C), temperature source Oral, resp. rate 16, height 5' 1\" (1.549 m), weight 143 lb (64.9 kg), SpO2 91 %. General:    Well nourished. No overt distress. Head:  Normocephalic, atraumatic  Eyes:  Sclerae appear normal. Pupils equally round. ENT:  Nares appear normal, no drainage. Moist oral mucosa  Neck:  No restricted ROM. Trachea midline. CV:   RRR. No m/r/g. No jugular venous distension. Lungs:   CTAB. No wheezing, rhonchi, or rales. Respirations even, unlabored. Abdomen: Bowel sounds present. Soft, nontender, nondistended. Extremities: No cyanosis or clubbing. L hip edema, surgical dressing is saturated, mild amount of dried blood (same as yesterday). Skin:     No rashes and normal coloration. Warm and dry. Neuro:  CN II-XII grossly intact. Sensation intact. A&Ox3  Psych:  Normal mood and affect.       I have reviewed ordered lab tests and independently visualized imaging below:    Recent Labs:  Recent Results (from the past 48 hour(s))   CBC    Collection Time: 06/19/22 11:30 AM   Result Value Ref Range    WBC 11.8 (H) 4.3 - 11.1 K/uL    RBC 2.32 (L) 4.05 - 5.2 M/uL    Hemoglobin 7.1 (L) 11.7 - 15.4 g/dL    Hematocrit 21.9 (L) 35.8 - 46.3 %    MCV 94.4 79.6 - 97.8 FL    MCH 30.6 26.1 - 32.9 PG    MCHC 32.4 31.4 - 35.0 g/dL    RDW 13.4 11.9 - 14.6 %    Platelets 493 (L) 445 - 450 K/uL    MPV 10.2 9.4 - 12.3 FL    nRBC 0.00 0.0 - 0.2 K/uL   CBC    Collection Time: 06/20/22  4:30 AM   Result Value Ref Range    WBC 11.4 (H) 4.3 - 11.1 K/uL    RBC 2.27 (L) 4.05 - 5.2 M/uL    Hemoglobin 6.9 (LL) 11.7 - 15.4 g/dL    Hematocrit 21.6 (L) 35.8 - 46.3 %    MCV 95.2 79.6 - 97.8 FL    MCH 30.4 26.1 - 32.9 PG    MCHC 31.9 31.4 - 35.0 g/dL    RDW 13.3 11.9 - 14.6 %    Platelets 265 099 - 954 K/uL    MPV 10.5 9.4 - 12.3 FL    nRBC 0.00 0.0 - 0.2 K/uL         Other Studies:  No results found.     Current Meds:  Current Facility-Administered Medications   Medication Dose Route Frequency    0.9 % sodium chloride infusion   IntraVENous PRN    HYDROmorphone HCl PF (DILAUDID) injection 0.5 mg  0.5 mg IntraVENous Q4H PRN    sodium chloride flush 0.9 % injection 5-40 mL  5-40 mL IntraVENous 2 times per day    sodium chloride flush 0.9 % injection 5-40 mL  5-40 mL IntraVENous PRN    0.9 % sodium chloride infusion   IntraVENous PRN    ondansetron (ZOFRAN-ODT) disintegrating tablet 4 mg  4 mg Oral Q8H PRN    Or    ondansetron (ZOFRAN) injection 4 mg  4 mg IntraVENous Q6H PRN    aspirin EC tablet 325 mg  325 mg Oral Daily    atorvastatin (LIPITOR) tablet 10 mg  10 mg Oral Nightly    amLODIPine (NORVASC) tablet 10 mg  10 mg Oral Daily    ascorbic acid (VITAMIN C) tablet 500 mg  500 mg Oral Daily    pantoprazole (PROTONIX) tablet 40 mg  40 mg Oral QAM AC    isosorbide mononitrate (IMDUR) extended release tablet 60 mg  60 mg Oral Daily    metoprolol succinate (TOPROL XL) extended release tablet 25 mg  25 mg Oral Daily    clonazePAM (KLONOPIN) tablet 0.5 mg  0.5 mg Oral Nightly    polyethylene glycol (GLYCOLAX) packet 17 g  17 g Oral Daily PRN    acetaminophen (TYLENOL) tablet 650 mg  650 mg Oral Q6H PRN    Or    acetaminophen (TYLENOL) suppository 650 mg  650 mg Rectal Q6H PRN    oxyCODONE (ROXICODONE) immediate release tablet 5 mg  5 mg Oral Q4H PRN       Signed:  Sabine Rosen MD    Part of this note may have been written by using a voice dictation software. The note has been proof read but may still contain some grammatical/other typographical errors.

## 2022-06-21 ENCOUNTER — APPOINTMENT (OUTPATIENT)
Dept: PHYSICAL THERAPY | Age: 87
End: 2022-06-21
Payer: MEDICARE

## 2022-06-21 VITALS
RESPIRATION RATE: 16 BRPM | SYSTOLIC BLOOD PRESSURE: 137 MMHG | BODY MASS INDEX: 27 KG/M2 | HEART RATE: 79 BPM | WEIGHT: 143 LBS | OXYGEN SATURATION: 94 % | DIASTOLIC BLOOD PRESSURE: 56 MMHG | HEIGHT: 61 IN | TEMPERATURE: 98.2 F

## 2022-06-21 PROBLEM — D69.6 THROMBOCYTOPENIA (HCC): Status: RESOLVED | Noted: 2022-06-19 | Resolved: 2022-06-21

## 2022-06-21 PROBLEM — F41.9 ANXIETY: Status: ACTIVE | Noted: 2022-06-21

## 2022-06-21 LAB
ABO + RH BLD: NORMAL
BLD PROD TYP BPU: NORMAL
BLOOD BANK DISPENSE STATUS: NORMAL
BLOOD GROUP ANTIBODIES SERPL: NORMAL
BPU ID: NORMAL
CROSSMATCH RESULT: NORMAL
HCT VFR BLD AUTO: 23.5 % (ref 35.8–46.3)
HGB BLD-MCNC: 7.8 G/DL (ref 11.7–15.4)
SARS-COV-2 RDRP RESP QL NAA+PROBE: NOT DETECTED
SOURCE: NORMAL
SPECIMEN EXP DATE BLD: NORMAL
UNIT DIVISION: 0

## 2022-06-21 PROCEDURE — 97530 THERAPEUTIC ACTIVITIES: CPT

## 2022-06-21 PROCEDURE — 85018 HEMOGLOBIN: CPT

## 2022-06-21 PROCEDURE — 87635 SARS-COV-2 COVID-19 AMP PRB: CPT

## 2022-06-21 PROCEDURE — 97116 GAIT TRAINING THERAPY: CPT

## 2022-06-21 PROCEDURE — 6370000000 HC RX 637 (ALT 250 FOR IP): Performed by: ORTHOPAEDIC SURGERY

## 2022-06-21 PROCEDURE — 36415 COLL VENOUS BLD VENIPUNCTURE: CPT

## 2022-06-21 PROCEDURE — 2580000003 HC RX 258: Performed by: ORTHOPAEDIC SURGERY

## 2022-06-21 RX ORDER — OXYCODONE HYDROCHLORIDE 5 MG/1
5 TABLET ORAL EVERY 4 HOURS PRN
Qty: 18 TABLET | Refills: 0 | Status: SHIPPED | OUTPATIENT
Start: 2022-06-21 | End: 2022-06-24

## 2022-06-21 RX ORDER — CLONAZEPAM 0.5 MG/1
0.5 TABLET ORAL NIGHTLY PRN
Qty: 3 TABLET | Refills: 0 | Status: SHIPPED | OUTPATIENT
Start: 2022-06-21 | End: 2022-08-26

## 2022-06-21 RX ADMIN — SODIUM CHLORIDE, PRESERVATIVE FREE 10 ML: 5 INJECTION INTRAVENOUS at 09:19

## 2022-06-21 RX ADMIN — METOPROLOL SUCCINATE 25 MG: 50 TABLET, EXTENDED RELEASE ORAL at 09:19

## 2022-06-21 RX ADMIN — AMLODIPINE BESYLATE 10 MG: 10 TABLET ORAL at 09:19

## 2022-06-21 RX ADMIN — OXYCODONE HYDROCHLORIDE AND ACETAMINOPHEN 500 MG: 500 TABLET ORAL at 09:19

## 2022-06-21 RX ADMIN — ISOSORBIDE MONONITRATE 60 MG: 60 TABLET, EXTENDED RELEASE ORAL at 09:19

## 2022-06-21 RX ADMIN — ASPIRIN 325 MG: 325 TABLET, COATED ORAL at 09:19

## 2022-06-21 RX ADMIN — PANTOPRAZOLE SODIUM 40 MG: 40 TABLET, DELAYED RELEASE ORAL at 05:23

## 2022-06-21 RX ADMIN — OXYCODONE 5 MG: 5 TABLET ORAL at 09:24

## 2022-06-21 ASSESSMENT — PAIN SCALES - GENERAL
PAINLEVEL_OUTOF10: 0
PAINLEVEL_OUTOF10: 5

## 2022-06-21 ASSESSMENT — PAIN DESCRIPTION - PAIN TYPE: TYPE: SURGICAL PAIN

## 2022-06-21 ASSESSMENT — PAIN DESCRIPTION - DESCRIPTORS: DESCRIPTORS: ACHING

## 2022-06-21 ASSESSMENT — PAIN DESCRIPTION - FREQUENCY: FREQUENCY: INTERMITTENT

## 2022-06-21 ASSESSMENT — PAIN DESCRIPTION - ORIENTATION: ORIENTATION: LEFT

## 2022-06-21 ASSESSMENT — PAIN DESCRIPTION - ONSET: ONSET: GRADUAL

## 2022-06-21 ASSESSMENT — PAIN - FUNCTIONAL ASSESSMENT: PAIN_FUNCTIONAL_ASSESSMENT: PREVENTS OR INTERFERES SOME ACTIVE ACTIVITIES AND ADLS

## 2022-06-21 ASSESSMENT — PAIN DESCRIPTION - LOCATION: LOCATION: LEG

## 2022-06-21 NOTE — PROGRESS NOTES
PHYSICAL THERAPY Daily Note and AM  (Link to Caseload Tracking: PT Visit Days : 5  Time In/Out PT Charge Capture  Rehab Caseload Tracker  Orders      Krishna Hester is a 80 y.o. female   PRIMARY DIAGNOSIS: Closed displaced fracture of left femoral neck (HCC)  Closed fracture of neck of left femur, initial encounter ([facility] [address] [S72.002A]  Closed displaced fracture of left femoral neck (HCC) [S72.002A]  Procedure(s) (LRB):  LEFT HIP HEMIARTHROPLASTY/ CHOICE (Left)  5 Days Post-Op  Inpatient: Payor: MEDICARE / Plan: MEDICARE PART A AND B / Product Type: *No Product type* /     ASSESSMENT:     REHAB RECOMMENDATIONS:   Recommendation to date pending progress:  Setting:   Short-term Rehab    Equipment:     To Be Determined     ASSESSMENT:  Ms. Jean Palma was supine upon contact and agreeable to PT. Patient performed supine to sit with mod assist and cues for technique then transfer to standing with min assist. Once standing patient performed gait training x 15' with rolling walker, CGA-min assist and chair follow for safety. Patient then requested to use [facility] which she required cues and min assist to transfer onto. Patient demonstrated fair standing balance during standing ADL activity. Steady progress. No goals have been met. Will continue PT efforts.      SUBJECTIVE:   Ms. Jean Palma, \"I take metamucil every day\"    Social/Functional Lives With: Daughter  Type of Home: Mobile home  Home Layout: One level  Home Access: Stairs to enter with rails  Entrance Stairs - Number of Steps: 6  Home Equipment: Binta Rand Help From: Family  ADL Assistance: Independent  Homemaking Assistance: Independent  Ambulation Assistance: Independent  Transfer Assistance: Independent  Active : Yes  Mode of Transportation: Car  Occupation: Retired  OBJECTIVE:     PAIN: VITALS / O2: Selestino Flatten / Jennifer Seashore / DRAINS:   Pre Treatment:  0         Post Treatment: 0 Vitals       Oxygen None    RESTRICTIONS/PRECAUTIONS:  Restrictions/Precautions  Restrictions/Precautions: Fall Risk  Required Braces or Orthoses?: No  Position Activity Restriction  Hip Precautions: No hip flexion > 90 degrees,No ABduction,No hip internal rotation  Restrictions/Precautions: Fall Risk  Required Braces or Orthoses?: No     MOBILITY: I Mod I S SBA CGA Min Mod Max Total  NT x2 Comments:   Bed Mobility    Rolling [] [] [] [] [] [] [] [] [] [] []    Supine to Sit [] [] [] [] [] [] [x] [] [] [] []    Scooting [] [] [] [] [] [] [] [] [] [] []    Sit to Supine [] [] [] [] [] [] [] [] [] [] []    Transfers    Sit to Stand [] [] [] [] [] [x] [] [] [] [] []    Bed to Chair [] [] [] [] [] [x] [] [] [] [] []    Stand to Sit [] [] [] [] [] [x] [] [] [] [] []     [] [] [] [] [] [] [] [] [] [] []    I=Independent, Mod I=Modified Independent, S=Supervision, SBA=Standby Assistance, CGA=Contact Guard Assistance,   Min=Minimal Assistance, Mod=Moderate Assistance, Max=Maximal Assistance, Total=Total Assistance, NT=Not Tested    BALANCE: Good Fair+ Fair Fair- Poor NT Comments   Sitting Static [x] [] [] [] [] []    Sitting Dynamic [] [x] [] [] [] []              Standing Static [] [] [x] [] [] []    Standing Dynamic [] [] [x] [] [] []      GAIT: I Mod I S SBA CGA Min Mod Max Total  NT x2 Comments:   Level of Assistance [] [] [] [] [] [x] [] [] [] [] [] Chair follow   Distance 15 feet    DME Rolling Walker    Gait Quality N/A    Weightbearing Status      Stairs      I=Independent, Mod I=Modified Independent, S=Supervision, SBA=Standby Assistance, CGA=Contact Guard Assistance,   Min=Minimal Assistance, Mod=Moderate Assistance, Max=Maximal Assistance, Total=Total Assistance, NT=Not Tested    PLAN:   ACUTE PHYSICAL THERAPY GOALS:   (Developed with and agreed upon by patient and/or caregiver.)    (1.)Ms. Gissel Rivas will move from supine to sit and sit to supine , scoot up and down and roll side to side in bed with INDEPENDENCE within 7 treatment day(s). (2.)Ms. Kellee Collazo will transfer from bed to chair and chair to bed with MODIFIED INDEPENDENCE using the least restrictive device within 7 treatment day(s). (3.)Ms. Kellee Collazo will ambulate with SUPERVISION for a minimum of 100 feet with the least restrictive device within 7 treatment day(s). (4.) Ms. Kellee Collazo will tolerate 25+ minutes of therapeutic exercise with breaks as needed in order to demonstrate improved activity tolerance within 7 treatment days. FREQUENCY AND DURATION: BID for duration of hospital stay or until stated goals are met, whichever comes first.    TREATMENT:   TREATMENT:   Therapeutic Activity (14 Minutes): Therapeutic activity included Supine to Sit, Scooting, Transfer Training, Sitting balance  and Standing balance to improve functional Activity tolerance, Balance, Mobility and Strength. Gait Training (10 Minutes): Gait training for 15 feet utilizing [employer]. Patient required Manual, Tactile, Verbal and Visual cueing to improve Activity Pacing, Assistive Device Utilization and Dynamic Standing Balance. TREATMENT GRID:  N/A    AFTER TREATMENT PRECAUTIONS: Bed/Chair Locked, Call light within reach, Chair, Needs within reach and RN notified    INTERDISCIPLINARY COLLABORATION:  RN/ PCT and PT/ PTA    EDUCATION:      TIME IN/OUT:  Time In: 1029  Time Out: [address]  Min[facility]: [address] [facility]

## 2022-06-21 NOTE — DISCHARGE SUMMARY
Hospitalist Discharge Summary   Admit Date:  6/15/2022 11:00 AM   DC Note date: 2022  Name:  Calixto Tovar   Age:  80 y.o. Sex:  female  :  1933   MRN:  [id number]   [place]:  [facility] [place] 2[place]  PCP:  Sharif Frazier MD    Presenting Complaint: Fall     Initial Admission Diagnosis: Closed fracture of neck of left femur, initial encounter ([facility] [S72.002A]  Closed displaced fracture of left femoral neck ([facility] [address] [S72.002A]     Problem List for this Hospitalization (present on admission):    Principal Problem:    Closed displaced fracture of left femoral neck ([facility] [address]  Active Problems:    Anxiety    Aortic valve sclerosis    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 Patricia Colunga is a very nice 81 y/o WF with a h/o CAD s/p CABG, HTN, breast cancer s/p mastectomy () and CKD who was admitted to our service on 6/15 after she fell at her PCP's office. She was there for a routine follow up visit and is unclear of the details, but fell and landed on her L side. She did not have chest pain, palpitations or lose consciousness. X-ray showed a L femoral neck fracture. Orthopedic Surgery was consulted and she underwent L hip hemiarthroplasty on . She required 1U RBC transfusion on  for Hb 6.9. She had a thrombocytopenia which has resolved. Her pain is well controlled. She is medically stable for discharge to [facility] today. Disposition: STR  Diet: ADULT DIET; Regular  Code Status: Full Code    Follow Ups:   Contact information for after-discharge care     Discharge Destination     [facility]) .     Service: Skilled Nursing  Contact information:  [address]  [facility]  [phone]                           Follow up labs/diagnostics (ultimately defer to outpatient provider):  N/A      Time spent in patient discharge and coordination 35 minutes. Plan was discussed with patient, RN, CM. All questions answered. Patient was stable at time of discharge. Instructions given to call a physician or return if any concerns. Current Discharge Medication List      START taking these medications    Details   oxyCODONE (ROXICODONE) 5 MG immediate release tablet Take 1 tablet by mouth every 4 hours as needed for Pain for up to 3 days. Qty: 18 tablet, Refills: 0    Comments: Reduce doses taken as pain becomes manageable  Associated Diagnoses: Closed displaced fracture of left femoral neck (HCC)         CONTINUE these medications which have CHANGED    Details   clonazePAM (KLONOPIN) 0.5 MG tablet Take 1 tablet by mouth nightly as needed for Anxiety (or sleep) for up to 3 days.   Qty: 3 tablet, Refills: 0    Associated Diagnoses: Anxiety      aspirin 325 MG EC tablet Take 1 tablet by mouth daily  Qty: 1 tablet, Refills: 0         CONTINUE these medications which have NOT CHANGED    Details   CALCIUM PO Take by mouth      amLODIPine (NORVASC) 10 MG tablet Take by mouth daily      ascorbic acid (VITAMIN C) 500 MG tablet Take by mouth      vitamin D 25 MCG (1000 UT) CAPS Take by mouth daily      esomeprazole (NEXIUM) 40 MG delayed release capsule TAKE 1 CAPSULE BY MOUTH DAILY      isosorbide mononitrate (IMDUR) 60 MG extended release tablet Take 60 mg by mouth      meclizine (ANTIVERT) 25 MG tablet Take by mouth 3 times daily as needed      metoprolol succinate (TOPROL XL) 25 MG extended release tablet Take 25 mg by mouth daily      nitroGLYCERIN (NITROSTAT) 0.4 MG SL tablet Place 0.4 mg under the tongue      simvastatin (ZOCOR) 40 MG tablet Take 40 mg by mouth             Procedures done this admission:  Procedure(s):  LEFT HIP HEMIARTHROPLASTY/ CHOICE    Consults this admission:  IP CONSULT TO ORTHOPEDIC SURGERY  FOLLOW UP VISIT    Echocardiogram results:  No results found for this or any previous visit. Diagnostic Imaging/Tests:   XR FEMUR LEFT (MIN 2 VIEWS)    Result Date: 6/15/2022  Exam: XR FEMUR LEFT (MIN 2 VIEWS) on 6/15/2022 12:56 PM Clinical History: The Female patient is 80years old  presenting for severe left hip pain. Comparison:  none Findings: 4 views of the left femur were obtained. Transcervical fracture of the left femoral neck is demonstrated with mild varus angulation. Joint spaces are well-maintained. 1. Transcervical left femoral neck fracture. XR CHEST 1 VIEW    Result Date: 6/15/2022  Exam: XR CHEST 1 VIEW on 6/15/2022 12:44 PM Clinical History: The Female patient is 80years old  presenting for fx protocol. Comparison:  none Findings:  Frontal view of the chest was obtained. There is mild elevation the right hemidiaphragm. The lungs are otherwise grossly clear. No pleural effusions are demonstrated. The cardiomediastinal silhouette is within normal limits. There are no acute osseous abnormalities. Surgical clips are seen throughout the left axilla. Sternotomy changes are demonstrated. 1. No acute cardiopulmonary process. CPT code(s) 17194     XR HIP 2-3 VW W PELVIS LEFT    Result Date: 6/16/2022  Exam: XR HIP 2-3 VW W PELVIS LEFT on 6/16/2022 4:12 PM Clinical History: The Female patient is 80years old  presenting for hip replacement. Comparison:  none Findings: 3 views of the pelvis and left hip were obtained. No fracture or dislocation is identified. A bipolar  left hip prosthesis is in place with anatomic alignment and positioning demonstrated. There are overlying surgical staples with postoperative changes in the subcutaneous tissues     1. Bipolar left hip prosthesis. XR HIP 2-3 VW W PELVIS LEFT    Result Date: 6/15/2022  Exam: XR HIP 2-3 VW W PELVIS LEFT on 6/15/2022 12:45 PM Clinical History: The Female patient is 80years old  presenting for moderate to severe left hip pain.  Comparison:  none Findings: 3 views of the pelvis and left hip were obtained. A transcervical fracture of the left femoral neck is demonstrated with mild varus angulation. Joint spaces are well-maintained. 1. Transcervical left femoral neck fracture. Labs: Results:       BMP, Mg, Phos No results for input(s): NA, K, CL, CO2, AGAP, BUN, CREA, CA, GLU, MG, PHOS in the last 72 hours. CBC Recent Labs     06/19/22  1130 06/20/22  0430 06/21/22  0452   WBC 11.8* 11.4*  --    RBC 2.32* 2.27*  --    HGB 7.1* 6.9* 7.8*   HCT 21.9* 21.6* 23.5*   * 154  --       LFT No results for input(s): ALT, TP, ALB, GLOB in the last 72 hours.     Invalid input(s): SGOT, TBIL, AP, AGRAT, GPT   Cardiac Testing No results found for: BNP, CPK, RCK1, CKMB   Coagulation Tests Lab Results   Component Value Date    INR 1.0 06/15/2022      A1c No results found for: HBA1C   Lipid Panel No results found for: CHOL, CHOLPOCT, CHOLX, CHLST, CHOLV, 110264, HDL, HDLC, LDL, LDLC, 554351, VLDLC, VLDL, TGLX, TRIGL   Thyroid Panel No results found for: TSH, T4, FT4     Most Recent UA No results found for: MUCUS, UCOM       All Labs from Last 24 Hrs:  Recent Results (from the past 24 hour(s))   TYPE AND SCREEN    Collection Time: 06/20/22 11:20 AM   Result Value Ref Range    Crossmatch expiration date 06/23/2022,5319     ABO/Rh O POSITIVE     Antibody Screen NEG     Unit Number [id number]     Product Code Blood Bank RC LR     Unit Divison 00     Dispense Status Blood Bank TRANSFUSED     Crossmatch Result Compatible    Hemoglobin and Hematocrit    Collection Time: 06/21/22  4:52 AM   Result Value Ref Range    Hemoglobin 7.8 (L) 11.7 - 15.4 g/dL    Hematocrit 23.5 (L) 35.8 - 46.3 %       Allergies   Allergen Reactions    Penicillins Hives     Immunization History   Administered Date(s) Administered    PPD Test 06/15/2022       Recent Vital Data:  Patient Vitals for the past 24 hrs:   Temp Pulse Resp BP SpO2   06/21/22 0720 98.2 °F (36.8 °C) 79 16 (!) 137/56 94 %   06/21/22 0346 98.2 °F (36.8 °C) 79 20 (!) 143/55 94 %   06/21/22 0014 98.4 °F (36.9 °C) 69 16 (!) 124/54 95 %   06/20/22 2115 -- -- 18 -- --   06/20/22 2019 -- -- 18 -- --   06/20/22 1931 98.3 °F (36.8 °C) 78 18 (!) 130/57 95 %   06/20/22 1659 98.2 °F (36.8 °C) 76 18 (!) 124/57 96 %   06/20/22 1550 98.6 °F (37 °C) 72 16 (!) 119/49 96 %   06/20/22 1455 -- 80 20 (!) 136/50 95 %   06/20/22 1440 98.6 °F (37 °C) 78 20 (!) 134/51 92 %   06/20/22 1144 98.2 °F (36.8 °C) 78 18 (!) 111/48 95 %       Oxygen Therapy  SpO2: 94 %  Pulse Oximetry Type: Continuous  Pulse via Oximetry: 75 beats per minute  Pulse Oximeter Device Mode: Intermittent  Pulse Oximeter Device Location: Finger,Right  O2 Device: None (Room air)  O2 Flow Rate (L/min): 2 L/min    Estimated body mass index is 27.02 kg/m² as calculated from the following:    Height as of this encounter: 5' 1\" (1.549 m). Weight as of this encounter: 143 lb (64.9 kg). Intake/Output Summary (Last 24 hours) at 6/21/2022 1023  Last data filed at 6/21/2022 0347  Gross per 24 hour   Intake 799.5 ml   Output 1200 ml   Net -400.5 ml         Physical Exam:  General:    Well nourished, thin. No overt distress. Pleasant and conversant. Head:  Normocephalic, atraumatic  Eyes:  Sclerae appear normal.  Pupils equally round. HENT:  Nares appear normal, no drainage. Moist mucous membranes  Neck:  No restricted ROM. Trachea midline  CV:   RRR. No m/r/g. No JVD  Lungs:   CTAB. No wheezing, rhonchi, or rales. Even, unlabored  Abdomen:   Soft, nontender, nondistended. Extremities: Warm and dry. No cyanosis or clubbing. Edema and bruising to L hip. Surgical dressing is clean and dry. Skin:     No rashes. Normal coloration  Neuro:  CN II-XII grossly intact. Psych:  Normal mood and affect. Signed:  Vernetta Moritz, MD    Part of this note may have been written by using a voice dictation software. The note has been proof read but may still contain some grammatical/other typographical errors.

## 2022-06-21 NOTE — PROGRESS NOTES
TRANSFER - OUT REPORT:    Verbal report given to RN on Tommas Scot  being transferred to [facility] for routine progression of patient care       Report consisted of patient's Situation, Background, Assessment and   Recommendations(SBAR). Information from the following report(s) Nurse Handoff Report was reviewed with the receiving nurse. Opportunity for questions and clarification was provided.       Patient transported with Counts include 234 beds at the [facility] services scheduled for 1200

## 2022-06-21 NOTE — CARE COORDINATION
Pt is medically cleared for dc today and will transfer to room 206B at [facility] for STR services. RN to call report to #[phone]. Transport scheduled for 1200 through [employer]. SW notified pt of her transport time and left VM message with pt's dtr to update re: dc and transport time. SW remains available to assist as needed. 06/21/22 1115   Service Assessment   Patient Orientation Alert and Oriented   Cognition Alert   History Provided By Patient   Primary [address]   Patient's Healthcare Decision Maker is: Legal Next of Kin   PCP Verified by CM Yes   Last Visit to PCP Within last 3 months   Prior Functional Level Independent in ADLs/IADLs   Current Functional Level Assistance with the following:;Mobility; Bathing;Dressing; Toileting   Can patient return to prior living arrangement No   Ability to make needs known: Good   Family able to assist with home care needs: Yes   Social/Functional History   Lives With Daughter   Type of Home Mobile home   Home Layout One level   Home Access Stairs to enter with rails   Entrance Stairs - Number of Steps [id number] [facility]. Independent   Transfer Assistance Independent   Active  Yes   Mode of Transportation Car   Occupation Retired   Discharge Planning   Type of [facility] Prior To Admission None   Potential Assistance Needed N/A   DME Ordered? No   Potential Assistance Purchasing Medications No   Type of Home Care Services None   Patient expects to be discharged to: Skilled nursing facility   History of falls? 1   Services At/After Discharge   Transition of Care Consult (CM Consult) SNF  ([facility])   Partner SNF Yes   Services At/After Discharge PT;OT;Nursing services;Transport; In ambulance   [employer] Information Provided? No   Mode of Transport at Discharge S  ([address] [place] [address])   ARASELI KNOX Transport Time of Discharge 1200   Confirm Follow Up Transport Family   Condition of Participation: Discharge Planning   The Plan for Transition of Care is related to the following treatment goals: STR to improve pt's strength and functional abilities for a safe transition to home. The Patient and/or Patient Representative was provided with a Choice of Provider? Patient;Patient Representative   Name of the Patient Representative who was provided with the Choice of Provider and agrees with the Discharge Plan? Lisa Brandon/zacarias   The Patient and/Or Patient Representative agree with the Discharge Plan? Yes   Freedom of Choice list was provided with basic dialogue that supports the patient's individualized plan of care/goals, treatment preferences, and shares the quality data associated with the providers?   Yes

## 2022-06-22 ENCOUNTER — CARE COORDINATION (OUTPATIENT)
Dept: CARE COORDINATION | Facility: CLINIC | Age: 87
End: 2022-06-22

## 2022-06-22 NOTE — CARE COORDINATION
Patient discharged to [facility] on 6/21/22. Patient discharged to a [facility] facility. Patient will be included in weekly care coordination calls. Information forwarded to Erinn Kern RN, [facility] Provider [facility] RN Care Manager.

## 2022-06-22 NOTE — CARE COORDINATION
Patient discharge to [facility] for STR. Notified [facility] IDT of admission.  Patient will be followed on weekly calls with [facility] SNF team.

## 2022-06-23 ENCOUNTER — APPOINTMENT (OUTPATIENT)
Dept: PHYSICAL THERAPY | Age: 87
End: 2022-06-23
Payer: MEDICARE

## 2022-06-23 NOTE — THERAPY DISCHARGE
Calixto Tovar  : 1933  Primary: Medicare Part A And B  Secondary: [address] @ [address] [address]  Phone: [phone]  Fax: [phone] Plan Frequency: 1-2 times a week for 90 days    Plan of Care/Certification Expiration Date: 22      PT Visit Info: Total # of Visits to Date: 5      OUTPATIENT PHYSICAL THERAPY:OP NOTE TYPE: Discharge Summary 2022               Episode  Appt Desk         Treatment Diagnosis: Treatment Diagnosis: Difficulty in walking, not elsewhere classified (R26.2)  * No diagnoses found *  Medical/Referring Diagnosis:  No admission diagnoses are documented for this encounter. Referring Physician:  Sonido Rios MD Orders:  PT Eval and Treat   Return MD Appt:    Date of Onset:  Chronic  Allergies:  Penicillins  Restrictions/Precautions:    Restrictions/Precautions: Fall Risk  Required Braces or Orthoses?: No  Hip Precautions: No hip flexion > 90 degrees; No ABduction; No hip internal rotation     Medications Last Reviewed:  2022     SUBJECTIVE   History of Injury/Illness (Reason for Referral):  Patient reports getting progressively weaker over the past year. Patient has had one fall over the past couple of months. Patient reports tripping and falling over a rug. Patient ambulates with cane. Patient rates current pain level as 0/10 and current dizziness as 0/10. Patient has trouble lifting her leg up to get into her daughter's truck. Patient would like to get her legs stronger. Past Medical History/Comorbidities:   Ms. Patricia Colunga  has a past medical history of Breast cancer ([facility] [address] CAD (coronary artery disease), GERD (gastroesophageal reflux disease), Hiatal hernia, High triglycerides, History of heart artery stent, History of kidney problems, and Hypertension. Ms. Patricia Colunga  has a past surgical history that includes Coronary angioplasty with stent ();  Vein Surgery; Hysterectomy; Coronary artery bypass graft; Rotator cuff repair (Right); Hysterectomy; Mastectomy (Left, 1990); and hip surgery (Left, 6/16/2022). Social History/Living Environment:   Lives With: Daughter  Type of Home: Mobile home  Home Layout: One level  Home Access: Stairs to enter with rails  Entrance Stairs - Number of Steps: 6  Home Equipment: Cane     Prior Level of Function/Work/Activity:   Occupation: Retired  Receives Help From: Family  ADL Assistance: Independent  Homemaking Assistance: Independent  Ambulation Assistance: Independent  Transfer Assistance: Independent  Active : Yes  Mode of Transportation: Car  No data recorded   Learning:   Does the patient/guardian have any barriers to learning?: No barriers  Will there be a co-learner?: No  What is the preferred language of the patient/guardian?: English  Is an  required?: No  How does the patient/guardian prefer to learn new concepts?: Listening; Reading; Demonstration     Fall Risk Scale: Total Score: 60  Call Fall Risk: High (45 and higher)     Dominant Side:  right handed    Personal Factors:        Sex:  female        Age:  80 y.o. OBJECTIVE   Observation/Orthostatic Postural Assessment: Forward head/rounded shoulders posture. Functional Mobility:         Gait/Ambulation:  Patient ambulates with a single point cane demonstrating decreased gait speed. Strength:          Hip flexion 4-/5, hip abduction 4/5, hip adduction 4/5, quadriceps 4+/5, hamstrings 4-/5, ankle dorsiflexion 4-/5, ankle plantarflexion 4/5. Sensation:         Within normal limits. Postural Control & Balance:  · Conde Balance Scale:  37/56.   (A score less than 45/56 indicates high risk of falls)    ASSESSMENT   Initial Assessment:  Ms. Pressley List presents with increased weakness, imbalance, and difficulty walking. Patient is at higher fall risk based on history of falls and score received on Conde Balance Scale.   Patient would benefit from skilled physical therapy to address problems and goals. Thank you for this referral.      Discharge note:  Patient attended five scheduled physical therapy appointments from 4/25/2022 to 6/14/2022. Patient fell and fractured left hip on 6/15/2022. Patient admitted to hospital for surgery. Patient scheduled to be transferred to [facility] for short term rehab. Problems and goals unable to be reassessed. Patient discharged from physical therapy at this time. Thank you for this referral.        Problem List: (Impacting functional limitations):    1. Decreased Strength  2. Decreased Ambulation Ability/Technique  3. Decreased Balance  4. Decreased [place] with Home Exercise Program  Therapy Prognosis:   Therapy Prognosis: Excellent     Assessment Complexity:   Medium Complexity  PLAN   Effective Dates:  4/25/2022 TO Plan of Care/Certification Expiration Date: 07/24/22     Frequency/Duration:  Patient discharged from physical therapy. Interventions Planned (Treatment may consist of any combination of the following):    1. Balance Exercise  2. Gait Training  3. Home Exercise Program (HEP)  4. Therapeutic Exercise/Strengthening  Goals: (Goals have been discussed and agreed upon with patient.)  Short-Term Functional Goals: Time Frame: 30 days  1. Patient will be independent with home exercise to improve balance and strength. Goal met. 2.  Patient will score less than or equal to 15 seconds on Timed Up and Go Test indicating improved gait speed. Goal met. Discharge Goals: Time Frame: 90 days  1. Patient will score less than or equal to 12 seconds on Timed Up and Go Test indicating improved gait speed. Goal unable to be reassessed. 2.  Patient will score greater than or equal to 46/56 on Conde Balance Scale indicating improved balance and decreased risk of falls with daily activities. Goal unable to be reassessed. 3.  Patient will report improved ease getting up into her daughter's truck. Goal unable to be reassessed. Outcome Measure: Tool Used: Conde Balance Scale  Score:  Initial: 37/56 Most Recent: X/56 (Date: -- )   Interpretation of Score: Each section is scored on a 0-4 scale, 0 representing the patients inability to perform the task and 4 representing independence. The scores of each section are added together for a total score of 56. The higher the patients score, the more independent the patient is. Any score below 45 indicates increased risk for falls. Tool Used: Timed Up and Go (TUG)  Score:  Initial: 18.5 seconds Most Recent: 14.4 seconds (Date: 6- )   Interpretation of Score: The test measures, in seconds, the time taken by an individual to stand up from a standard arm chair (seat height 46 cm [18 in], arm height 65 cm [25.6 in]), walk a distance of 3 meters (118 in, approx 10 ft), turn, walk back to the chair and sit down. If the individual takes longer than 14 seconds to complete TUG, this indicates risk for falls.         Thank you for this referral,  ABEBA Valenzuela, PT       Post Session Pain  Charge Capture  PT Visit Info  POC Link  Treatment Note Link  MD Guidelines  Luis Alfredo

## 2022-06-24 ENCOUNTER — CARE COORDINATION (OUTPATIENT)
Dept: CARE COORDINATION | Facility: CLINIC | Age: 87
End: 2022-06-24

## 2022-06-24 NOTE — CARE COORDINATION
Post-Acute Facility Update [facility]    2022    Patient: Francoise Man   Patient : 1933   MRN: [id number]    Reason for Admission: Left JOVANY  Discharge Date: 22      Care Transitions Post Acute Facility Update    Care Transitions Interventions  Post Acute Facility Update  Reported Nursing Issues: Admitted on 22 s/p fall with Lt hip fx with ORIF. Simvastatin decreased to 20mg at bedtime. V/S stable, reg diet, medically stable. ADLs: Maximum Assistance (Comment: Admitted on 22 s/p fall with Lt hip fx with ORIF. Simvastatin decreased to 20mg at bedtime.)   Transfer Assistance: Moderate Assistance   Ambulation Assistance: Moderate Assistance   How far (in feet) is the patient ambulating?: 8   Does patient use an assistive device?: Yes (Comment: KUNAL)   Assistive Devices: RW   Anticipated discharge services: Patient plans to discharge back home upon the completion of Short-Term Rehab.  [facility] services at discharge

## 2022-06-28 ENCOUNTER — APPOINTMENT (OUTPATIENT)
Dept: PHYSICAL THERAPY | Age: 87
End: 2022-06-28
Payer: MEDICARE

## 2022-06-28 ENCOUNTER — OFFICE VISIT (OUTPATIENT)
Dept: ORTHOPEDIC SURGERY | Age: 87
End: 2022-06-28

## 2022-06-28 DIAGNOSIS — S72.002A CLOSED DISPLACED FRACTURE OF LEFT FEMORAL NECK (HCC): Primary | ICD-10-CM

## 2022-06-28 PROCEDURE — 99024 POSTOP FOLLOW-UP VISIT: CPT | Performed by: PHYSICIAN ASSISTANT

## 2022-06-28 NOTE — PROGRESS NOTES
[facility]            Patient ID:  Name: Taya Guzman  AGE/Gender: 80 y.o. female  MRN: [id number]  : 1933    Date of Service: 2022          ALLERGIES:   Allergies   Allergen Reactions    Penicillins Hives          History:  The patient is seen today for follow-up. The patient sustained  a left Hip fracture and underwent ORIF at [facility].  They are doing well with regard to the hip,  having very little discomfort or pain. They have no other complaints or concerns: The patient has been progressing with physical therapy. Physical Exam:       General:  On exam the patient is a pleasant 80 y.o. female in no acute distress, A&O x 3. Hip: This incision is healing there is swelling consistent with the postoperative time frame. There is no drainage. ROM not assessed. The calf is soft and non-tender. Assessment and Plan:   The incision is healing. I removed the staples and applied steri strips. OK to bathe. WBAT with walker. The patient was advised to notify us if drainage returns. We will follow up in 4 weeks or sooner if needed.        Electronically signed by:   DRU Morales PA  2022,  11:17 AM

## 2022-06-28 NOTE — PATIENT INSTRUCTIONS
[facility]      IF YOU HAVE ANY PROBLEMS ONCE YOU ARE AT HOME CALL THE FOLLOWING NUMBERS:   Main office number: [phone] ask for Lu Gonzalez (medical assistant with Dr. Shital García)  Office Address: [facility] [address] [address] [address] [facility] [place], [address] [facility]      Patient Discharge Instructions    Hilda Prajapati / [id number] : 1933    Admitted [unfilled] Discharged: 2022         To be given to [address] on Admission               Weight bearing status:  As tolerated with walker and assistance     Activity  · Continue Physical Therapy and Occupational Therapy   · Fall precautions     Wound Care   Staples removed and steri-strips applied   Notify if drainage returns    Diet  · Resume regular or diabetic diet      Follow up    Follow up in our office in 4 weeks with Dr. Shital García.  All patients are to be transported via stretcher unless they are able to independently get out of a chair and stand without assistance. no

## 2022-06-30 ENCOUNTER — APPOINTMENT (OUTPATIENT)
Dept: PHYSICAL THERAPY | Age: 87
End: 2022-06-30
Payer: MEDICARE

## 2022-07-06 ENCOUNTER — CARE COORDINATION (OUTPATIENT)
Dept: CARE COORDINATION | Facility: CLINIC | Age: 87
End: 2022-07-06

## 2022-07-06 NOTE — CARE COORDINATION
[facility] Update Call  [facility]    2022    Patient: Stephy Billingsley   Patient : 1933   MRN: [id number]    Reason for Admission: Left JOVANY  Discharge Date: 22      Care Transitions Post Acute Facility Update    Care Transitions Interventions  Post Acute Facility Update  Reported Nursing Issues: UA/C&S and labs of CBC/BNP d/t dysuria and lower extremity edema. Staples removed at the orthopedic appointment. V/S stable, regular diet     ADLs: Moderate Assistance (Comment: Set up for UB self care and mod assist for LB self care.)   Bed Mobility: Minimal Assistance   Transfer Assistance: Minimal Assistance   Ambulation Assistance: Minimal Assistance   How far (in feet) is the patient ambulating?: 15   Does patient use an assistive device?: Yes (Comment: RW)   Assistive Devices: RW   Anticipated discharge services: Patient plans to discharge back home upon the completion of Short-Term Rehab.

## 2022-07-11 ENCOUNTER — CARE COORDINATION (OUTPATIENT)
Dept: CARE COORDINATION | Facility: CLINIC | Age: 87
End: 2022-07-11

## 2022-07-11 NOTE — CARE COORDINATION
[facility] Update Call - [facility]    2022    Patient: Regan Parekh   Patient : 1933   MRN: [id number]    Reason for Admission: Left JOVANY  Discharge Date: 22      Care Transitions Post Acute Facility Update    Care Transitions Interventions  Post Acute Facility Update  Reported Nursing Issues: UA results klebsiella pneumoniae bacteria, started on Cipro 250mg PO x 5 days.  V/S stable, no change in diet     ADLs: Moderate Assistance (Comment: Set up for UB self care and mod assist for LB self care.)   Bed Mobility: Contact Guard Assist - Hands on patient for balance   Transfer Assistance: [address] on patient for balance   Ambulation Assistance: [address] on patient for balance   How far (in feet) is the patient ambulating?: 50   Does patient use an assistive device?: Yes (Comment: RW)   Assistive Devices: RW   Anticipated discharge services: Patient plans to discharge back home upon the completion of Short-Term Rehab w/ [facility] services

## 2022-07-18 ENCOUNTER — CARE COORDINATION (OUTPATIENT)
Dept: CARE COORDINATION | Facility: CLINIC | Age: 87
End: 2022-07-18

## 2022-07-18 NOTE — CARE COORDINATION
[facility] Update Call    2022    Patient: Laquita Santoyo Patient : 1933   MRN: [id number]  Reason for Admission:JOVANY  Discharge Date: 22        Care Transitions Post Acute Facility Update    Care Transitions Interventions  Post Acute Facility Update  Reported Nursing Issues: V/S stable, no new orders. ADLs: Moderate Assistance (Comment: UB self care SBA, LB self care min to mod assist.)   Bed Mobility: Contact Guard Assist - Hands on patient for balance   Transfer Assistance: [address] on patient for balance   Ambulation Assistance: [address] on patient for balance   How far (in feet) is the patient ambulating?: 60   Does patient use an assistive device?: Yes (Comment: KUNAL)   Assistive Devices: RW   Anticipated date for discharge: 22    Anticipated discharge services: Plan is for the patient to stay short-term and to return home with her daughter to their 1 story home with 5 steps to enter. She needs assistance with ADL's. Her DME includes a cane and there is no Home Health preference.

## 2022-07-22 ENCOUNTER — TELEPHONE (OUTPATIENT)
Dept: ORTHOPEDIC SURGERY | Age: 87
End: 2022-07-22

## 2022-07-22 ENCOUNTER — CARE COORDINATION (OUTPATIENT)
Dept: CARE COORDINATION | Facility: CLINIC | Age: 87
End: 2022-07-22

## 2022-07-22 NOTE — TELEPHONE ENCOUNTER
She will be discharged Monday and home health has been ordered for OT and PT. Will Dr. Maura Brown sign the home health orders? Please let her know.

## 2022-07-26 ENCOUNTER — CARE COORDINATION (OUTPATIENT)
Dept: CARE COORDINATION | Facility: CLINIC | Age: 87
End: 2022-07-26

## 2022-07-27 ENCOUNTER — CARE COORDINATION (OUTPATIENT)
Dept: CARE COORDINATION | Facility: CLINIC | Age: 87
End: 2022-07-27

## 2022-07-27 NOTE — CARE COORDINATION
2nd [place] outreach attempt. LVM, provided my contact information. Resolving episode until I hear back from pt.

## 2022-08-26 ENCOUNTER — OFFICE VISIT (OUTPATIENT)
Dept: CARDIOLOGY CLINIC | Age: 87
End: 2022-08-26
Payer: MEDICARE

## 2022-08-26 VITALS
DIASTOLIC BLOOD PRESSURE: 50 MMHG | HEIGHT: 61 IN | HEART RATE: 60 BPM | WEIGHT: 149 LBS | SYSTOLIC BLOOD PRESSURE: 102 MMHG | BODY MASS INDEX: 28.13 KG/M2

## 2022-08-26 DIAGNOSIS — I25.118 CORONARY ARTERY DISEASE OF NATIVE ARTERY OF NATIVE HEART WITH STABLE ANGINA PECTORIS (HCC): Primary | ICD-10-CM

## 2022-08-26 DIAGNOSIS — I10 ESSENTIAL HYPERTENSION: ICD-10-CM

## 2022-08-26 DIAGNOSIS — I35.8 AORTIC VALVE SCLEROSIS: ICD-10-CM

## 2022-08-26 DIAGNOSIS — D62 POSTOPERATIVE ANEMIA DUE TO ACUTE BLOOD LOSS: ICD-10-CM

## 2022-08-26 DIAGNOSIS — I89.0 LYMPHEDEMA: ICD-10-CM

## 2022-08-26 PROCEDURE — G8427 DOCREV CUR MEDS BY ELIG CLIN: HCPCS | Performed by: INTERNAL MEDICINE

## 2022-08-26 PROCEDURE — G8417 CALC BMI ABV UP PARAM F/U: HCPCS | Performed by: INTERNAL MEDICINE

## 2022-08-26 PROCEDURE — 99214 OFFICE O/P EST MOD 30 MIN: CPT | Performed by: INTERNAL MEDICINE

## 2022-08-26 PROCEDURE — 1090F PRES/ABSN URINE INCON ASSESS: CPT | Performed by: INTERNAL MEDICINE

## 2022-08-26 PROCEDURE — 1123F ACP DISCUSS/DSCN MKR DOCD: CPT | Performed by: INTERNAL MEDICINE

## 2022-08-26 PROCEDURE — 1036F TOBACCO NON-USER: CPT | Performed by: INTERNAL MEDICINE

## 2022-08-26 NOTE — PATIENT INSTRUCTIONS
Patient Education        Learning About the Mediterranean Diet  What is the [address]? The Mediterranean diet is a style of eating rather than a diet plan. It features foods eaten in [place], [place], [place] and [place], and other countries along the [facility]. It emphasizes eating foods like fish, fruits, vegetables, beans, high-fiber breads and whole grains, nuts, and oliveoil. This style of eating includes limited red meat, cheese, and sweets. Why choose the Mediterranean diet? A Mediterranean-style diet may improve heart health. It contains more fat than other heart-healthy diets. But the fats are mainly from nuts, unsaturated oils (such as fish oils and olive oil), and certain nut or seed oils (such as canola, soybean, or flaxseed oil). These fats may help protect the heart andblood vessels. How can you get started on the Mediterranean diet? Here are some things you can do to switch to a more Mediterranean way of eating. What to eat  Eat a variety of fruits and vegetables each day, such as grapes, blueberries, tomatoes, broccoli, peppers, figs, olives, spinach, eggplant, beans, lentils, and chickpeas. Eat a variety of whole-grain foods each day, such as oats, brown rice, and whole wheat bread, pasta, and couscous. Eat fish at least 2 times a week. Try tuna, salmon, mackerel, lake trout, herring, or sardines. Eat moderate amounts of low-fat dairy products, such as milk, cheese, or yogurt. Eat moderate amounts of poultry and eggs. Choose healthy (unsaturated) fats, such as nuts, olive oil, and certain nut or seed oils like canola, soybean, and flaxseed. Limit unhealthy (saturated) fats, such as butter, palm oil, and coconut oil. And limit fats found in animal products, such as meat and dairy products made with whole milk. Try to eat red meat only a few times a month in very small amounts. Limit sweets and desserts to only a few times a week.  This includes sugar-sweetened drinks like soda. The Mediterranean diet may also include red wine with your meal--1 glass eachday for women and up to 2 glasses a day for men. Tips for eating at home  Use herbs, spices, garlic, lemon zest, and citrus juice instead of salt to add flavor to foods. Add avocado slices to your sandwich instead of colon. Have fish for lunch or dinner instead of red meat. Brush the fish with olive oil, and broil or grill it. Sprinkle your salad with seeds or nuts instead of cheese. Cook with olive or canola oil instead of butter or oils that are high in saturated fat. Switch from 2% milk or whole milk to 1% or fat-free milk. Dip raw vegetables in a vinaigrette dressing or hummus instead of dips made from mayonnaise or sour cream.  Have a piece of fruit for dessert instead of a piece of cake. Try baked apples, or have some dried fruit. Tips for eating out  Try broiled, grilled, baked, or poached fish instead of having it fried or breaded. Ask your  to have your meals prepared with olive oil instead of butter. Order dishes made with marinara sauce or sauces made from olive oil. Avoid sauces made from cream or mayonnaise. Choose whole-grain breads, whole wheat pasta and pizza crust, brown rice, beans, and lentils. Cut back on butter or margarine on bread. Instead, you can dip your bread in a small amount of olive oil. Ask for a side salad or grilled vegetables instead of french fries or chips. Where can you learn more? Go to https://IP Ghosterelainaeb.Haodf.com. org and sign in to your [employer] account. Enter [phone] in the State mental health facility box to learn more about \"Learning About the Mediterranean Diet. \"     If you do not have an account, please click on the \"Sign Up Now\" link. Current as of: September 8, 2021               Content Version: 13.3  © 2594-4452 [employer], [employer]. Care instructions adapted under license by [facility] ([facility]).  If you have questions about a medical condition or this instruction, always ask your healthcare professional. [place] [address] any warranty or liability for your use of this information.

## 2022-08-26 NOTE — PROGRESS NOTES
[facility] CARDIOLOGY  [address], [address]  PHONE: [phone]      22    NAME:  Juan Tinsley  : 1933  MRN: [id number]         SUBJECTIVE:   Juan Tinsley is a 80 y.o. female seen for a follow up visit regarding the following:     Chief Complaint   Patient presents with    Coronary Artery Disease     6 mo              HPI:  Follow up  Coronary Artery Disease (6 mo)   . Follow up CAD/prior CABG and stents, hypertension, CKD, dyslipidemia with stable angina. Suffered a hip fracture following a non syncopal fall (or causing it) in . Had hip replacement, required transfusion for hgb 6.9, discharge hgb 7.8 and back to 10.4 on . She has chronic vertigo when she first lies down at night. She will have mild light headedness sometimes when she first rises but minimal and hasn't caused her concern. Throughout her ordeal she's not had angina. Continues with LE lymphedema       Past cardiac history:   CABG 2005 in [facility] with subsequent coronary stents twice in her local hospital in [place], [place]. Mar 2019- Echo normal SF and DF, AV sclerosis without stenosis   Oct 2019- normal 24 hour monitor          [place] CAD CHF Meds            amLODIPine (NORVASC) 10 MG tablet (Taking)    Class: Historical Med    metoprolol succinate (TOPROL XL) 25 MG extended release tablet (Taking)    Class: Historical Med    simvastatin (ZOCOR) 40 MG tablet (Taking)    Class: Historical Med              Past Medical History, Past Surgical History, Family history, Social History, and Medications were all reviewed with the patient today and updated as necessary. Prior to Admission medications    Medication Sig Start Date End Date Taking?  Authorizing Provider   Apoaequorin (PREVAGEN PO) Take by mouth   Yes Historical Provider, MD   Misc Natural Products (LUTEIN 20 PO) Take by mouth   Yes Historical Provider, MD   clonazePAM (KLONOPIN) 0.5 MG tablet Take 1 tablet by mouth nightly as needed for Anxiety (or sleep) for up to 3 days.  6/21/22 8/26/22 Yes Doc Sewell MD   aspirin 325 MG EC tablet Take 1 tablet by mouth daily 6/22/22  Yes Doc Sewell MD   CALCIUM PO Take by mouth   Yes Ar Automatic Reconciliation   amLODIPine (NORVASC) 10 MG tablet Take by mouth daily   Yes Ar Automatic Reconciliation   ascorbic acid (VITAMIN C) 500 MG tablet Take by mouth   Yes Ar Automatic Reconciliation   vitamin D 25 MCG (1000 UT) CAPS Take by mouth daily   Yes Ar Automatic Reconciliation   esomeprazole (NEXIUM) 40 MG delayed release capsule TAKE 1 CAPSULE BY MOUTH DAILY 2/1/21  Yes Ar Automatic Reconciliation   isosorbide mononitrate (IMDUR) 60 MG extended release tablet Take 60 mg by mouth 11/11/21  Yes Ar Automatic Reconciliation   meclizine (ANTIVERT) 25 MG tablet Take by mouth 3 times daily as needed   Yes Ar Automatic Reconciliation   metoprolol succinate (TOPROL XL) 25 MG extended release tablet Take 25 mg by mouth daily 11/6/20  Yes Ar Automatic Reconciliation   nitroGLYCERIN (NITROSTAT) 0.4 MG SL tablet Place 0.4 mg under the tongue 11/11/21  Yes Ar Automatic Reconciliation   simvastatin (ZOCOR) 40 MG tablet Take 40 mg by mouth 11/6/20  Yes Ar Automatic Reconciliation     Allergies   Allergen Reactions    Penicillins Hives     Past Medical History:   Diagnosis Date    Breast cancer ([facility] [address]     left s/p chemo and mastectomy 1990    CAD (coronary artery disease)     GERD (gastroesophageal reflux disease)     Hiatal hernia     High triglycerides     History of heart artery stent     x 2    History of kidney problems     Hypertension      Past Surgical History:   Procedure Laterality Date    CORONARY ANGIOPLASTY WITH STENT PLACEMENT  2005    x 2    CORONARY ARTERY BYPASS GRAFT      HIP SURGERY Left 6/16/2022    LEFT HIP HEMIARTHROPLASTY/ CHOICE performed by Fabiola Heck MD at [facility] MAIN OR    HYSTERECTOMY (CERVIX STATUS UNKNOWN)      HYSTERECTOMY (CERVIX STATUS UNKNOWN) MASTECTOMY Left 1990    ROTATOR CUFF REPAIR Right     VEIN SURGERY       Family History   Problem Relation Age of Onset    Hypertension Mother     Stroke Mother     Coronary Art Dis Father      Social History     Tobacco Use    Smoking status: Former     Types: Cigarettes     Quit date: 1988     Years since quittin.6    Smokeless tobacco: Never   Substance Use Topics    Alcohol use: No       ROS:    Review of Systems   Cardiovascular:  Positive for leg swelling. Negative for chest pain and near-syncope. PHYSICAL EXAM:   BP (!) 102/50   Pulse 60   Ht 5' 1\" (1.549 m)   Wt 149 lb (67.6 kg)   BMI 28.15 kg/m²      Wt Readings from Last 3 Encounters:   22 149 lb (67.6 kg)   22 143 lb (64.9 kg)   04/15/22 147 lb 3.2 oz (66.8 kg)     BP Readings from Last 3 Encounters:   22 (!) 102/50   22 (!) 137/56   04/15/22 (!) 145/68     Pulse Readings from Last 3 Encounters:   22 60   22 79   04/15/22 66           Physical Exam  Constitutional:       Appearance: Normal appearance. HENT:      Head: Normocephalic and atraumatic. Eyes:      Conjunctiva/sclera: Conjunctivae normal.   Neck:      Vascular: No carotid bruit. Cardiovascular:      Rate and Rhythm: Normal rate and regular rhythm. Heart sounds: No murmur heard. No friction rub. No gallop. Pulmonary:      Effort: No respiratory distress. Breath sounds: No wheezing or rales. Musculoskeletal:         General: Swelling (2-3+, involves toes) present. Cervical back: Neck supple. Skin:     General: Skin is warm and dry. Neurological:      General: No focal deficit present. Mental Status: She is alert. Psychiatric:         Mood and Affect: Mood normal.         Behavior: Behavior normal.       Medical problems and test results were reviewed with the patient today.      DATA REVIEW    Lab Results   Component Value Date    WBC 11.4 (H) 2022    HGB 7.8 (L) 2022    HCT 23.5 (L) 06/21/2022    MCV 95.2 06/20/2022     06/20/2022       BMP  Lab Results   Component Value Date/Time     06/16/2022 03:15 AM    K 4.0 06/16/2022 03:15 AM     06/16/2022 03:15 AM    CO2 27 06/16/2022 03:15 AM    BUN 26 06/16/2022 03:15 AM    CREATININE 0.90 06/16/2022 03:15 AM    GLUCOSE 94 06/16/2022 03:15 AM    CALCIUM 9.0 06/16/2022 03:15 AM      03/08/2022        Cholesterol 120   Triglycerides 104   HDL Cholesterol 46   VLDL Cholesterol 19   LDL, Calculated 55   LDL/HDL Ratio 1.2       EKG    Jun 2022- SR, 85, pac otherwise normal    CXR/IMAGING        DEVICE INTERROGATION        OUTSIDE RECORDS REVIEW    Records from outside providers have been reviewed and summarized as noted in the HPI, past history and data review sections of this note, and reviewed with patient. .       ASSESSMENT and PLAN    Vijaya Whitman was seen today for coronary artery disease. Diagnoses and all orders for this visit:    Coronary artery disease of native artery of native heart with stable angina pectoris [facility])    Essential hypertension    Aortic valve sclerosis    Lymphedema    Postoperative anemia due to acute blood loss        IMPRESSION:    Surprisingly no angina throughout difficult ordeal and severe anemia which is improved. Discussed reducing isosorbide with low-marco blood pressure. She is reticent to change it d/t prior angina and will stay the course Admits she's not  good about hydrating and will work on this    Lymphedema, can't get compression socks on d/t arthritis. Wrote Rx for velcro wraps, offered referral if desired    Benign av sclerosis, no change on exam        Return in about 6 months (around 2/26/2023). Thank you for allowing me to participate in this patient's care. Please call or contact me if there are any questions or concerns regarding the above.       Odette Larson MD  08/26/22  1:49 PM

## 2022-09-13 ENCOUNTER — OFFICE VISIT (OUTPATIENT)
Dept: ORTHOPEDIC SURGERY | Age: 87
End: 2022-09-13

## 2022-09-13 DIAGNOSIS — S72.002A CLOSED DISPLACED FRACTURE OF LEFT FEMORAL NECK (HCC): Primary | ICD-10-CM

## 2022-09-13 PROCEDURE — 99024 POSTOP FOLLOW-UP VISIT: CPT | Performed by: ORTHOPAEDIC SURGERY

## 2022-09-13 NOTE — PROGRESS NOTES
Progress Note    Patient: Jessica Guo MRN: [id number]  SSN: [id number]    YOB: 1933  Age: 80 y.o. Sex: female        9/13/2022      Subjective:     Patient is about 3 months out from left hip plasty for left femoral neck fracture. She seems that she is doing really well. She does states she walks with a cane before she is still using her rolling walker now and her cane little bit in the house. She seems like she has very little discomfort now with her left hip    Objective: There were no vitals filed for this visit. Physical Exam:     Skin - incision is well healed with no redness or drainage  Motor and sensory function intact in LEFT LOWER extremity  Pulses palpable in LEFT LOWER extremity     XRAY FINDINGS:  [id number] left hip intertrochanteric fracture, findings-AP and lateral views of the left hip shows the hardware is intact with no evidence of loosening or failure. The overall alignment is excellent. There does appear to be significant evidence of healing at the primary fracture lines impression-healing well aligned left intertrochanteric proximal femur fracture    Assessment:     3 months out from left hip hemiarthroplasty doing well    Plan:     I do think she seems to be doing very well now. I talked her little bit about osteoporosis management. At asked her if she think she has ever had a DEXA scan in the past.  She does not think she has ever had 1. I do think it would be reasonable for her to talk to her family doctor about whether or not I think this is appropriate what medication might be appropriate for her. She seems to be comfortable with this plan. So she will talk to them about this whenever she sees them next.   I think I can see her back now on a as needed basis    Signed By: Andrzej Traore MD     September 13, 2022

## 2022-10-20 ENCOUNTER — APPOINTMENT (RX ONLY)
Dept: URBAN - METROPOLITAN AREA CLINIC 23 | Facility: CLINIC | Age: 87
Setting detail: DERMATOLOGY
End: 2022-10-20

## 2022-10-20 DIAGNOSIS — L57.8 OTHER SKIN CHANGES DUE TO CHRONIC EXPOSURE TO NONIONIZING RADIATION: ICD-10-CM

## 2022-10-20 DIAGNOSIS — L82.1 OTHER SEBORRHEIC KERATOSIS: ICD-10-CM

## 2022-10-20 DIAGNOSIS — Z71.89 OTHER SPECIFIED COUNSELING: ICD-10-CM

## 2022-10-20 PROCEDURE — 99203 OFFICE O/P NEW LOW 30 MIN: CPT

## 2022-10-20 PROCEDURE — ? COUNSELING

## 2022-10-20 ASSESSMENT — LOCATION DETAILED DESCRIPTION DERM
LOCATION DETAILED: RIGHT LATERAL UPPER BACK
LOCATION DETAILED: LEFT INFERIOR UPPER BACK
LOCATION DETAILED: RIGHT SUPERIOR LATERAL MIDBACK
LOCATION DETAILED: LEFT SUPERIOR UPPER BACK
LOCATION DETAILED: RIGHT INFERIOR MEDIAL FOREHEAD

## 2022-10-20 ASSESSMENT — LOCATION SIMPLE DESCRIPTION DERM
LOCATION SIMPLE: RIGHT UPPER BACK
LOCATION SIMPLE: RIGHT FOREHEAD
LOCATION SIMPLE: LEFT UPPER BACK
LOCATION SIMPLE: RIGHT LOWER BACK

## 2022-10-20 ASSESSMENT — LOCATION ZONE DERM
LOCATION ZONE: TRUNK
LOCATION ZONE: FACE

## 2023-02-17 DIAGNOSIS — M25.552 LEFT HIP PAIN: Primary | ICD-10-CM

## 2023-02-22 NOTE — PROGRESS NOTES
[address], [address] [address]  [facility], [address] [place] [address]  PHONE: [phone]      23    NAME:  Neo Stokes  : 1933  MRN: [id number]         SUBJECTIVE:   Neo Stokes is a 80 y.o. female seen for a follow up visit regarding the following:     Chief Complaint   Patient presents with    6 Month Follow-Up    Coronary Artery Disease              HPI:  Follow up  6 Month Follow-Up and Coronary Artery Disease   . Follow up CAD/prior CABG and stents, hypertension, CKD, dyslipidemia with stable angina. LE lymphedema no anginal quality chest pain or exertional dyspnea. Still doing some therapy exercises having suffered a broken hip last fall, ended up with a JOVANY. Past cardiac history:   CABG 2005 in [facility] with subsequent coronary stents twice in her local hospital in [place], [place]. Mar 2019- Echo normal SF and DF, AV sclerosis without stenosis   Oct 2019- normal 24 hour monitor             [place] CAD CHF Meds            amLODIPine (NORVASC) 10 MG tablet (Taking)    Class: Historical Med    metoprolol succinate (TOPROL XL) 25 MG extended release tablet (Taking)    Class: Historical Med    simvastatin (ZOCOR) 40 MG tablet (Taking)    Class: Historical Med          Key Antihyperglycemic Medications       Patient is on no antihyperglycemic meds. Past Medical History, Past Surgical History, Family history, Social History, and Medications were all reviewed with the patient today and updated as necessary. Prior to Admission medications    Medication Sig Start Date End Date Taking?  Authorizing Provider   oxybutynin (DITROPAN-XL) 5 MG extended release tablet Take 5 mg by mouth daily 23  Yes Historical Provider, MD   Apoaequorin (PREVAGEN PO) Take by mouth   Yes Historical Provider, MD   Misc Natural Products (LUTEIN 20 PO) Take by mouth   Yes Historical Provider, MD   clonazePAM (KLONOPIN) 0.5 MG tablet Take 1 tablet by mouth nightly as needed for Anxiety (or sleep) for up to 3 days.  6/21/22 2/23/23 Yes Cathy Klein MD   aspirin 325 MG EC tablet Take 1 tablet by mouth daily 6/22/22  Yes Cathy Klein MD   CALCIUM PO Take by mouth   Yes Ar Automatic Reconciliation   amLODIPine (NORVASC) 10 MG tablet Take by mouth daily   Yes Ar Automatic Reconciliation   ascorbic acid (VITAMIN C) 500 MG tablet Take by mouth   Yes Ar Automatic Reconciliation   vitamin D 25 MCG (1000 UT) CAPS Take by mouth daily   Yes Ar Automatic Reconciliation   esomeprazole (NEXIUM) 40 MG delayed release capsule TAKE 1 CAPSULE BY MOUTH DAILY 2/1/21  Yes Ar Automatic Reconciliation   isosorbide mononitrate (IMDUR) 60 MG extended release tablet Take 60 mg by mouth 11/11/21  Yes Ar Automatic Reconciliation   meclizine (ANTIVERT) 25 MG tablet Take by mouth 3 times daily as needed   Yes Ar Automatic Reconciliation   metoprolol succinate (TOPROL XL) 25 MG extended release tablet Take 50 mg by mouth daily 11/6/20  Yes Ar Automatic Reconciliation   nitroGLYCERIN (NITROSTAT) 0.4 MG SL tablet Place 0.4 mg under the tongue 11/11/21  Yes Ar Automatic Reconciliation   simvastatin (ZOCOR) 40 MG tablet Take 40 mg by mouth 11/6/20  Yes Ar Automatic Reconciliation   Omega-3 Fatty Acids (FISH OIL) 1000 MG capsule Take 1 capsule by mouth daily    Historical Provider, MD     Allergies   Allergen Reactions    Penicillins Hives     Past Medical History:   Diagnosis Date    Breast cancer ([facility] [address]     left s/p chemo and mastectomy 1990    CAD (coronary artery disease)     GERD (gastroesophageal reflux disease)     Hiatal hernia     High triglycerides     History of heart artery stent     x 2    History of kidney problems     Hypertension      Past Surgical History:   Procedure Laterality Date    CORONARY ANGIOPLASTY WITH STENT PLACEMENT  2005    x 2    CORONARY ARTERY BYPASS GRAFT      HIP SURGERY Left 6/16/2022    LEFT HIP HEMIARTHROPLASTY/ CHOICE performed by Ashwin Yan MD at [facility] MAIN OR    HYSTERECTOMY (CERVIX STATUS UNKNOWN)      HYSTERECTOMY (CERVIX STATUS UNKNOWN)      MASTECTOMY Left 1990    ROTATOR CUFF REPAIR Right     VEIN SURGERY       Family History   Problem Relation Age of Onset    Hypertension Mother     Stroke Mother     Coronary Art Dis Father      Social History     Tobacco Use    Smoking status: Former     Types: Cigarettes     Quit date: 1988     Years since quittin.1    Smokeless tobacco: Never   Substance Use Topics    Alcohol use: No       ROS:    Review of Systems   Cardiovascular:  Negative for chest pain. Respiratory:  Negative for shortness of breath. Musculoskeletal:  Positive for arthritis and joint pain. PHYSICAL EXAM:   BP (!) 168/72   Pulse 68   Ht 5' 1\" (1.549 m)   Wt 143 lb 3.2 oz (65 kg)   BMI 27.06 kg/m²      Wt Readings from Last 3 Encounters:   23 143 lb 3.2 oz (65 kg)   22 149 lb (67.6 kg)   22 143 lb (64.9 kg)     BP Readings from Last 3 Encounters:   23 (!) 168/72   22 (!) 102/50   22 (!) 137/56     Pulse Readings from Last 3 Encounters:   23 68   22 60   22 79           Physical Exam  Constitutional:       Appearance: Normal appearance. HENT:      Head: Normocephalic and atraumatic. Eyes:      Conjunctiva/sclera: Conjunctivae normal.   Neck:      Vascular: No carotid bruit. Cardiovascular:      Rate and Rhythm: Normal rate and regular rhythm. Heart sounds: No murmur heard. No friction rub. No gallop. Pulmonary:      Effort: No respiratory distress. Breath sounds: No wheezing or rales. Musculoskeletal:         General: Swelling (chronic lymphedema) present. Cervical back: Neck supple. Skin:     General: Skin is warm and dry. Neurological:      General: No focal deficit present. Mental Status: She is alert.    Psychiatric:         Mood and Affect: Mood normal.         Behavior: Behavior normal.       Medical problems and test results were reviewed with the patient today. DATA REVIEW      BMP  Lab Results   Component Value Date/Time     06/16/2022 03:15 AM    K 4.0 06/16/2022 03:15 AM     06/16/2022 03:15 AM    CO2 27 06/16/2022 03:15 AM    BUN 26 06/16/2022 03:15 AM    CREATININE 0.90 06/16/2022 03:15 AM    GLUCOSE 94 06/16/2022 03:15 AM    CALCIUM 9.0 06/16/2022 03:15 AM       7/29/22 1012    Cholesterol 100 - 199 mg/dL 144    Triglycerides 0 - 149 mg/dL 98    HDL Cholesterol >39 mg/dL 56    VLDL Cholesterol 5 - 40 mg/dL 18    LDL, Calculated 0 - 99 mg/dL 70    LDL/HDL Ratio 0.0 - 3.2 ratio 1.3        EKG        CXR/IMAGING        DEVICE INTERROGATION        OUTSIDE RECORDS REVIEW    Records from outside providers have been reviewed and summarized as noted in the HPI, past history and data review sections of this note, and reviewed with patient. .       ASSESSMENT and PLAN    Cordelia Quispe was seen today for 6 month follow-up and coronary artery disease. Diagnoses and all orders for this visit:    Coronary artery disease of native artery of native heart with stable angina pectoris ([facility] [address]    Essential hypertension    Dyslipidemia        IMPRESSION:    No angina, continue meds and lifestyle modification    BP typically at target, continue to follow on current meds, accidentally went to wrong office earlier, flustered. BP typically < 130's/60's    Lipids at goal, continue meds        Return in about 1 year (around 2/23/2024). Thank you for allowing me to participate in this patient's care. Please call or contact me if there are any questions or concerns regarding the above.       Yolanda Marie MD  02/23/23  1:59 PM

## 2023-02-23 ENCOUNTER — OFFICE VISIT (OUTPATIENT)
Dept: CARDIOLOGY CLINIC | Age: 88
End: 2023-02-23
Payer: MEDICARE

## 2023-02-23 VITALS
WEIGHT: 143.2 LBS | HEIGHT: 61 IN | BODY MASS INDEX: 27.03 KG/M2 | SYSTOLIC BLOOD PRESSURE: 168 MMHG | DIASTOLIC BLOOD PRESSURE: 72 MMHG | HEART RATE: 68 BPM

## 2023-02-23 DIAGNOSIS — I25.118 CORONARY ARTERY DISEASE OF NATIVE ARTERY OF NATIVE HEART WITH STABLE ANGINA PECTORIS (HCC): Primary | ICD-10-CM

## 2023-02-23 DIAGNOSIS — E78.5 DYSLIPIDEMIA: ICD-10-CM

## 2023-02-23 DIAGNOSIS — I10 ESSENTIAL HYPERTENSION: ICD-10-CM

## 2023-02-23 PROCEDURE — 99212 OFFICE O/P EST SF 10 MIN: CPT | Performed by: INTERNAL MEDICINE

## 2023-02-23 PROCEDURE — 1090F PRES/ABSN URINE INCON ASSESS: CPT | Performed by: INTERNAL MEDICINE

## 2023-02-23 PROCEDURE — G8417 CALC BMI ABV UP PARAM F/U: HCPCS | Performed by: INTERNAL MEDICINE

## 2023-02-23 PROCEDURE — G8427 DOCREV CUR MEDS BY ELIG CLIN: HCPCS | Performed by: INTERNAL MEDICINE

## 2023-02-23 PROCEDURE — G8484 FLU IMMUNIZE NO ADMIN: HCPCS | Performed by: INTERNAL MEDICINE

## 2023-02-23 PROCEDURE — 1036F TOBACCO NON-USER: CPT | Performed by: INTERNAL MEDICINE

## 2023-02-23 PROCEDURE — 1123F ACP DISCUSS/DSCN MKR DOCD: CPT | Performed by: INTERNAL MEDICINE

## 2023-02-23 RX ORDER — OXYBUTYNIN CHLORIDE 5 MG/1
5 TABLET, EXTENDED RELEASE ORAL DAILY
COMMUNITY
Start: 2023-02-17

## 2023-02-23 RX ORDER — CHLORAL HYDRATE 500 MG
1 CAPSULE ORAL DAILY
COMMUNITY

## 2023-02-23 ASSESSMENT — ENCOUNTER SYMPTOMS: SHORTNESS OF BREATH: 0

## 2023-02-23 NOTE — PATIENT INSTRUCTIONS
Patient Education        Learning About Coronary Artery Disease (CAD)  What is coronary artery disease? Coronary artery disease is a condition that occurs when plaque builds up in the arteries that bring oxygen-rich blood to your heart. Plaque is a fatty substance made of cholesterol, calcium, and other substances in the blood. This process is called hardening of the arteries, or atherosclerosis. What happens when you have coronary artery disease? Plaque may narrow the coronary arteries. Narrowed arteries cause poor blood flow. This can lead to angina symptoms such as chest pain or discomfort. If blood flow is completely blocked, you could have a heart attack. You can slow and reduce the risk of future problems by making changes in your lifestyle. These include quitting smoking and eating heart-healthy foods. Treatment, along with changes in your lifestyle, can help you live a longer and healthier life. How can you prevent coronary artery disease? Do not smoke. It may be the best thing you can do to prevent coronary artery disease. If you need help quitting, talk to your doctor about stop-smoking programs and medicines. These can increase your chances of quitting for good. Be active. Try to do moderate activity at least 2½ hours a week. Or try to do vigorous activity at least 1¼ hours a week. You may want to walk or try other activities, such as running, swimming, cycling, or playing tennis or team sports. Eat heart-healthy foods. Eat more fruits and vegetables and less food that contains saturated and trans fats. Limit alcohol, sodium, and sweets. Stay at a healthy weight. Lose weight if you need to. Manage other health problems such as diabetes, high blood pressure, and high cholesterol. How is coronary artery disease treated? Your doctor will suggest that you make lifestyle changes. For example, your doctor may ask you to eat healthy foods, quit smoking, lose extra weight, and be more active.   You will take medicines that help prevent a heart attack. Your doctor may suggest a procedure to open narrowed or blocked arteries. This is called angioplasty. Or your doctor may suggest using healthy blood vessels to create detours around narrowed or blocked arteries. This is called bypass surgery. Follow-up care is a key part of your treatment and safety. Be sure to make and go to all appointments, and call your doctor if you are having problems. It's also a good idea to know your test results and keep a list of the medicines you take. Where can you learn more? Go to http://www.woods.com/ and enter C643 to learn more about \"Learning About Coronary Artery Disease (CAD). \"  Current as of: September 7, 2022               Content Version: 13.5  © 2006-2022 Healthwise, Incorporated. Care instructions adapted under license by [facility] If you have questions about a medical condition or this instruction, always ask your healthcare professional. [address] any warranty or liability for your use of this information. Please visit [email or web address] org for more information regarding cardiovascular disease prevention and treatment.

## 2023-04-21 ENCOUNTER — OFFICE VISIT (OUTPATIENT)
Dept: CARDIOLOGY CLINIC | Age: 88
End: 2023-04-21

## 2023-04-21 VITALS
WEIGHT: 144 LBS | SYSTOLIC BLOOD PRESSURE: 156 MMHG | DIASTOLIC BLOOD PRESSURE: 66 MMHG | BODY MASS INDEX: 27.19 KG/M2 | HEIGHT: 61 IN | HEART RATE: 56 BPM

## 2023-04-21 DIAGNOSIS — R07.89 CHEST DISCOMFORT: Primary | ICD-10-CM

## 2023-04-21 DIAGNOSIS — I10 ESSENTIAL HYPERTENSION: ICD-10-CM

## 2023-04-21 DIAGNOSIS — E78.5 DYSLIPIDEMIA: ICD-10-CM

## 2023-04-21 DIAGNOSIS — I25.118 CORONARY ARTERY DISEASE OF NATIVE ARTERY OF NATIVE HEART WITH STABLE ANGINA PECTORIS (HCC): ICD-10-CM

## 2023-04-21 RX ORDER — ASPIRIN 81 MG/1
81 TABLET ORAL DAILY
COMMUNITY

## 2023-04-21 RX ORDER — NITROGLYCERIN 0.4 MG/1
0.4 TABLET SUBLINGUAL EVERY 5 MIN PRN
Qty: 25 TABLET | Refills: 5 | Status: SHIPPED | OUTPATIENT
Start: 2023-04-21

## 2023-04-21 RX ORDER — SIMVASTATIN 20 MG
20 TABLET ORAL NIGHTLY
Qty: 90 TABLET | Refills: 1 | Status: SHIPPED | OUTPATIENT
Start: 2023-04-21

## 2023-04-21 RX ORDER — MULTIVIT-MIN/FA/LYCOPEN/LUTEIN .4-300-25
1 TABLET ORAL DAILY
COMMUNITY

## 2023-04-21 RX ORDER — RANOLAZINE 500 MG/1
500 TABLET, EXTENDED RELEASE ORAL 2 TIMES DAILY
Qty: 180 TABLET | Refills: 3 | Status: SHIPPED | OUTPATIENT
Start: 2023-04-21

## 2023-04-21 NOTE — PATIENT INSTRUCTIONS
worse, or last longer. You feel dizzy or lightheaded, or you feel like you may faint. Watch closely for changes in your health, and be sure to contact your doctor if you have any problems. Where can you learn more? Go to http://www.woods.com/ and enter H129 to learn more about \"Angina: Care Instructions. \"  Current as of: September 7, 2022               Content Version: 13.6  © 2006-2023 Healthwise, [employer]. Care instructions adapted under license by [facility] If you have questions about a medical condition or this instruction, always ask your healthcare professional. [address] any warranty or liability for your use of this information. Please visit [email or web address] org for more information regarding cardiovascular disease prevention and treatment.

## 2023-04-21 NOTE — PROGRESS NOTES
[facility] CARDIOLOGY  [address], [address]  PHONE: [phone]      23    NAME:  Ata Van  : 1933  MRN: [id number]         SUBJECTIVE:   Ata Van is a 80 y.o. female seen for a follow up visit regarding the following:     Chief Complaint   Patient presents with    Chest Pain     On and off               HPI:  Follow up  Chest Pain (On and off /)   . Follow up CAD/prior CABG and stents, hypertension, CKD, dyslipidemia with stable angina. LE lymphedema, returns early having had an episode of chest discomfort 2-3 evenings ago. Sitting the night before felt discomfort,  lasted about 5 minutes so didn't take NTG, stayed up as usual.  She can't recall what she had eaten but believes she had done so about 3 hours before. The next morning, while still in bed, started with same symptoms, again lasted ~ 5 minutes and took nothing for it. Had a sick headache much of the next day, admits her appetite is poor and she's been losing weight. No melena. She's also had a constant chest tightness ever since her surgery in  that never goes away. She offers, unprompted, that her symptoms the other night may have been anxiety, she's been feeling more since she turned 89 earlier this month      BP at her PCP office 2 days ago was 120/70             Past cardiac history:   CABG  in [facility] with subsequent coronary stents twice in her local hospital in [place], [place]. Mar 2019- Echo normal SF and DF, AV sclerosis without stenosis   Oct 2019- normal 24 hour monitor          [place] CAD CHF Meds            simvastatin (ZOCOR) 20 MG tablet (Taking)    Sig - Route: Take 1 tablet by mouth nightly - Oral    metoprolol succinate (TOPROL XL) 25 MG extended release tablet (Taking)    Class: Historical Med    amLODIPine (NORVASC) 10 MG tablet    Class: Historical Med          Key Antihyperglycemic Medications       Patient is on no antihyperglycemic meds.

## 2023-10-04 NOTE — PROGRESS NOTES
[facility] CARDIOLOGY  [address] [facility], [address]  PHONE: [phone]      10/05/23    NAME:  Ry Crawley  : 1933  MRN: [id number]         SUBJECTIVE:   Ry Crawley is a 80 y.o. female seen for a follow up visit regarding the following:     Chief Complaint   Patient presents with    Coronary Artery Disease    Hypertension            HPI:  Follow up  Coronary Artery Disease and Hypertension   . Follow up CAD/prior CABG and stents, hypertension with orthostatic hypotension,  bradycardia, CKD, dyslipidemia with stable angina. LE lymphedema. Ranexa added last visit and simvastatin consequently reduced to address stable angina. Unable to tolerate many meds d/t hypotension and bradycardia     She has not had chest discomfort to speak of since Ranexa added. Past cardiac history:   CABG 2005 in [place], [place] with subsequent coronary stents twice in her local hospital in [place], [place]. Mar 2019- Echo normal SF and DF, AV sclerosis without stenosis   Oct 2019- normal 24 hour monitor               Key CAD CHF Meds            simvastatin (ZOCOR) 20 MG tablet (Taking)    Sig - Route: Take 1 tablet by mouth nightly - Oral    amLODIPine (NORVASC) 10 MG tablet (Taking)    Class: Historical Med    metoprolol succinate (TOPROL XL) 25 MG extended release tablet (Taking)    Class: Historical Med          Key Antihyperglycemic Medications       Patient is on no antihyperglycemic meds. Past Medical History, Past Surgical History, Family history, Social History, and Medications were all reviewed with the patient today and updated as necessary. Prior to Admission medications    Medication Sig Start Date End Date Taking?  Authorizing Provider   aspirin 81 MG EC tablet Take 1 tablet by mouth daily   Yes Joo Brown MD   Cyanocobalamin (VITAMIN B-12 PO) Take by mouth   Yes Joo Brown MD   Multiple Vitamins-Minerals (CENTRUM SILVER

## 2023-10-05 ENCOUNTER — OFFICE VISIT (OUTPATIENT)
Age: 88
End: 2023-10-05
Payer: MEDICARE

## 2023-10-05 VITALS
DIASTOLIC BLOOD PRESSURE: 62 MMHG | BODY MASS INDEX: 25.75 KG/M2 | WEIGHT: 136.4 LBS | HEIGHT: 61 IN | HEART RATE: 68 BPM | SYSTOLIC BLOOD PRESSURE: 138 MMHG

## 2023-10-05 DIAGNOSIS — I10 ESSENTIAL HYPERTENSION: ICD-10-CM

## 2023-10-05 DIAGNOSIS — I25.118 STABLE ANGINA PECTORIS DUE TO ARTERIOSCLEROSIS OF CORONARY ARTERY (HCC): Primary | ICD-10-CM

## 2023-10-05 DIAGNOSIS — E78.5 DYSLIPIDEMIA: ICD-10-CM

## 2023-10-05 PROCEDURE — G8484 FLU IMMUNIZE NO ADMIN: HCPCS | Performed by: INTERNAL MEDICINE

## 2023-10-05 PROCEDURE — 1036F TOBACCO NON-USER: CPT | Performed by: INTERNAL MEDICINE

## 2023-10-05 PROCEDURE — 99214 OFFICE O/P EST MOD 30 MIN: CPT | Performed by: INTERNAL MEDICINE

## 2023-10-05 PROCEDURE — G8417 CALC BMI ABV UP PARAM F/U: HCPCS | Performed by: INTERNAL MEDICINE

## 2023-10-05 PROCEDURE — 1123F ACP DISCUSS/DSCN MKR DOCD: CPT | Performed by: INTERNAL MEDICINE

## 2023-10-05 PROCEDURE — G8427 DOCREV CUR MEDS BY ELIG CLIN: HCPCS | Performed by: INTERNAL MEDICINE

## 2023-10-05 PROCEDURE — 1090F PRES/ABSN URINE INCON ASSESS: CPT | Performed by: INTERNAL MEDICINE

## 2023-11-21 ENCOUNTER — OFFICE VISIT (OUTPATIENT)
Dept: ORTHOPEDIC SURGERY | Age: 88
End: 2023-11-21
Payer: MEDICARE

## 2023-11-21 VITALS — WEIGHT: 141 LBS | HEIGHT: 60 IN | BODY MASS INDEX: 27.68 KG/M2

## 2023-11-21 DIAGNOSIS — R29.898 RIGHT LEG WEAKNESS: Primary | ICD-10-CM

## 2023-11-21 PROCEDURE — G8484 FLU IMMUNIZE NO ADMIN: HCPCS | Performed by: NURSE PRACTITIONER

## 2023-11-21 PROCEDURE — G8428 CUR MEDS NOT DOCUMENT: HCPCS | Performed by: NURSE PRACTITIONER

## 2023-11-21 PROCEDURE — 1090F PRES/ABSN URINE INCON ASSESS: CPT | Performed by: NURSE PRACTITIONER

## 2023-11-21 PROCEDURE — G8417 CALC BMI ABV UP PARAM F/U: HCPCS | Performed by: NURSE PRACTITIONER

## 2023-11-21 PROCEDURE — 99213 OFFICE O/P EST LOW 20 MIN: CPT | Performed by: NURSE PRACTITIONER

## 2023-11-21 PROCEDURE — 1123F ACP DISCUSS/DSCN MKR DOCD: CPT | Performed by: NURSE PRACTITIONER

## 2023-11-24 NOTE — CARE COORDINATION
Medicare Wellness Visit  Plan for Preventive Care    A good way for you to stay healthy is to use preventive care.  Medicare covers many services that can help you stay healthy.* The goal of these services is to find any health problems as quickly as possible. Finding problems early can help make them easier to treat.  Your personal plan below lists the services you may need and when they are due.      Health Maintenance Summary     COVID-19 Vaccine (5 - 2023-24 season)  Overdue since 9/1/2023    Traditional Medicare- Medicare Wellness Visit (Yearly)  Next due on 11/24/2024    Depression Screening (Yearly)  Next due on 11/24/2024    DTaP/Tdap/Td Vaccine (3 - Td or Tdap)  Next due on 11/9/2032    Hepatitis B Vaccine (For Physician/APC Discussion)   Completed    Pneumococcal Vaccine 65+   Completed    Shingles Vaccine   Completed    Influenza Vaccine   Completed    Meningococcal Vaccine   Aged Out    HPV Vaccine   Aged Out           Preventive Care for Women and Men    Heart Screenings (Cardiovascular):  · Blood tests are used to check your cholesterol, lipid and triglyceride levels. High levels can increase your risk for heart disease and stroke. High levels can be treated with medications, diet and exercise. Lowering your levels can help keep your heart and blood vessels healthy.  Your provider will order these tests if they are needed.    · An ultrasound is done to see if you have an abdominal aortic aneurysm (AAA).  This is an enlargement of one of the main blood vessels that delivers blood to the body.   In the [place], 9,000 deaths are caused by AAA.  You may not even know you have this problem and as many as 1 in 3 people will have a serious problem if it is not treated.  Early diagnosis allows for more effective treatment and cure.  If you have a family history of AAA or are a male age 65-75 who has smoked, you are at higher risk of an AAA.  Your provider can order this test, if needed.    Colorectal  Pt not medically ready for dc to SNF today. Facility notified and bed remains available tomorrow. SW updated pt and dtr. SW following to facilitate pt's transfer to rehab at [facility]. Screening:  · There are many tests that are used to check for cancer of your colon and rectum. You and your provider should discuss what test is best for you and when to have it done.  Options include:  · Screening Colonoscopy: exam of the entire colon, seen through a flexible lighted tube.  · Flexible Sigmoidoscopy: exam of the last third (sigmoid portion) of the colon and rectum, seen through a flexible lighted tube.  · Cologuard DNA stool test: a sample of your stool is used to screen for cancer and unseen blood in your stool.  · Fecal Occult Blood Test: a sample of your stool is studied to find any unseen blood    Flu Shot:  · An immunization that helps to prevent influenza (the flu). You should get this every year. The best time to get the shot is in the fall.    Pneumococcal Shot:  • Vaccines help prevent pneumococcal disease, which is any type of illness caused by Streptococcus pneumoniae bacteria. There are two kinds of pneumococcal vaccines available in the [place]:   o Pneumococcal conjugate vaccines (PCV20 or Zymgyjl14®)  o Pneumococcal polysaccharide vaccine (PPSV23 or Ndsuhpkwh17®)  · For those who have never received any pneumococcal conjugate vaccine, CDC recommends PVC20 for adults 65 years or older and adults 19 through 64 years old with certain medical conditions or risk factors.   · For those who have previously received PCV13, this should be followed by a dose of PPSV23.     Hepatitis B Shot:  · An immunization that helps to protect people from getting Hepatitis B. Hepatitis B is a virus that spreads through contact with infected blood or body fluids. Many people with the virus do not have symptoms.  The virus can lead to serious problems, such as liver disease. Some people are at higher risk than others. Your doctor will tell you if you need this shot.     Diabetes Screening:  · A test to measure sugar (glucose) in your blood is called a fasting blood sugar. Fasting means you cannot have  food or drink for at least 8 hours before the test. This test can detect diabetes long before you may notice symptoms.    Glaucoma Screening:  · Glaucoma screening is performed by your eye doctor. The test measures the fluid pressure inside your eyes to determine if you have glaucoma.     Hepatitis C Screening:  · A blood test to see if you have the hepatitis C virus.  Hepatitis C attacks the liver and is a major cause of chronic liver disease.  Medicare will cover a single screening for all adults born between 1945 & 1965, or high risk patients (people who have injected illegal drugs or people who have had blood transfusions).  High risk patients who continue to inject illegal drugs can be screened for Hepatitis C every year.    Smoking and Tobacco-Use Cessation Counseling:  · Tobacco is the single greatest cause of disease and early death in our country today. Medication and counseling together can increase a person’s chance of quitting for good.   · Medicare covers two quitting attempts per year, with four counseling sessions per attempt (eight sessions in a 12 month period)    Preventive Screening tests for Women    Screening Mammograms and Breast Exams:  · An x-ray of your breasts to check for breast cancer before you or your doctor may be able to feel it.  If breast cancer is found early it can usually be treated with success.    Pelvic Exams and Pap Tests:  · An exam to check for cervical and vaginal cancer. A Pap test is a lab test in which cells are taken from your cervix and sent to the lab to look for signs of cervical cancer. If cancer of the cervix is found early, chances for a cure are good. Testing can generally end at age 65, or if a woman has a hysterectomy for a benign condition. Your provider may recommend more frequent testing if certain abnormal results are found.    Bone Mass Measurements:  · A painless x-ray of your bone density to see if you are at risk for a broken bone. Bone density refers  to the thickness of bones or how tightly the bone tissue is packed.    Preventive Screening tests for Men    Prostate Screening:  · Should you have a prostate cancer test (PSA)?  It is up to you to decide if you want a prostate cancer test. Talk to your clinician to find out if the test is right for you.  Things for you to consider and talk about should include:  · Benefits and harms of the test  · Your family history  · How your race/ethnicity may influence the test  · If the test may impact other medical conditions you have  · Your values on screenings and treatments    *Medicare pays for many preventive services to keep you healthy. For some of these services, you might have to pay a deductible, coinsurance, and / or copayment.  The amounts vary depending on the type of services you need and the kind of Medicare health plan you have.    For further details on screenings offered by Medicare please visit: https://www.medicare.gov/coverage/preventive-screening-services

## 2023-11-29 ENCOUNTER — HOSPITAL ENCOUNTER (OUTPATIENT)
Dept: PHYSICAL THERAPY | Age: 88
Setting detail: RECURRING SERIES
Discharge: HOME | End: 2023-12-02
Attending: ORTHOPAEDIC SURGERY
Payer: MEDICARE

## 2023-11-29 DIAGNOSIS — R26.2 DIFFICULTY IN WALKING, NOT ELSEWHERE CLASSIFIED: ICD-10-CM

## 2023-11-29 DIAGNOSIS — R26.89 BALANCE DISORDER: ICD-10-CM

## 2023-11-29 DIAGNOSIS — M62.81 MUSCLE WEAKNESS (GENERALIZED): Primary | ICD-10-CM

## 2023-11-29 PROCEDURE — 97112 NEUROMUSCULAR REEDUCATION: CPT

## 2023-11-29 PROCEDURE — 97110 THERAPEUTIC EXERCISES: CPT

## 2023-11-29 PROCEDURE — 97162 PT EVAL MOD COMPLEX 30 MIN: CPT

## 2023-11-29 NOTE — THERAPY EVALUATION
Mounika Morales  : 1933  Primary: Medicare Part A And B (Medicare)  Secondary: [address] @ [address] [facility]  [address]  [address] [facility]  Phone: [phone]  Fax: [phone] Plan Frequency: 2x/wk for 90 days    Plan of Care/Certification Expiration Date: 24      PT Visit Info:  Plan Frequency: 2x/wk for 90 days  Plan of Care/Certification Expiration Date: 24      Visit Count:  1                OUTPATIENT PHYSICAL THERAPY:             Initial Assessment 2023               Episode (weakness, balance) Appt Desk         Treatment Diagnosis:    Muscle weakness (generalized)  Difficulty in walking, not elsewhere classified  Balance disorder  Medical/Referring Diagnosis:    Right leg weakness [R29.898]   Referring Physician:  Inge Goodson MD MD Orders:  PT Eval and Treat   Return MD Appt:  24  Date of Onset:  Onset Date: 23      Allergies:  Penicillins  Restrictions/Precautions:    Restrictions/Precautions: Fall Risk      Medications Last Reviewed:  2023     SUBJECTIVE   History of Injury/Illness (Reason for Referral): Pt presents with complaint of R leg weakness for several months. Reports \"shakiness\" and no pain. Pt had a L JOVANY 1.5 years ago. She has had several falls. Lives with daughter with no stairs. She uses a cane and rollator. She reports having to lift her R leg to get in and out of bed. Her goals include to work on strength and balance. Patient Stated Goal(s):  \"able to walk further\"  Initial:     010 Post Session:     010  Past Medical History/Comorbidities:   Ms. Garret Dykes  has a past medical history of Breast cancer ([address]), CAD (coronary artery disease), GERD (gastroesophageal reflux disease), Hiatal hernia, High triglycerides, History of heart artery stent, History of kidney problems, and Hypertension.   Ms. Garret Dykes  has a past surgical history that includes Coronary angioplasty with stent

## 2023-11-30 ASSESSMENT — PAIN SCALES - GENERAL: PAINLEVEL_OUTOF10: 0

## 2023-11-30 NOTE — PROGRESS NOTES
Safia Yost  : 1933  Primary: Medicare Part A And B (Medicare)  Secondary: [address] @ [address] [facility]  [address]  [address] [facility]  Phone: [phone]  Fax: [phone] Plan Frequency: 2x/wk for 90 days    Plan of Care/Certification Expiration Date: 24      >PT Visit Info:  Plan Frequency: 2x/wk for 90 days  Plan of Care/Certification Expiration Date: 24      Visit Count:  1    OUTPATIENT PHYSICAL THERAPY: Treatment Note 2023       Episode  }Appt Desk             Treatment Diagnosis:    Muscle weakness (generalized)  Difficulty in walking, not elsewhere classified  Balance disorder  Medical/Referring Diagnosis:    Right leg weakness  Referring Physician:  Martin Tapia MD MD Orders:  PT Eval and Treat   Date of Onset:  Onset Date: 23     Allergies:   Penicillins  Restrictions/Precautions:  Restrictions/Precautions: Fall Risk  No data recorded   Interventions Planned (Treatment may consist of any combination of the following):    Current Treatment Recommendations: Strengthening; ROM; Balance training; Functional mobility training; Transfer training; Endurance training; Gait training; Neuromuscular re-education; Manual; Home exercise program; Patient/Caregiver education & training     >Subjective Comments: see eval note     >Initial:     0/10>Post Session:       0/10  Medications Last Reviewed:  2023  Updated Objective Findings:  See Evaluation Note from today  Treatment   THERAPEUTIC EXERCISE: (25 minutes):    Exercises per grid below to improve mobility, strength, and balance. Required maximal visual, verbal, manual, and tactile cues to promote proper body alignment, promote proper body posture, promote proper body mechanics, promote proper body breathing techniques, and proper technique . Progressed resistance, range, repetitions, and complexity of movement as indicated.      Date:  23

## 2023-12-01 ENCOUNTER — HOSPITAL ENCOUNTER (OUTPATIENT)
Dept: PHYSICAL THERAPY | Age: 88
Setting detail: RECURRING SERIES
Discharge: HOME | End: 2023-12-04
Attending: ORTHOPAEDIC SURGERY
Payer: MEDICARE

## 2023-12-01 PROCEDURE — 97110 THERAPEUTIC EXERCISES: CPT

## 2023-12-01 PROCEDURE — 97112 NEUROMUSCULAR REEDUCATION: CPT

## 2023-12-01 ASSESSMENT — PAIN SCALES - GENERAL: PAINLEVEL_OUTOF10: 0

## 2023-12-01 NOTE — PROGRESS NOTES
Vincent Abdalla  : 1933  Primary: Medicare Part A And B (Medicare)  Secondary: [address] @ [address] [facility]  [address]  [address] [facility]  Phone: [phone]  Fax: [phone] Plan Frequency: 2x/wk for 90 days    Plan of Care/Certification Expiration Date: 24      >PT Visit Info:  Plan Frequency: 2x/wk for 90 days  Plan of Care/Certification Expiration Date: 24      Visit Count:  2    OUTPATIENT PHYSICAL THERAPY: Treatment Note 2023       Episode  }Appt Desk             Treatment Diagnosis:    Muscle weakness (generalized)  Difficulty in walking, not elsewhere classified  Balance disorder  Medical/Referring Diagnosis:    Right leg weakness [R29.898]    Referring Physician:  Neela Hobbs MD MD Orders:  PT Eval and Treat   Date of Onset:  Onset Date: 23     Allergies:   Penicillins  Restrictions/Precautions:  Restrictions/Precautions: Fall Risk  No data recorded   Interventions Planned (Treatment may consist of any combination of the following):    Current Treatment Recommendations: Strengthening; ROM; Balance training; Functional mobility training; Transfer training; Endurance training; Gait training; Neuromuscular re-education; Manual; Home exercise program; Patient/Caregiver education & training     >Subjective Comments: Pt reports she has been performing her exercises and trying to walk more at home. >Initial:     0/10>Post Session:        /10  Medications Last Reviewed:  2023  Updated Objective Findings:  none today  Treatment   THERAPEUTIC EXERCISE: (29 minutes):    Exercises per grid below to improve mobility, strength, and balance. Required maximal visual, verbal, manual, and tactile cues to promote proper body alignment, promote proper body posture, promote proper body mechanics, promote proper body breathing techniques, and proper technique .   Progressed resistance, range, repetitions, and complexity

## 2023-12-05 ENCOUNTER — HOSPITAL ENCOUNTER (OUTPATIENT)
Dept: PHYSICAL THERAPY | Age: 88
Setting detail: RECURRING SERIES
Discharge: HOME | End: 2023-12-08
Attending: ORTHOPAEDIC SURGERY
Payer: MEDICARE

## 2023-12-05 PROCEDURE — 97110 THERAPEUTIC EXERCISES: CPT

## 2023-12-05 PROCEDURE — 97112 NEUROMUSCULAR REEDUCATION: CPT

## 2023-12-05 ASSESSMENT — PAIN SCALES - GENERAL: PAINLEVEL_OUTOF10: 0

## 2023-12-05 NOTE — PROGRESS NOTES
as indicated. Date:  12/05/23   Activity/Exercise Parameters   Seated heel toe raise    Seated hip abd GTB    Seated march    Seated LAQ    bridge 2x10   SLR  x10 ea   Ball squeeze H/L X20 3 sec    H/L knee march X20 ea   H/L hip abd BTB    Gait with cane and CGA    Standing 3 way hip Standing march x20   Standing heel toe raises X20 ea   Step ups 4 in step with 1 UE support X15 ea     NEUROMUSCULAR RE-EDUCATION: (25 minutes):    Exercise/activities per grid below to improve balance, coordination, kinesthetic sense, posture, and proprioception. Required maximal visual, verbal, manual, and tactile cues to promote static and dynamic balance in standing and promote motor control of bilateral.   Date:  12/05/23   Activity/Exercise Parameters   Eyes closed balance on foam 1 min   NBOS EO balance 1 min   Semi tandem balance Each side 30 sec    Sit to stand from mat with no UE use X20 with cues for posture and balance   Toe taps with one UE support  X30   Picking up item off floor    Turning to look over shoulders On foam x10   Side stepping at bar  5 laps with UE support   Stepping over half foam roll       Treatment/Session Summary:    >Treatment Assessment: Pt requires close SBA to CGA for all balance drills. She requires cueing for upright posture as well. She demonstrates good tolerance for therapeutic exercise considering her age. Communication/Consultation:    Equipment provided today:    Recommendations/Intent for next treatment session: Next visit will focus on strength, balance, gait.     >Total Treatment Billable Duration:  40 minutes  Time In: 1115  Time Out: 1200    Baltazar Peng PT       Charge Capture  }Post Session Pain  PT Visit Info  [facility] Portal  MD Guidelines  Scanned Media  Benefits  MyChart    Future Appointments   Date Time Provider [address]   12/13/2023  1:00 PM Rudy Salgado PT [facility]   12/15/2023  1:45 PM Baltazar Peng PT [facility]   12/20/2023  2:00 PM Rudy Salgado

## 2023-12-13 ENCOUNTER — HOSPITAL ENCOUNTER (OUTPATIENT)
Dept: PHYSICAL THERAPY | Age: 88
Setting detail: RECURRING SERIES
End: 2023-12-13
Attending: ORTHOPAEDIC SURGERY
Payer: MEDICARE

## 2023-12-13 NOTE — PROGRESS NOTES
Lulu Brandon  : 1933  Primary: Medicare Part A And B  Secondary: [employer] MEDICARE [facility]PP [facility] [address] [facility] [address]  Phone: [phone]  Fax: [phone]       2023      Patient cancelled today's appointment and will plan to follow up at next scheduled visit.       ADITI ANTUNEZ, PT

## 2023-12-15 ENCOUNTER — HOSPITAL ENCOUNTER (OUTPATIENT)
Dept: PHYSICAL THERAPY | Age: 88
Setting detail: RECURRING SERIES
End: 2023-12-15
Attending: ORTHOPAEDIC SURGERY
Payer: MEDICARE

## 2023-12-15 NOTE — PROGRESS NOTES
Lulu Brandon   (:1933) [facility] at  [place] [address]  [phone]  [phone]             DATE: 12/15/2023    Patient canceled appointment today with less than 24 hr notice due to sickness.  Will plan to follow up on next scheduled visit.    Kelsi Acevedo, PT, DPT

## 2023-12-20 ENCOUNTER — HOSPITAL ENCOUNTER (OUTPATIENT)
Dept: PHYSICAL THERAPY | Age: 88
Setting detail: RECURRING SERIES
End: 2023-12-20
Attending: ORTHOPAEDIC SURGERY
Payer: MEDICARE

## 2023-12-20 NOTE — PROGRESS NOTES
Lulu Brandon  : 1933  Primary: Medicare Part A And B  Secondary: [employer] HE[employer] C[employer] MEDICARE SUPP [facility] [address] [facility] [address]  Phone: [phone]  Fax: [phone]       2023      Patient cancelled today's appointment and will plan to follow up at next scheduled visit.       ADITI ANTUNEZ, PT

## 2023-12-22 ENCOUNTER — HOSPITAL ENCOUNTER (OUTPATIENT)
Dept: PHYSICAL THERAPY | Age: 88
Setting detail: RECURRING SERIES
End: 2023-12-22
Attending: ORTHOPAEDIC SURGERY
Payer: MEDICARE

## 2023-12-22 NOTE — PROGRESS NOTES
Lulu Brandon  : 1933  Primary: Medicare Part A And B  Secondary: [employer] HE[employer] C[employer] MEDICARE SUPP [facility] [address] [facility] [address]  Phone: [phone]  Fax: [phone]       2023      Patient cancelled today's appointment and will plan to follow up at next scheduled visit.       ADITI ANTUNEZ, PT

## 2024-04-10 NOTE — PROGRESS NOTES
reviewed with patient. .       ASSESSMENT and PLAN    Lulu MÁRQUEZ was seen today for follow-up and coronary artery disease.    Diagnoses and all orders for this visit:    Coronary artery disease of native artery of native heart with stable angina pectoris (HCC)  -     EKG 12 Lead    Primary hypertension    Palpitations  -     Cardiac event monitor; Future    Other orders  -     nitroGLYCERIN (NITROSTAT) 0.4 MG SL tablet; Place 1 tablet under the tongue every 5 minutes as needed for Chest pain  -     amLODIPine (NORVASC) 10 MG tablet; Take 1 tablet by mouth daily  -     isosorbide mononitrate (IMDUR) 60 MG extended release tablet; Take 1 tablet by mouth daily  -     metoprolol succinate (TOPROL XL) 25 MG extended release tablet; Take 2 tablets by mouth daily Pt unsure which dose  -     ranolazine (RANEXA) 500 MG extended release tablet; Take 1 tablet by mouth 2 times daily          IMPRESSION:    Stable angina, continue meds    Palpitations, none for about 6-8 weeks but at risk for afib.  Will start with 30 day monitor.  If recurs try to get to urgent care or call us for EKG during symptoms        Return in about 6 months (around 10/12/2024) for WILL CALL RESULTS.          Thank you for allowing me to participate in this patient's care.  Please call or contact me if there are any questions or concerns regarding the above.      TONY FLOWER MD  04/12/24  1:03 PM

## 2024-04-12 ENCOUNTER — OFFICE VISIT (OUTPATIENT)
Age: 89
End: 2024-04-12
Payer: MEDICARE

## 2024-04-12 VITALS
HEART RATE: 61 BPM | HEIGHT: 61 IN | WEIGHT: 141 LBS | BODY MASS INDEX: 26.62 KG/M2 | DIASTOLIC BLOOD PRESSURE: 80 MMHG | SYSTOLIC BLOOD PRESSURE: 162 MMHG

## 2024-04-12 DIAGNOSIS — R00.2 PALPITATIONS: ICD-10-CM

## 2024-04-12 DIAGNOSIS — I25.118 CORONARY ARTERY DISEASE OF NATIVE ARTERY OF NATIVE HEART WITH STABLE ANGINA PECTORIS (HCC): Primary | ICD-10-CM

## 2024-04-12 DIAGNOSIS — I10 PRIMARY HYPERTENSION: ICD-10-CM

## 2024-04-12 PROCEDURE — 99214 OFFICE O/P EST MOD 30 MIN: CPT | Performed by: INTERNAL MEDICINE

## 2024-04-12 PROCEDURE — 1036F TOBACCO NON-USER: CPT | Performed by: INTERNAL MEDICINE

## 2024-04-12 PROCEDURE — 1123F ACP DISCUSS/DSCN MKR DOCD: CPT | Performed by: INTERNAL MEDICINE

## 2024-04-12 PROCEDURE — G8417 CALC BMI ABV UP PARAM F/U: HCPCS | Performed by: INTERNAL MEDICINE

## 2024-04-12 PROCEDURE — 93000 ELECTROCARDIOGRAM COMPLETE: CPT | Performed by: INTERNAL MEDICINE

## 2024-04-12 PROCEDURE — 1090F PRES/ABSN URINE INCON ASSESS: CPT | Performed by: INTERNAL MEDICINE

## 2024-04-12 PROCEDURE — G8427 DOCREV CUR MEDS BY ELIG CLIN: HCPCS | Performed by: INTERNAL MEDICINE

## 2024-04-12 RX ORDER — NITROGLYCERIN 0.4 MG/1
0.4 TABLET SUBLINGUAL EVERY 5 MIN PRN
Qty: 25 TABLET | Refills: 5 | Status: CANCELLED | OUTPATIENT
Start: 2024-04-12

## 2024-04-12 RX ORDER — METOPROLOL SUCCINATE 25 MG/1
50 TABLET, EXTENDED RELEASE ORAL DAILY
Qty: 90 TABLET | Refills: 3 | Status: SHIPPED | OUTPATIENT
Start: 2024-04-12

## 2024-04-12 RX ORDER — NITROGLYCERIN 0.4 MG/1
0.4 TABLET SUBLINGUAL EVERY 5 MIN PRN
Qty: 25 TABLET | Refills: 5 | Status: SHIPPED | OUTPATIENT
Start: 2024-04-12

## 2024-04-12 RX ORDER — AMLODIPINE BESYLATE 10 MG/1
10 TABLET ORAL DAILY
Qty: 90 TABLET | Refills: 3 | Status: SHIPPED | OUTPATIENT
Start: 2024-04-12

## 2024-04-12 RX ORDER — RANOLAZINE 500 MG/1
500 TABLET, EXTENDED RELEASE ORAL 2 TIMES DAILY
Qty: 180 TABLET | Refills: 3 | Status: SHIPPED | OUTPATIENT
Start: 2024-04-12

## 2024-04-12 RX ORDER — ISOSORBIDE MONONITRATE 60 MG/1
60 TABLET, EXTENDED RELEASE ORAL DAILY
Qty: 90 TABLET | Refills: 3 | Status: SHIPPED | OUTPATIENT
Start: 2024-04-12

## 2024-04-12 NOTE — PATIENT INSTRUCTIONS
Patient Education        Palpitations: Care Instructions  Overview     Heart palpitations are the uncomfortable sensation that your heart is beating fast or irregularly. You might feel pounding or fluttering in your chest. It might feel like your heart is skipping a beat.  Palpitations may be caused by a heart problem. But they also occur because of many other things. These include other health problems, stress, exercise, or use of alcohol, caffeine, or nicotine. Some prescription medicines and over-the-counter medicines can also cause heart palpitations. Nearly everyone has palpitations from time to time.  Depending on your symptoms, your doctor may need to do more tests to try to find the cause of your palpitations.  Follow-up care is a key part of your treatment and safety. Be sure to make and go to all appointments, and call your doctor if you are having problems. It's also a good idea to know your test results and keep a list of the medicines you take.  How can you care for yourself at home?  If they trigger palpitations, limit or avoid alcohol or caffeine.  Do not smoke. If you need help quitting, talk to your doctor about stop-smoking programs and medicines. These can increase your chances of quitting for good.  Ask your doctor whether you can take over-the-counter medicines (such as decongestants). These may cause palpitations.  If you think you may have a problem with drug use, talk to your doctor. Certain drugs, such as cocaine and methamphetamine, can affect your heart rate and rhythm.  If you have palpitations again, take deep breaths and try to relax. Or try any physical things that your doctor recommended. These may include bearing down or coughing.  If you start to feel lightheaded, sit or lie down to avoid injuries that might result if you pass out and fall down.  If your doctor recommends it, keep a record of your palpitations and bring it to your next doctor's appointment. Write down:  The date and

## 2024-05-21 ENCOUNTER — TELEPHONE (OUTPATIENT)
Age: 89
End: 2024-05-21

## 2024-05-21 NOTE — TELEPHONE ENCOUNTER
----- Message from Ilda Landon MD sent at 5/21/2024 12:26 PM EDT -----  Please notify patient monitor looks fine

## 2024-10-15 NOTE — TELEPHONE ENCOUNTER
Called patient to verify dosage of Metoprolol. Patient stated she has been taking 50mg once daily.    Requested Prescriptions     Pending Prescriptions Disp Refills    metoprolol succinate (TOPROL XL) 50 MG extended release tablet 90 tablet 3     Sig: Take 1 tablet by mouth daily

## 2024-10-16 PROBLEM — I49.5 SICK SINUS SYNDROME (HCC): Status: ACTIVE | Noted: 2024-10-16

## 2024-10-16 RX ORDER — METOPROLOL SUCCINATE 50 MG/1
50 TABLET, EXTENDED RELEASE ORAL DAILY
Qty: 90 TABLET | Refills: 3 | Status: SHIPPED | OUTPATIENT
Start: 2024-10-16

## 2024-11-05 NOTE — PROGRESS NOTES
[facility] CARDIOLOGY  [address] [place] [address], [address]  [place] [address]  PHONE: [phone]      24    NAME:  Lulu Brandon  : 1933  MRN: [id number]         SUBJECTIVE:   Lulu Brandon is a 89 y.o. female seen for a follow up visit regarding the following:     Chief Complaint   Patient presents with    Coronary Artery Disease            HPI:  Follow up  Coronary Artery Disease   .    Follow up CAD/prior CABG and stents, hypertension with orthostatic hypotension,  bradycardia, CKD, dyslipidemia with stable angina. LE lymphedema. .  Unable to tolerate many meds d/t hypotension and bradycardia    Presented last visit with tachypalpitations.  Ambulatory monitor, below, with no significant findings.   She has had fewer palpitations than previous.  Occasional shortness of breath and chest pain, typically resolves in just a moment.  Chronic ankle edema goes down overnight, wears compression socks.            Past cardiac history:   CABG 2005 in [place], [place] with subsequent coronary stents twice in her local hospital in [facility].     Mar 2019- Echo normal SF and DF, AV sclerosis without stenosis   Oct 2019- normal 24 hour monitor  Sep 2024       18 day monitor.  SR, SB, ST. Average rate 59, range .  Rare ectopic. Rare, brief atrial run, longest 4 seconds.  No block or pause. No diary entries                   Cardiac Medications       Calcium Channel Blockers       amLODIPine (NORVASC) 10 MG tablet Take 1 tablet by mouth daily       Nitrates       nitroGLYCERIN (NITROSTAT) 0.4 MG SL tablet Place 1 tablet under the tongue every 5 minutes as needed for Chest pain     isosorbide mononitrate (IMDUR) 60 MG extended release tablet Take 1 tablet by mouth daily       Beta Blockers Cardio-Selective       metoprolol succinate (TOPROL XL) 50 MG extended release tablet Take 1 tablet by mouth daily       HMG CoA Reductase Inhibitors       simvastatin (ZOCOR) 20 MG tablet Take 1 tablet by

## 2024-11-06 ENCOUNTER — OFFICE VISIT (OUTPATIENT)
Age: 89
End: 2024-11-06

## 2024-11-06 VITALS
HEIGHT: 61 IN | BODY MASS INDEX: 26.62 KG/M2 | DIASTOLIC BLOOD PRESSURE: 74 MMHG | HEART RATE: 64 BPM | SYSTOLIC BLOOD PRESSURE: 120 MMHG | WEIGHT: 141 LBS

## 2024-11-06 DIAGNOSIS — I25.118 CORONARY ARTERY DISEASE OF NATIVE ARTERY OF NATIVE HEART WITH STABLE ANGINA PECTORIS (HCC): ICD-10-CM

## 2024-11-06 DIAGNOSIS — I49.5 SICK SINUS SYNDROME (HCC): ICD-10-CM

## 2024-11-06 DIAGNOSIS — I35.8 AORTIC VALVE SCLEROSIS: Primary | ICD-10-CM

## 2025-01-27 NOTE — PROGRESS NOTES
[facility] CARDIOLOGY  [address] [place] [address], [address]  [place] [address]  PHONE: [phone]      25    NAME:  Lulu Brandon  : 1933  MRN: [id number]         SUBJECTIVE:   Lulu Brandon is a 89 y.o. female seen for a follow up visit regarding the following:     Chief Complaint   Patient presents with    Follow-Up from Hospital    Coronary Artery Disease            HPI:  Follow up  Follow-Up from Hospital and Coronary Artery Disease   .    Follow up CAD/prior CABG and stents, hypertension with orthostatic hypotension,  bradycardia, CKD, dyslipidemia with stable angina. LE lymphedema. .  Unable to tolerate many meds d/t hypotension and bradycardia. Hospital stay in [place] with palpitations, found to be in afib with RVR. She spontaneously converted and was started on Eliquis, maintained on her home dose of metoprolol. She presented the following day with vaginal bleeding.  Noted to have a kidney stone and hemorrhagic cystitis, normal renal function, incidentally noted AAA with mural thrombus.  They transferred her for hospital admission to [facility] at family's request d/t weakness. They noted findings on CT related to either debris from UTI vs neoplasm.  Urology evaluated with resolution and plans for outpatient cystoscopy.  They resumed Eliquis at discharge and also referred her to vascular surgery for the incidentally noted AAA, though nowhere in the report are the dimensions of the aorta even mentioned.     She denies further palpitations or bleeding but she's not taking the Eliquis, she believed they told her she didn't need it but that is erroneous.  She had her cystoscopy last week, gratefully this was normal.  They just had a miscommunication about the Eliquis so never filled it.  She remains mentally alert, enjoys reading and watching movies.  Her daughter is with her today.                Past cardiac history:   CABG  in [place], [place] with

## 2025-01-28 ENCOUNTER — OFFICE VISIT (OUTPATIENT)
Age: 89
End: 2025-01-28
Payer: MEDICARE

## 2025-01-28 VITALS
SYSTOLIC BLOOD PRESSURE: 118 MMHG | HEART RATE: 62 BPM | WEIGHT: 143 LBS | HEIGHT: 61 IN | DIASTOLIC BLOOD PRESSURE: 64 MMHG | BODY MASS INDEX: 27 KG/M2

## 2025-01-28 DIAGNOSIS — I25.118 CORONARY ARTERY DISEASE OF NATIVE ARTERY OF NATIVE HEART WITH STABLE ANGINA PECTORIS (HCC): ICD-10-CM

## 2025-01-28 DIAGNOSIS — I49.5 SICK SINUS SYNDROME (HCC): Primary | ICD-10-CM

## 2025-01-28 DIAGNOSIS — I48.0 PAROXYSMAL ATRIAL FIBRILLATION (HCC): ICD-10-CM

## 2025-01-28 PROCEDURE — G8417 CALC BMI ABV UP PARAM F/U: HCPCS | Performed by: INTERNAL MEDICINE

## 2025-01-28 PROCEDURE — 93000 ELECTROCARDIOGRAM COMPLETE: CPT | Performed by: INTERNAL MEDICINE

## 2025-01-28 PROCEDURE — 1160F RVW MEDS BY RX/DR IN RCRD: CPT | Performed by: INTERNAL MEDICINE

## 2025-01-28 PROCEDURE — 1126F AMNT PAIN NOTED NONE PRSNT: CPT | Performed by: INTERNAL MEDICINE

## 2025-01-28 PROCEDURE — G8427 DOCREV CUR MEDS BY ELIG CLIN: HCPCS | Performed by: INTERNAL MEDICINE

## 2025-01-28 PROCEDURE — 99214 OFFICE O/P EST MOD 30 MIN: CPT | Performed by: INTERNAL MEDICINE

## 2025-01-28 PROCEDURE — 1090F PRES/ABSN URINE INCON ASSESS: CPT | Performed by: INTERNAL MEDICINE

## 2025-01-28 PROCEDURE — 1123F ACP DISCUSS/DSCN MKR DOCD: CPT | Performed by: INTERNAL MEDICINE

## 2025-01-28 PROCEDURE — 1036F TOBACCO NON-USER: CPT | Performed by: INTERNAL MEDICINE

## 2025-01-28 PROCEDURE — 1159F MED LIST DOCD IN RCRD: CPT | Performed by: INTERNAL MEDICINE

## 2025-03-03 NOTE — TELEPHONE ENCOUNTER
MEDICATION REFILL REQUEST      Name of Medication:  Eliquis   Dose:  5 mg  Frequency:  twice a day   Quantity:    Days' supply:  90 day     Pharmacy Name/Location:  [facility]

## 2025-03-03 NOTE — TELEPHONE ENCOUNTER
Requested Prescriptions     Pending Prescriptions Disp Refills    apixaban (ELIQUIS) 5 MG TABS tablet 180 tablet 3     Sig: Take 1 tablet by mouth 2 times daily     Rx verified last ov 1/28/25

## 2025-07-29 PROBLEM — I48.0 PAROXYSMAL ATRIAL FIBRILLATION (HCC): Status: ACTIVE | Noted: 2025-07-29

## 2025-07-29 NOTE — PROGRESS NOTES
Apoaequorin (PREVAGEN PO) Take by mouth   Yes ProviderJoo MD   clonazePAM (KLONOPIN) 0.5 MG tablet Take 1 tablet by mouth nightly as needed for Anxiety (or sleep) for up to 3 days. 6/21/22 7/31/25 Yes Jalil Thorne MD   CALCIUM PO Take by mouth   Yes Automatic Reconciliation, Ar   ascorbic acid (VITAMIN C) 500 MG tablet Take by mouth   Yes Automatic Reconciliation, Ar   vitamin D 25 MCG (1000 UT) CAPS Take by mouth daily   Yes Automatic Reconciliation, Ar   esomeprazole (NEXIUM) 40 MG delayed release capsule TAKE 1 CAPSULE BY MOUTH DAILY 2/1/21  Yes Automatic Reconciliation, Ar   meclizine (ANTIVERT) 25 MG tablet Take by mouth 3 times daily as needed   Yes Automatic Reconciliation, Ar   ranolazine (RANEXA) 500 MG extended release tablet Take 1 tablet by mouth 2 times daily  Patient not taking: Reported on 7/31/2025 4/12/24   Ilda Landon MD   [facility] (LUTEIN 20 PO) Take by mouth    ProviderJoo MD     Allergies   Allergen Reactions    Lisinopril Other (See Comments)     Hyperkalemia    Penicillins Hives     Past Medical History:   Diagnosis Date    Breast cancer (HCC)     left s/p chemo and mastectomy 1990    CAD (coronary artery disease)     GERD (gastroesophageal reflux disease)     Hiatal hernia     High triglycerides     History of heart artery stent     x 2    History of kidney problems     Hypertension      Past Surgical History:   Procedure Laterality Date    CORONARY ANGIOPLASTY WITH STENT PLACEMENT  2005    x 2    CORONARY ARTERY BYPASS GRAFT      HIP SURGERY Left 6/16/2022    LEFT HIP HEMIARTHROPLASTY/ CHOICE performed by Ashwin Valdez MD at [facility] MAIN OR    HYSTERECTOMY (CERVIX STATUS UNKNOWN)      HYSTERECTOMY (CERVIX STATUS UNKNOWN)      MASTECTOMY Left 1990    ROTATOR CUFF REPAIR Right     VEIN SURGERY       Family History   Problem Relation Age of Onset    Hypertension Mother     Stroke Mother     Coronary Art Dis Father      Social History

## 2025-07-31 ENCOUNTER — OFFICE VISIT (OUTPATIENT)
Age: 89
End: 2025-07-31
Payer: MEDICARE

## 2025-07-31 VITALS
BODY MASS INDEX: 25.86 KG/M2 | HEART RATE: 64 BPM | WEIGHT: 137 LBS | SYSTOLIC BLOOD PRESSURE: 120 MMHG | DIASTOLIC BLOOD PRESSURE: 52 MMHG | HEIGHT: 61 IN

## 2025-07-31 DIAGNOSIS — I71.43 INFRARENAL ABDOMINAL AORTIC ANEURYSM (AAA) WITHOUT RUPTURE: ICD-10-CM

## 2025-07-31 DIAGNOSIS — I25.118 CORONARY ARTERY DISEASE OF NATIVE ARTERY OF NATIVE HEART WITH STABLE ANGINA PECTORIS: ICD-10-CM

## 2025-07-31 DIAGNOSIS — I48.0 PAROXYSMAL ATRIAL FIBRILLATION (HCC): ICD-10-CM

## 2025-07-31 DIAGNOSIS — I10 ESSENTIAL HYPERTENSION: Primary | ICD-10-CM

## 2025-07-31 DIAGNOSIS — I49.5 SICK SINUS SYNDROME (HCC): ICD-10-CM

## 2025-07-31 PROCEDURE — 1160F RVW MEDS BY RX/DR IN RCRD: CPT | Performed by: INTERNAL MEDICINE

## 2025-07-31 PROCEDURE — 99214 OFFICE O/P EST MOD 30 MIN: CPT | Performed by: INTERNAL MEDICINE

## 2025-07-31 PROCEDURE — G8427 DOCREV CUR MEDS BY ELIG CLIN: HCPCS | Performed by: INTERNAL MEDICINE

## 2025-07-31 PROCEDURE — 1090F PRES/ABSN URINE INCON ASSESS: CPT | Performed by: INTERNAL MEDICINE

## 2025-07-31 PROCEDURE — 1126F AMNT PAIN NOTED NONE PRSNT: CPT | Performed by: INTERNAL MEDICINE

## 2025-07-31 PROCEDURE — 1123F ACP DISCUSS/DSCN MKR DOCD: CPT | Performed by: INTERNAL MEDICINE

## 2025-07-31 PROCEDURE — 1036F TOBACCO NON-USER: CPT | Performed by: INTERNAL MEDICINE

## 2025-07-31 PROCEDURE — G8417 CALC BMI ABV UP PARAM F/U: HCPCS | Performed by: INTERNAL MEDICINE

## 2025-07-31 PROCEDURE — 1159F MED LIST DOCD IN RCRD: CPT | Performed by: INTERNAL MEDICINE

## 2025-07-31 ASSESSMENT — ENCOUNTER SYMPTOMS: SHORTNESS OF BREATH: 0

## 2025-08-18 ENCOUNTER — TELEPHONE (OUTPATIENT)
Age: 89
End: 2025-08-18

## 2025-08-20 ENCOUNTER — TRANSCRIBE ORDERS (OUTPATIENT)
Dept: SCHEDULING | Age: 89
End: 2025-08-20

## 2025-08-20 DIAGNOSIS — N63.13 LUMP IN LOWER OUTER QUADRANT OF RIGHT BREAST: Primary | ICD-10-CM

## 2025-08-20 DIAGNOSIS — N63.14 MASS OF LOWER INNER QUADRANT OF RIGHT BREAST: Primary | ICD-10-CM

## 2025-09-04 ENCOUNTER — HOSPITAL ENCOUNTER (OUTPATIENT)
Dept: MAMMOGRAPHY | Age: 89
Discharge: HOME | End: 2025-09-04
Payer: MEDICARE

## 2025-09-04 ENCOUNTER — APPOINTMENT (OUTPATIENT)
Dept: MAMMOGRAPHY | Age: 89
End: 2025-09-04
Payer: MEDICARE

## 2025-09-04 ENCOUNTER — TELEPHONE (OUTPATIENT)
Age: 89
End: 2025-09-04

## 2025-09-04 ENCOUNTER — HOSPITAL ENCOUNTER (OUTPATIENT)
Dept: MAMMOGRAPHY | Age: 89
End: 2025-09-04
Payer: MEDICARE

## 2025-09-04 VITALS — WEIGHT: 136 LBS | BODY MASS INDEX: 26.7 KG/M2 | HEIGHT: 60 IN

## 2025-09-04 DIAGNOSIS — N63.13 LUMP IN LOWER OUTER QUADRANT OF RIGHT BREAST: ICD-10-CM

## 2025-09-04 PROCEDURE — G0279 TOMOSYNTHESIS, MAMMO: HCPCS

## 2025-09-04 PROCEDURE — 76642 ULTRASOUND BREAST LIMITED: CPT

## (undated) DEVICE — SOLUTION IRRIG 3000ML 0.9% SOD CHL USP UROMATIC PLAS CONT

## (undated) DEVICE — SUTURE STRATAFIX SPRL SZ 1 L14IN ABSRB VLT L48CM CTX 1/2 SXPD2B405

## (undated) DEVICE — DRAPE,HIP,W/POUCHES,STERILE: Brand: MEDLINE

## (undated) DEVICE — HIP HEMIARTHROPLASTY: Brand: MEDLINE INDUSTRIES, INC.

## (undated) DEVICE — SUTURE STRATAFIX SPRL SZ 3-0 L9IN ABSRB VLT FS L26MM 3/8 SXPD2B419

## (undated) DEVICE — GLOVE SURG SZ 8 L12IN FNGR THK79MIL GRN LTX FREE

## (undated) DEVICE — STRYKER PERFORMANCE SERIES SAGITTAL BLADE: Brand: STRYKER PERFORMANCE SERIES

## (undated) DEVICE — 7 DAY SILVER-COATED ANTIMICROBIAL BARRIER DRESSING: Brand: ACTICOAT 7  4" X 5"

## (undated) DEVICE — POWDER SURG CELLERATE RX 1 GM HYDROL COLLEGEN

## (undated) DEVICE — Device

## (undated) DEVICE — HOOD: Brand: FLYTE

## (undated) DEVICE — SUTURE VCRL SZ 1 L36IN ABSRB UD CTX L48MM 1/2 CIR J977H

## (undated) DEVICE — GLOVE SURG SZ 7 L12IN FNGR THK79MIL GRN LTX FREE